# Patient Record
Sex: FEMALE | Race: BLACK OR AFRICAN AMERICAN | NOT HISPANIC OR LATINO | URBAN - METROPOLITAN AREA
[De-identification: names, ages, dates, MRNs, and addresses within clinical notes are randomized per-mention and may not be internally consistent; named-entity substitution may affect disease eponyms.]

---

## 2017-06-12 ENCOUNTER — OUTPATIENT (OUTPATIENT)
Dept: OUTPATIENT SERVICES | Facility: HOSPITAL | Age: 67
LOS: 1 days | Discharge: HOME | End: 2017-06-12

## 2017-06-12 DIAGNOSIS — J40 BRONCHITIS, NOT SPECIFIED AS ACUTE OR CHRONIC: ICD-10-CM

## 2017-06-12 DIAGNOSIS — R07.9 CHEST PAIN, UNSPECIFIED: ICD-10-CM

## 2017-06-12 DIAGNOSIS — E78.5 HYPERLIPIDEMIA, UNSPECIFIED: ICD-10-CM

## 2017-06-28 DIAGNOSIS — E78.5 HYPERLIPIDEMIA, UNSPECIFIED: ICD-10-CM

## 2017-06-28 DIAGNOSIS — E11.9 TYPE 2 DIABETES MELLITUS WITHOUT COMPLICATIONS: ICD-10-CM

## 2017-11-09 ENCOUNTER — OUTPATIENT (OUTPATIENT)
Dept: OUTPATIENT SERVICES | Facility: HOSPITAL | Age: 67
LOS: 1 days | Discharge: HOME | End: 2017-11-09

## 2017-11-09 DIAGNOSIS — E11.9 TYPE 2 DIABETES MELLITUS WITHOUT COMPLICATIONS: ICD-10-CM

## 2017-11-09 DIAGNOSIS — R07.9 CHEST PAIN, UNSPECIFIED: ICD-10-CM

## 2017-11-09 DIAGNOSIS — E78.00 PURE HYPERCHOLESTEROLEMIA, UNSPECIFIED: ICD-10-CM

## 2017-11-09 DIAGNOSIS — J40 BRONCHITIS, NOT SPECIFIED AS ACUTE OR CHRONIC: ICD-10-CM

## 2017-11-09 DIAGNOSIS — E78.5 HYPERLIPIDEMIA, UNSPECIFIED: ICD-10-CM

## 2018-01-23 ENCOUNTER — EMERGENCY (EMERGENCY)
Facility: HOSPITAL | Age: 68
LOS: 0 days | Discharge: HOME | End: 2018-01-24
Admitting: INTERNAL MEDICINE

## 2018-01-23 DIAGNOSIS — E78.5 HYPERLIPIDEMIA, UNSPECIFIED: ICD-10-CM

## 2018-01-23 DIAGNOSIS — R50.9 FEVER, UNSPECIFIED: ICD-10-CM

## 2018-01-23 DIAGNOSIS — R00.0 TACHYCARDIA, UNSPECIFIED: ICD-10-CM

## 2018-01-23 DIAGNOSIS — R07.9 CHEST PAIN, UNSPECIFIED: ICD-10-CM

## 2018-01-23 DIAGNOSIS — R06.02 SHORTNESS OF BREATH: ICD-10-CM

## 2018-01-23 DIAGNOSIS — J40 BRONCHITIS, NOT SPECIFIED AS ACUTE OR CHRONIC: ICD-10-CM

## 2018-03-13 ENCOUNTER — OUTPATIENT (OUTPATIENT)
Dept: OUTPATIENT SERVICES | Facility: HOSPITAL | Age: 68
LOS: 1 days | Discharge: HOME | End: 2018-03-13

## 2018-03-13 DIAGNOSIS — N39.0 URINARY TRACT INFECTION, SITE NOT SPECIFIED: ICD-10-CM

## 2018-03-13 DIAGNOSIS — E11.9 TYPE 2 DIABETES MELLITUS WITHOUT COMPLICATIONS: ICD-10-CM

## 2018-03-13 DIAGNOSIS — E78.00 PURE HYPERCHOLESTEROLEMIA, UNSPECIFIED: ICD-10-CM

## 2018-09-26 ENCOUNTER — OUTPATIENT (OUTPATIENT)
Dept: OUTPATIENT SERVICES | Facility: HOSPITAL | Age: 68
LOS: 1 days | Discharge: HOME | End: 2018-09-26

## 2018-09-26 DIAGNOSIS — E78.00 PURE HYPERCHOLESTEROLEMIA, UNSPECIFIED: ICD-10-CM

## 2018-09-26 DIAGNOSIS — E11.9 TYPE 2 DIABETES MELLITUS WITHOUT COMPLICATIONS: ICD-10-CM

## 2019-11-15 ENCOUNTER — OUTPATIENT (OUTPATIENT)
Dept: OUTPATIENT SERVICES | Facility: HOSPITAL | Age: 69
LOS: 1 days | Discharge: HOME | End: 2019-11-15

## 2019-11-15 DIAGNOSIS — E66.8 OTHER OBESITY: ICD-10-CM

## 2019-11-15 DIAGNOSIS — E03.9 HYPOTHYROIDISM, UNSPECIFIED: ICD-10-CM

## 2019-11-15 DIAGNOSIS — I10 ESSENTIAL (PRIMARY) HYPERTENSION: ICD-10-CM

## 2019-11-15 DIAGNOSIS — E11.65 TYPE 2 DIABETES MELLITUS WITH HYPERGLYCEMIA: ICD-10-CM

## 2019-11-15 DIAGNOSIS — E55.9 VITAMIN D DEFICIENCY, UNSPECIFIED: ICD-10-CM

## 2019-11-15 DIAGNOSIS — E78.00 PURE HYPERCHOLESTEROLEMIA, UNSPECIFIED: ICD-10-CM

## 2019-11-15 DIAGNOSIS — Z00.00 ENCOUNTER FOR GENERAL ADULT MEDICAL EXAMINATION WITHOUT ABNORMAL FINDINGS: ICD-10-CM

## 2020-01-03 ENCOUNTER — APPOINTMENT (OUTPATIENT)
Dept: OBGYN | Facility: CLINIC | Age: 70
End: 2020-01-03

## 2020-08-10 ENCOUNTER — EMERGENCY (EMERGENCY)
Facility: HOSPITAL | Age: 70
LOS: 0 days | Discharge: HOME | End: 2020-08-10
Attending: EMERGENCY MEDICINE | Admitting: EMERGENCY MEDICINE
Payer: MEDICARE

## 2020-08-10 VITALS
DIASTOLIC BLOOD PRESSURE: 74 MMHG | HEIGHT: 63 IN | SYSTOLIC BLOOD PRESSURE: 146 MMHG | HEART RATE: 84 BPM | TEMPERATURE: 101 F | RESPIRATION RATE: 19 BRPM | WEIGHT: 210.1 LBS | OXYGEN SATURATION: 99 %

## 2020-08-10 VITALS — OXYGEN SATURATION: 95 %

## 2020-08-10 DIAGNOSIS — R50.9 FEVER, UNSPECIFIED: ICD-10-CM

## 2020-08-10 DIAGNOSIS — I10 ESSENTIAL (PRIMARY) HYPERTENSION: ICD-10-CM

## 2020-08-10 DIAGNOSIS — U07.1 COVID-19: ICD-10-CM

## 2020-08-10 DIAGNOSIS — J12.89 OTHER VIRAL PNEUMONIA: ICD-10-CM

## 2020-08-10 LAB
ALBUMIN SERPL ELPH-MCNC: 3.7 G/DL — SIGNIFICANT CHANGE UP (ref 3.5–5.2)
ALP SERPL-CCNC: 52 U/L — SIGNIFICANT CHANGE UP (ref 30–115)
ALT FLD-CCNC: 27 U/L — SIGNIFICANT CHANGE UP (ref 0–41)
ANION GAP SERPL CALC-SCNC: 13 MMOL/L — SIGNIFICANT CHANGE UP (ref 7–14)
APPEARANCE UR: CLEAR — SIGNIFICANT CHANGE UP
AST SERPL-CCNC: 49 U/L — HIGH (ref 0–41)
BACTERIA # UR AUTO: ABNORMAL
BASE EXCESS BLDV CALC-SCNC: 13.2 MMOL/L — HIGH (ref -2–2)
BASOPHILS # BLD AUTO: 0.02 K/UL — SIGNIFICANT CHANGE UP (ref 0–0.2)
BASOPHILS NFR BLD AUTO: 0.4 % — SIGNIFICANT CHANGE UP (ref 0–1)
BILIRUB SERPL-MCNC: 0.4 MG/DL — SIGNIFICANT CHANGE UP (ref 0.2–1.2)
BILIRUB UR-MCNC: NEGATIVE — SIGNIFICANT CHANGE UP
BUN SERPL-MCNC: 11 MG/DL — SIGNIFICANT CHANGE UP (ref 10–20)
CA-I SERPL-SCNC: 1.07 MMOL/L — LOW (ref 1.12–1.3)
CALCIUM SERPL-MCNC: 8.9 MG/DL — SIGNIFICANT CHANGE UP (ref 8.5–10.1)
CHLORIDE SERPL-SCNC: 88 MMOL/L — LOW (ref 98–110)
CO2 SERPL-SCNC: 32 MMOL/L — SIGNIFICANT CHANGE UP (ref 17–32)
COLOR SPEC: YELLOW — SIGNIFICANT CHANGE UP
CREAT SERPL-MCNC: 0.9 MG/DL — SIGNIFICANT CHANGE UP (ref 0.7–1.5)
DIFF PNL FLD: NEGATIVE — SIGNIFICANT CHANGE UP
EOSINOPHIL # BLD AUTO: 0 K/UL — SIGNIFICANT CHANGE UP (ref 0–0.7)
EOSINOPHIL NFR BLD AUTO: 0 % — SIGNIFICANT CHANGE UP (ref 0–8)
EPI CELLS # UR: 9 /HPF — HIGH (ref 0–5)
GAS PNL BLDV: 134 MMOL/L — LOW (ref 136–145)
GAS PNL BLDV: SIGNIFICANT CHANGE UP
GLUCOSE SERPL-MCNC: 105 MG/DL — HIGH (ref 70–99)
GLUCOSE UR QL: NEGATIVE — SIGNIFICANT CHANGE UP
HCO3 BLDV-SCNC: 38 MMOL/L — HIGH (ref 22–29)
HCT VFR BLD CALC: 38.4 % — SIGNIFICANT CHANGE UP (ref 37–47)
HCT VFR BLDA CALC: 39.8 % — SIGNIFICANT CHANGE UP (ref 34–44)
HGB BLD CALC-MCNC: 13 G/DL — LOW (ref 14–18)
HGB BLD-MCNC: 12.4 G/DL — SIGNIFICANT CHANGE UP (ref 12–16)
HYALINE CASTS # UR AUTO: 1 /LPF — SIGNIFICANT CHANGE UP (ref 0–7)
IMM GRANULOCYTES NFR BLD AUTO: 0.2 % — SIGNIFICANT CHANGE UP (ref 0.1–0.3)
KETONES UR-MCNC: NEGATIVE — SIGNIFICANT CHANGE UP
LACTATE BLDV-MCNC: 1.3 MMOL/L — SIGNIFICANT CHANGE UP (ref 0.5–1.6)
LEUKOCYTE ESTERASE UR-ACNC: NEGATIVE — SIGNIFICANT CHANGE UP
LYMPHOCYTES # BLD AUTO: 1.23 K/UL — SIGNIFICANT CHANGE UP (ref 1.2–3.4)
LYMPHOCYTES # BLD AUTO: 22.6 % — SIGNIFICANT CHANGE UP (ref 20.5–51.1)
MCHC RBC-ENTMCNC: 27.6 PG — SIGNIFICANT CHANGE UP (ref 27–31)
MCHC RBC-ENTMCNC: 32.3 G/DL — SIGNIFICANT CHANGE UP (ref 32–37)
MCV RBC AUTO: 85.5 FL — SIGNIFICANT CHANGE UP (ref 81–99)
MONOCYTES # BLD AUTO: 0.39 K/UL — SIGNIFICANT CHANGE UP (ref 0.1–0.6)
MONOCYTES NFR BLD AUTO: 7.2 % — SIGNIFICANT CHANGE UP (ref 1.7–9.3)
NEUTROPHILS # BLD AUTO: 3.8 K/UL — SIGNIFICANT CHANGE UP (ref 1.4–6.5)
NEUTROPHILS NFR BLD AUTO: 69.6 % — SIGNIFICANT CHANGE UP (ref 42.2–75.2)
NITRITE UR-MCNC: NEGATIVE — SIGNIFICANT CHANGE UP
NRBC # BLD: 0 /100 WBCS — SIGNIFICANT CHANGE UP (ref 0–0)
PCO2 BLDV: 45 MMHG — SIGNIFICANT CHANGE UP (ref 41–51)
PH BLDV: 7.53 — HIGH (ref 7.26–7.43)
PH UR: 7 — SIGNIFICANT CHANGE UP (ref 5–8)
PLATELET # BLD AUTO: 145 K/UL — SIGNIFICANT CHANGE UP (ref 130–400)
PO2 BLDV: 34 MMHG — SIGNIFICANT CHANGE UP (ref 20–40)
POTASSIUM BLDV-SCNC: 2.7 MMOL/L — LOW (ref 3.3–5.6)
POTASSIUM SERPL-MCNC: 3.4 MMOL/L — LOW (ref 3.5–5)
POTASSIUM SERPL-SCNC: 3.4 MMOL/L — LOW (ref 3.5–5)
PROT SERPL-MCNC: 7.4 G/DL — SIGNIFICANT CHANGE UP (ref 6–8)
PROT UR-MCNC: ABNORMAL
RBC # BLD: 4.49 M/UL — SIGNIFICANT CHANGE UP (ref 4.2–5.4)
RBC # FLD: 14.1 % — SIGNIFICANT CHANGE UP (ref 11.5–14.5)
RBC CASTS # UR COMP ASSIST: 6 /HPF — HIGH (ref 0–4)
SAO2 % BLDV: 67 % — SIGNIFICANT CHANGE UP
SODIUM SERPL-SCNC: 133 MMOL/L — LOW (ref 135–146)
SP GR SPEC: 1.02 — SIGNIFICANT CHANGE UP (ref 1.01–1.02)
UROBILINOGEN FLD QL: SIGNIFICANT CHANGE UP
WBC # BLD: 5.45 K/UL — SIGNIFICANT CHANGE UP (ref 4.8–10.8)
WBC # FLD AUTO: 5.45 K/UL — SIGNIFICANT CHANGE UP (ref 4.8–10.8)
WBC UR QL: 1 /HPF — SIGNIFICANT CHANGE UP (ref 0–5)

## 2020-08-10 PROCEDURE — 93010 ELECTROCARDIOGRAM REPORT: CPT

## 2020-08-10 PROCEDURE — 99284 EMERGENCY DEPT VISIT MOD MDM: CPT | Mod: CS,GC

## 2020-08-10 PROCEDURE — 71045 X-RAY EXAM CHEST 1 VIEW: CPT | Mod: 26

## 2020-08-10 RX ORDER — ACETAMINOPHEN 500 MG
650 TABLET ORAL ONCE
Refills: 0 | Status: COMPLETED | OUTPATIENT
Start: 2020-08-10 | End: 2020-08-10

## 2020-08-10 RX ORDER — SODIUM CHLORIDE 9 MG/ML
1000 INJECTION INTRAMUSCULAR; INTRAVENOUS; SUBCUTANEOUS ONCE
Refills: 0 | Status: COMPLETED | OUTPATIENT
Start: 2020-08-10 | End: 2020-08-10

## 2020-08-10 RX ORDER — IBUPROFEN 200 MG
600 TABLET ORAL ONCE
Refills: 0 | Status: COMPLETED | OUTPATIENT
Start: 2020-08-10 | End: 2020-08-10

## 2020-08-10 RX ORDER — AZITHROMYCIN 500 MG/1
1 TABLET, FILM COATED ORAL
Qty: 4 | Refills: 0
Start: 2020-08-10 | End: 2020-08-13

## 2020-08-10 RX ORDER — ONDANSETRON 8 MG/1
4 TABLET, FILM COATED ORAL ONCE
Refills: 0 | Status: COMPLETED | OUTPATIENT
Start: 2020-08-10 | End: 2020-08-10

## 2020-08-10 RX ORDER — POTASSIUM CHLORIDE 20 MEQ
40 PACKET (EA) ORAL ONCE
Refills: 0 | Status: COMPLETED | OUTPATIENT
Start: 2020-08-10 | End: 2020-08-10

## 2020-08-10 RX ORDER — AZITHROMYCIN 500 MG/1
500 TABLET, FILM COATED ORAL ONCE
Refills: 0 | Status: COMPLETED | OUTPATIENT
Start: 2020-08-10 | End: 2020-08-10

## 2020-08-10 RX ADMIN — Medication 600 MILLIGRAM(S): at 18:24

## 2020-08-10 RX ADMIN — SODIUM CHLORIDE 1000 MILLILITER(S): 9 INJECTION INTRAMUSCULAR; INTRAVENOUS; SUBCUTANEOUS at 13:30

## 2020-08-10 RX ADMIN — ONDANSETRON 4 MILLIGRAM(S): 8 TABLET, FILM COATED ORAL at 15:32

## 2020-08-10 RX ADMIN — Medication 40 MILLIEQUIVALENT(S): at 17:23

## 2020-08-10 RX ADMIN — Medication 650 MILLIGRAM(S): at 13:27

## 2020-08-10 RX ADMIN — AZITHROMYCIN 255 MILLIGRAM(S): 500 TABLET, FILM COATED ORAL at 20:47

## 2020-08-10 RX ADMIN — Medication 650 MILLIGRAM(S): at 18:23

## 2020-08-10 NOTE — ED PROVIDER NOTE - OBJECTIVE STATEMENT
The pt is a 69 year old female with a history of HTN presenting for fever x ~1 week. Associated with cough, body aches, weakness. States she has been taking tylenol (last took ~8am) with mild improvement of symptoms. no nausea, vomiting, chest pain, shortness of breath, abdominal pain.

## 2020-08-10 NOTE — ED PROVIDER NOTE - CARE PROVIDER_API CALL
Max Swanson  INTERNAL MEDICINE  [address] [place] [address]  Phone: [phone]  Fax: [phone]  Established Patient  Follow Up Time: 1-3 Days

## 2020-08-10 NOTE — ED ADULT TRIAGE NOTE - CHIEF COMPLAINT QUOTE
C.o cough and fever for the past few days. Pending covid results from 8/1/20. Last reported fever 101.5 @ 8a took 650mg tylenol po @ home.

## 2020-08-10 NOTE — ED PROVIDER NOTE - CARE PROVIDERS DIRECT ADDRESSES
[email or web address]

## 2020-08-10 NOTE — ED PROVIDER NOTE - NSFOLLOWUPINSTRUCTIONS_ED_ALL_ED_FT
Community-Acquired Pneumonia, Adult  Pneumonia is an infection of the lungs. There are different types of pneumonia. One type can develop while a person is in a hospital. A different type, called community-acquired pneumonia, develops in people who are not, or have not recently been, in the hospital or other health care facility.    What are the causes?  ImagePneumonia may be caused by bacteria, viruses, or funguses. Community-acquired pneumonia is often caused by Streptococcus pneumonia bacteria. These bacteria are often passed from one person to another by breathing in droplets from the cough or sneeze of an infected person.    What increases the risk?  The condition is more likely to develop in:    People who have chronic diseases, such as chronic obstructive pulmonary disease (COPD), asthma, congestive heart failure, cystic fibrosis, diabetes, or kidney disease.  People who have early-stage or late-stage HIV.  People who have sickle cell disease.  People who have had their spleen removed (splenectomy).  People who have poor dental hygiene.  People who have medical conditions that increase the risk of breathing in (aspirating) secretions their own mouth and nose.  People who have a weakened immune system (immunocompromised).  People who smoke.  People who travel to areas where pneumonia-causing germs commonly exist.  People who are around animal habitats or animals that have pneumonia-causing germs, including birds, bats, rabbits, cats, and farm animals.    What are the signs or symptoms?  Symptoms of this condition include:    A dry cough.  A wet (productive) cough.  Fever.  Sweating.  Chest pain, especially when breathing deeply or coughing.  Rapid breathing or difficulty breathing.  Shortness of breath.  Shaking chills.  Fatigue.  Muscle aches.    How is this diagnosed?  Your health care provider will take a medical history and perform a physical exam. You may also have other tests, including:    Imaging studies of your chest, including X-rays.  Tests to check your blood oxygen level and other blood gases.  Other tests on blood, mucus (sputum), fluid around your lungs (pleural fluid), and urine.    If your pneumonia is severe, other tests may be done to identify the specific cause of your illness.    How is this treated?  The type of treatment that you receive depends on many factors, such as the cause of your pneumonia, the medicines you take, and other medical conditions that you have. For most adults, treatment and recovery from pneumonia may occur at home. In some cases, treatment must happen in a hospital. Treatment may include:    Antibiotic medicines, if the pneumonia was caused by bacteria.  Antiviral medicines, if the pneumonia was caused by a virus.  Medicines that are given by mouth or through an IV tube.  Oxygen.  Respiratory therapy.    Although rare, treating severe pneumonia may include:    Mechanical ventilation. This is done if you are not breathing well on your own and you cannot maintain a safe blood oxygen level.  Thoracentesis. This procedure removes fluid around one lung or both lungs to help you breathe better.    Follow these instructions at home:  Take over-the-counter and prescription medicines only as told by your health care provider.    Only take cough medicine if you are losing sleep. Understand that cough medicine can prevent your body’s natural ability to remove mucus from your lungs.  If you were prescribed an antibiotic medicine, take it as told by your health care provider. Do not stop taking the antibiotic even if you start to feel better.    Sleep in a semi-upright position at night. Try sleeping in a reclining chair, or place a few pillows under your head.  Do not use tobacco products, including cigarettes, chewing tobacco, and e-cigarettes. If you need help quitting, ask your health care provider.  Drink enough water to keep your urine clear or pale yellow. This will help to thin out mucus secretions in your lungs.  How is this prevented?  There are ways that you can decrease your risk of developing community-acquired pneumonia. Consider getting a pneumococcal vaccine if:    You are older than 65 years of age.  You are older than 19 years of age and are undergoing cancer treatment, have chronic lung disease, or have other medical conditions that affect your immune system. Ask your health care provider if this applies to you.    There are different types and schedules of pneumococcal vaccines. Ask your health care provider which vaccination option is best for you.    You may also prevent community-acquired pneumonia if you take these actions:    Get an influenza vaccine every year. Ask your health care provider which type of influenza vaccine is best for you.  Go to the dentist on a regular basis.  Wash your hands often. Use hand  if soap and water are not available.    Contact a health care provider if:  You have a fever.  You are losing sleep because you cannot control your cough with cough medicine.  Get help right away if:  You have worsening shortness of breath.  You have increased chest pain.  Your sickness becomes worse, especially if you are an older adult or have a weakened immune system.  You cough up blood.  This information is not intended to replace advice given to you by your health care provider. Make sure you discuss any questions you have with your health care provider.    You are being discharged with viral illness diagnosis and do not require hospitalization.  At this time, only patients who are being hospitalized are tested for COVID-19.    If you are well enough to be discharged home and are not in a high risk group to be admitted, you should care for yourself at home exactly like you would if you have Influenza “flu”. Follow all the standard guidelines about washing your hands, covering your cough, etc. If you feel unwell, stay home, rest and drink plenty of clear fluids. Keep track of your symptoms.   You do need to remain home for at least 7 days from the onset of symptoms or 3 days after your fever is completely gone and your respiratory symptoms are better, whichever is longer. You should continue to isolate yourself.    If symptoms worsen or continue and you need to seek medical care, call your healthcare provider in advance, or 9-1-1 in an emergency, and let them know you are a close contact to a person with confirmed COVID-19  You should return to the Emergency Department if you develop worse symptoms, trouble breathing, chest pain, and/or a fever that doesn’t improve with over the counter medications.    Please consider going through the drive-through testing unless you are severely ill and need to go to the ED.  -through testing is available at various location, including [place].  Call [facility] at  [phone] to make an appointment.    How to Set Up Your Home for Self-Quarantine or Self-Isolation    Please refer to this helpful video.   [email or web address]

## 2020-08-10 NOTE — ED PROVIDER NOTE - CLINICAL SUMMARY MEDICAL DECISION MAKING FREE TEXT BOX
70 yo F presented to ED for fever and body aches. Pt cxr concerning for COVID. Covid sent. COVID test pending. Will tx for pneumonia until results. Discussed with pt importance of quarantining. In addition discussed strict return precautions if her SOB worsens.

## 2020-08-10 NOTE — ED PROVIDER NOTE - NS ED ROS FT
Eyes:  No visual changes, eye pain or discharge.  ENMT:  No hearing changes, pain, discharge or infections. No neck pain or stiffness.  Cardiac:  No chest pain, SOB or edema. No chest pain with exertion.  Respiratory:  + cough or respiratory distress. No hemoptysis. No history of asthma or RAD.  GI:  No nausea, vomiting, diarrhea or abdominal pain.  :  No dysuria, frequency or burning.  MS:  +body aches, no back pain.  Neuro:  No headache.  No LOC.  Skin:  No skin rash.   Endocrine: No history of thyroid disease or diabetes.

## 2020-08-10 NOTE — ED PROVIDER NOTE - ATTENDING CONTRIBUTION TO CARE
70 yo F with PMH of HTN presents to ED for 1 week of fever. Associated with weakness, body aches and mild cough. She has been taking tylenol for it and last took it at 8am this morning. No nausea, vomiting, SOB, palpitations.     Const: Well nourished, well developed, appears stated age  Eyes: PERRL, no conjunctival injection  HENT:  Neck supple without meningismus   CV: RRR, Warm, well-perfused extremities  RESP: CTA B/L, no tachypnea   GI: soft, non-tender, non-distended  MSK: No gross deformities appreciated  Skin: Warm, dry. No rashes  Neuro: Alert, CNs II-XII grossly intact. Sensation and motor function of extremities grossly intact.  Psych: Appropriate mood and affect.    Will do labs, CXR, UA

## 2020-08-11 LAB — SARS-COV-2 RNA SPEC QL NAA+PROBE: DETECTED

## 2020-08-12 NOTE — ED POST DISCHARGE NOTE - RESULT SUMMARY
Spoke with patient regarding (+) COVID-19 result. She stated that she was already aware via e-mail communication. Quarantine precautions advised and strict return precautions discussed.

## 2020-08-15 ENCOUNTER — INPATIENT (INPATIENT)
Facility: HOSPITAL | Age: 70
LOS: 3 days | Discharge: HOME | End: 2020-08-19
Attending: INTERNAL MEDICINE | Admitting: INTERNAL MEDICINE
Payer: MEDICARE

## 2020-08-15 VITALS
HEART RATE: 83 BPM | TEMPERATURE: 100 F | HEIGHT: 63 IN | RESPIRATION RATE: 24 BRPM | OXYGEN SATURATION: 92 % | WEIGHT: 210.1 LBS | SYSTOLIC BLOOD PRESSURE: 144 MMHG | DIASTOLIC BLOOD PRESSURE: 65 MMHG

## 2020-08-15 DIAGNOSIS — R09.89 OTHER SPECIFIED SYMPTOMS AND SIGNS INVOLVING THE CIRCULATORY AND RESPIRATORY SYSTEMS: ICD-10-CM

## 2020-08-15 DIAGNOSIS — Z96.652 PRESENCE OF LEFT ARTIFICIAL KNEE JOINT: Chronic | ICD-10-CM

## 2020-08-15 DIAGNOSIS — Z90.89 ACQUIRED ABSENCE OF OTHER ORGANS: Chronic | ICD-10-CM

## 2020-08-15 LAB
ALBUMIN SERPL ELPH-MCNC: 3.5 G/DL — SIGNIFICANT CHANGE UP (ref 3.5–5.2)
ALP SERPL-CCNC: 47 U/L — SIGNIFICANT CHANGE UP (ref 30–115)
ALT FLD-CCNC: 23 U/L — SIGNIFICANT CHANGE UP (ref 0–41)
ANION GAP SERPL CALC-SCNC: 12 MMOL/L — SIGNIFICANT CHANGE UP (ref 7–14)
APTT BLD: 26.7 SEC — LOW (ref 27–39.2)
AST SERPL-CCNC: 38 U/L — SIGNIFICANT CHANGE UP (ref 0–41)
BASOPHILS # BLD AUTO: 0.03 K/UL — SIGNIFICANT CHANGE UP (ref 0–0.2)
BASOPHILS NFR BLD AUTO: 0.4 % — SIGNIFICANT CHANGE UP (ref 0–1)
BILIRUB SERPL-MCNC: 0.5 MG/DL — SIGNIFICANT CHANGE UP (ref 0.2–1.2)
BUN SERPL-MCNC: 8 MG/DL — LOW (ref 10–20)
CALCIUM SERPL-MCNC: 9.4 MG/DL — SIGNIFICANT CHANGE UP (ref 8.5–10.1)
CHLORIDE SERPL-SCNC: 95 MMOL/L — LOW (ref 98–110)
CO2 SERPL-SCNC: 31 MMOL/L — SIGNIFICANT CHANGE UP (ref 17–32)
CREAT SERPL-MCNC: 0.7 MG/DL — SIGNIFICANT CHANGE UP (ref 0.7–1.5)
CRP SERPL-MCNC: 8.78 MG/DL — HIGH (ref 0–0.4)
D DIMER BLD IA.RAPID-MCNC: 3033 NG/ML DDU — HIGH (ref 0–230)
EOSINOPHIL # BLD AUTO: 0.11 K/UL — SIGNIFICANT CHANGE UP (ref 0–0.7)
EOSINOPHIL NFR BLD AUTO: 1.5 % — SIGNIFICANT CHANGE UP (ref 0–8)
FERRITIN SERPL-MCNC: 289 NG/ML — HIGH (ref 15–150)
GLUCOSE SERPL-MCNC: 123 MG/DL — HIGH (ref 70–99)
HCT VFR BLD CALC: 36.4 % — LOW (ref 37–47)
HGB BLD-MCNC: 11.7 G/DL — LOW (ref 12–16)
IMM GRANULOCYTES NFR BLD AUTO: 0.7 % — HIGH (ref 0.1–0.3)
INR BLD: 1.01 RATIO — SIGNIFICANT CHANGE UP (ref 0.65–1.3)
LDH SERPL L TO P-CCNC: 375 — HIGH (ref 50–242)
LYMPHOCYTES # BLD AUTO: 1.58 K/UL — SIGNIFICANT CHANGE UP (ref 1.2–3.4)
LYMPHOCYTES # BLD AUTO: 21.4 % — SIGNIFICANT CHANGE UP (ref 20.5–51.1)
MCHC RBC-ENTMCNC: 27.7 PG — SIGNIFICANT CHANGE UP (ref 27–31)
MCHC RBC-ENTMCNC: 32.1 G/DL — SIGNIFICANT CHANGE UP (ref 32–37)
MCV RBC AUTO: 86.1 FL — SIGNIFICANT CHANGE UP (ref 81–99)
MONOCYTES # BLD AUTO: 0.66 K/UL — HIGH (ref 0.1–0.6)
MONOCYTES NFR BLD AUTO: 8.9 % — SIGNIFICANT CHANGE UP (ref 1.7–9.3)
NEUTROPHILS # BLD AUTO: 4.95 K/UL — SIGNIFICANT CHANGE UP (ref 1.4–6.5)
NEUTROPHILS NFR BLD AUTO: 67.1 % — SIGNIFICANT CHANGE UP (ref 42.2–75.2)
NRBC # BLD: 0 /100 WBCS — SIGNIFICANT CHANGE UP (ref 0–0)
PLATELET # BLD AUTO: 319 K/UL — SIGNIFICANT CHANGE UP (ref 130–400)
POTASSIUM SERPL-MCNC: 3.6 MMOL/L — SIGNIFICANT CHANGE UP (ref 3.5–5)
POTASSIUM SERPL-SCNC: 3.6 MMOL/L — SIGNIFICANT CHANGE UP (ref 3.5–5)
PROCALCITONIN SERPL-MCNC: 0.05 NG/ML — SIGNIFICANT CHANGE UP (ref 0.02–0.1)
PROCALCITONIN SERPL-MCNC: 0.06 NG/ML — SIGNIFICANT CHANGE UP (ref 0.02–0.1)
PROT SERPL-MCNC: 7.3 G/DL — SIGNIFICANT CHANGE UP (ref 6–8)
PROTHROM AB SERPL-ACNC: 11.6 SEC — SIGNIFICANT CHANGE UP (ref 9.95–12.87)
RBC # BLD: 4.23 M/UL — SIGNIFICANT CHANGE UP (ref 4.2–5.4)
RBC # FLD: 14.3 % — SIGNIFICANT CHANGE UP (ref 11.5–14.5)
SODIUM SERPL-SCNC: 138 MMOL/L — SIGNIFICANT CHANGE UP (ref 135–146)
TRIGL SERPL-MCNC: 128 MG/DL — SIGNIFICANT CHANGE UP (ref 10–149)
TROPONIN T SERPL-MCNC: <0.01 NG/ML — SIGNIFICANT CHANGE UP
WBC # BLD: 7.38 K/UL — SIGNIFICANT CHANGE UP (ref 4.8–10.8)
WBC # FLD AUTO: 7.38 K/UL — SIGNIFICANT CHANGE UP (ref 4.8–10.8)

## 2020-08-15 PROCEDURE — 71045 X-RAY EXAM CHEST 1 VIEW: CPT | Mod: 26

## 2020-08-15 PROCEDURE — 99285 EMERGENCY DEPT VISIT HI MDM: CPT | Mod: CS,GC

## 2020-08-15 PROCEDURE — 71275 CT ANGIOGRAPHY CHEST: CPT | Mod: 26,CS

## 2020-08-15 PROCEDURE — 93970 EXTREMITY STUDY: CPT | Mod: 26,CS

## 2020-08-15 PROCEDURE — 93010 ELECTROCARDIOGRAM REPORT: CPT

## 2020-08-15 RX ORDER — REMDESIVIR 5 MG/ML
100 INJECTION INTRAVENOUS EVERY 24 HOURS
Refills: 0 | Status: DISCONTINUED | OUTPATIENT
Start: 2020-08-16 | End: 2020-08-19

## 2020-08-15 RX ORDER — REMDESIVIR 5 MG/ML
INJECTION INTRAVENOUS
Refills: 0 | Status: DISCONTINUED | OUTPATIENT
Start: 2020-08-15 | End: 2020-08-19

## 2020-08-15 RX ORDER — ASPIRIN/CALCIUM CARB/MAGNESIUM 324 MG
81 TABLET ORAL DAILY
Refills: 0 | Status: DISCONTINUED | OUTPATIENT
Start: 2020-08-15 | End: 2020-08-19

## 2020-08-15 RX ORDER — HYDROCHLOROTHIAZIDE 25 MG
25 TABLET ORAL DAILY
Refills: 0 | Status: DISCONTINUED | OUTPATIENT
Start: 2020-08-15 | End: 2020-08-19

## 2020-08-15 RX ORDER — AMLODIPINE BESYLATE 2.5 MG/1
5 TABLET ORAL DAILY
Refills: 0 | Status: DISCONTINUED | OUTPATIENT
Start: 2020-08-15 | End: 2020-08-19

## 2020-08-15 RX ORDER — ATORVASTATIN CALCIUM 80 MG/1
80 TABLET, FILM COATED ORAL AT BEDTIME
Refills: 0 | Status: DISCONTINUED | OUTPATIENT
Start: 2020-08-15 | End: 2020-08-19

## 2020-08-15 RX ORDER — OMEGA-3 ACID ETHYL ESTERS 1 G
2 CAPSULE ORAL DAILY
Refills: 0 | Status: DISCONTINUED | OUTPATIENT
Start: 2020-08-15 | End: 2020-08-19

## 2020-08-15 RX ORDER — REMDESIVIR 5 MG/ML
200 INJECTION INTRAVENOUS EVERY 24 HOURS
Refills: 0 | Status: COMPLETED | OUTPATIENT
Start: 2020-08-15 | End: 2020-08-15

## 2020-08-15 RX ORDER — PANTOPRAZOLE SODIUM 20 MG/1
40 TABLET, DELAYED RELEASE ORAL
Refills: 0 | Status: DISCONTINUED | OUTPATIENT
Start: 2020-08-15 | End: 2020-08-19

## 2020-08-15 RX ORDER — ENOXAPARIN SODIUM 100 MG/ML
90 INJECTION SUBCUTANEOUS EVERY 12 HOURS
Refills: 0 | Status: DISCONTINUED | OUTPATIENT
Start: 2020-08-15 | End: 2020-08-17

## 2020-08-15 RX ORDER — REMDESIVIR 5 MG/ML
INJECTION INTRAVENOUS
Refills: 0 | Status: DISCONTINUED | OUTPATIENT
Start: 2020-08-15 | End: 2020-08-15

## 2020-08-15 RX ORDER — CHLORHEXIDINE GLUCONATE 213 G/1000ML
1 SOLUTION TOPICAL
Refills: 0 | Status: DISCONTINUED | OUTPATIENT
Start: 2020-08-15 | End: 2020-08-19

## 2020-08-15 RX ORDER — DEXAMETHASONE 0.5 MG/5ML
10 ELIXIR ORAL ONCE
Refills: 0 | Status: COMPLETED | OUTPATIENT
Start: 2020-08-15 | End: 2020-08-15

## 2020-08-15 RX ADMIN — Medication 102 MILLIGRAM(S): at 04:00

## 2020-08-15 RX ADMIN — ATORVASTATIN CALCIUM 80 MILLIGRAM(S): 80 TABLET, FILM COATED ORAL at 21:38

## 2020-08-15 RX ADMIN — ENOXAPARIN SODIUM 90 MILLIGRAM(S): 100 INJECTION SUBCUTANEOUS at 17:17

## 2020-08-15 RX ADMIN — Medication 2 GRAM(S): at 11:36

## 2020-08-15 RX ADMIN — REMDESIVIR 500 MILLIGRAM(S): 5 INJECTION INTRAVENOUS at 18:20

## 2020-08-15 RX ADMIN — Medication 60 MILLIGRAM(S): at 18:12

## 2020-08-15 RX ADMIN — Medication 81 MILLIGRAM(S): at 11:36

## 2020-08-15 NOTE — CONSULT NOTE ADULT - SUBJECTIVE AND OBJECTIVE BOX
GRAHAM STUBBS  69y, Female  Allergy: No Known Allergies      CHIEF COMPLAINT: COVID-19 pneumonia (15 Aug 2020 10:11)      LOS      HPI:  [69y woman]    CC: worsening shortness of breath    PMH: hypertension, dyslipidemia, osteoarthritis s/p L knee replacement, and recent COVID-19 infection    PSH: L total knee replacement 2020, tonsillectomy    History of present illness goes back to five days ago when the patient presented to the [facility] for fever of 1 weeks duration associated with body aches, cough, and weakness. Patient was diagnosed with CAP and suspicion for COVID-19, and was discharged on azithromycin. COVID-19 test came back positive after the patient was discharged, but she felt fine at the time. At approximately 8pm on 2020, the patient developed a cough that progressively worsened over the course of a couple hours. During that time, her breathing became shallow and more labored. At approximately 1am, the patient had her son drive her to the ED. Patient also claims that she gets pneumonia or bronchitis every year.    In the ED, vital signs were Tmax 99.5F, HR 83, /65, RR 24, SpO2 95% on 3L O2 via nasal cannula. Patient received dexamethasone 10mg IV. Chest X-ray showed worsening bilateral opacities. D-dimer was 3033 on admission. CT angio chest was negative for pulmonary embolism, but showed significant bilateral patchy ground-glass opacities. (15 Aug 2020 10:11)      INFECTIOUS DISEASE HISTORY:  She is currently on 2L NC.     PAST MEDICAL & SURGICAL HISTORY:  Osteoarthritis  Dyslipidemia  Hypertension  History of tonsillectomy  History of left knee replacement: 2020      FAMILY HISTORY      SOCIAL HISTORY  Social History:  Marital Status: single  Living Situation: lives at home with daughter  Occupation: retired, former work for mental health services (38yrs)  Tobacco Use: former smoker, quit ~20yrs ago, smoked 1ppd for ~30yrs  Alcohol Use: socially  Drug Use: denies  Sexual History: declined to answer  Functional Status: ambulates at home with cane (15 Aug 2020 10:11)        ROS  General: Denies rigors, nightsweats  HEENT: Denies headache, rhinorrhea, sore throat, eye pain  CV: Denies CP, palpitations  PULM: Denies wheezing, hemoptysis  GI: Denies hematemesis, hematochezia, melena  : Denies discharge, hematuria  MSK: Denies arthralgias, myalgias  SKIN: Denies rash, lesions  NEURO: Denies paresthesias, weakness  PSYCH: Denies depression, anxiety    VITALS:  T(F): 98.5, Max: 99.5 (08-15-20 @ 02:15)  HR: 71  BP: 126/75  RR: 18Vital Signs Last 24 Hrs  T(C): 36.9 (15 Aug 2020 11:15), Max: 37.5 (15 Aug 2020 02:15)  T(F): 98.5 (15 Aug 2020 11:15), Max: 99.5 (15 Aug 2020 02:15)  HR: 71 (15 Aug 2020 11:15) (71 - 83)  BP: 126/75 (15 Aug 2020 11:15) (126/75 - 144/65)  BP(mean): --  RR: 18 (15 Aug 2020 11:15) (18 - 24)  SpO2: 98% (15 Aug 2020 11:15) (92% - 98%)    PHYSICAL EXAM:  Gen: NAD, resting in bed  HEENT: Normocephalic, atraumatic  Neck: supple, no lymphadenopathy  CV: Regular rate & regular rhythm  Lungs: decreased BS at bases, no fremitus  Abdomen: Soft, BS present  Ext: Warm, well perfused  Neuro: non focal, awake  Skin: no rash, no erythema  Lines: no phlebitis    TESTS & MEASUREMENTS:                        11.7   7.38  )-----------( 319      ( 15 Aug 2020 03:36 )             36.4     08-15    138  |  95<L>  |  8<L>  ----------------------------<  123<H>  3.6   |  31  |  0.7    Ca    9.4      15 Aug 2020 03:36    TPro  7.3  /  Alb  3.5  /  TBili  0.5  /  DBili  x   /  AST  38  /  ALT  23  /  AlkPhos  47  15    eGFR if Non African American: 89 mL/min/1.73M2 (08-15-20 @ 03:36)  eGFR if : 103 mL/min/1.73M2 (08-15-20 @ 03:36)    LIVER FUNCTIONS - ( 15 Aug 2020 03:36 )  Alb: 3.5 g/dL / Pro: 7.3 g/dL / ALK PHOS: 47 U/L / ALT: 23 U/L / AST: 38 U/L / GGT: x                 Blood Gas Venous - Lactate: 1.3 mmoL/L (08-10-20 @ 15:56)      INFECTIOUS DISEASES TESTING  Procalcitonin, Serum: 0.06 ng/mL (08-15-20 @ 03:37)  Procalcitonin, Serum: 0.05 ng/mL (08-15-20 @ 03:36)  COVID-19 PCR: Detected (08-10-20 @ 19:58)      RADIOLOGY & ADDITIONAL TESTS:  I have personally reviewed the last Chest xray  CXR  Xray Chest 1 View- PORTABLE-Urgent:   EXAM:  XR CHEST PORTABLE URGENT 1V            PROCEDURE DATE:  08/15/2020            INTERPRETATION:  Clinical History / Reason for exam: Shortness of breath    Comparison : Chest radiograph August 10, 2020.    Technique/Positionin frontal views.    Findings:    Support devices: None.    Cardiac/mediastinum/hilum: Heart size within normal limits, thoracic aortic calcification.    Lung parenchyma/Pleura: Bilateral opacities.    Skeleton/soft tissues: Stable.    Impression:    Bilateral opacities, worsened.                ERLINDA FUENTES M.D., ATTENDING RADIOLOGIST  This document has been electronically signed. Aug 15 2020  7:12AM             (08-15-20 @ 03:08)      CT  CT Angio Chest w/ IV Cont:   EXAM:  CT ANGIO CHEST (W)AW IC            PROCEDURE DATE:  08/15/2020            INTERPRETATION:  CLINICAL HISTORY/REASON FOR EXAM: Chest pain. Recent diagnosis of COVID-19.    TECHNIQUE: Multislice helical sections were obtained from the thoracic inlet to the lung bases during rapid administration of intravenous contrast. Thin sections were reconstructed through the pulmonary vasculature. 3D (MIP) reformats obtained.    COMPARISON: None.    FINDINGS:    PULMONARY EMBOLUS: No evidence of acute pulmonary embolism.    LUNGS, PLEURA, AIRWAYS: Multifocal bilateral patchy groundglass and consolidative airspace opacities. No lobar mass, effusion, or pneumothorax. No evidence of central endobronchial obstruction. No bronchiectasis or honeycombing.    THORACIC NODES: Nonspecific prominent 1.6 cm short axis subcarinal lymph node, likely reactive.    MEDIASTINUM/GREAT VESSELS: No pericardial effusion. Heart size is within normal limits. The aorta and main pulmonary artery are of normal caliber. Small hiatal hernia.    BONES/SOFT TISSUES: Degenerative change, thoracic spine.    VISUALIZED UPPER ABDOMEN: Right renal cyst. Pancreatic body 0.9 cm lipoma.      IMPRESSION:    1. Multifocal bilateral patchy groundglass and consolidative airspace opacities, consistent with viral pneumonia in the appropriate clinical setting.    2. No evidence of acute pulmonary embolism.                    BRISEYDA ALEXIS M.D., ATTENDING RADIOLOGIST  This document has been electronically signed. Aug 15 2020  7:38AM            (08-15-20 @ 07:37)      CARDIOLOGY TESTING  12 Lead ECG:   Ventricular Rate 81 BPM    Atrial Rate 81 BPM    P-R Interval 172 ms    QRS Duration 80 ms    Q-T Interval 394 ms    QTC Calculation(Bezet) 457 ms    P Axis 27 degrees    R Axis -12 degrees    T Axis 3 degrees    Diagnosis Line Normal sinus rhythm with sinus arrhythmia  Low voltage QRS  Cannot rule out Anterior infarct , age undetermined  Abnormal ECG    Confirmed by MANUEL BLAS MD ([id number]) on 8/15/2020 9:01:27 AM (08-15-20 @ 02:53)  12 Lead ECG:   Ventricular Rate 82 BPM    Atrial Rate 82 BPM    P-R Interval 176 ms    QRS Duration 82 ms    Q-T Interval 438 ms    QTC Calculation(Bezet) 511 ms    P Axis 29 degrees    R Axis -2 degrees    T Axis 77 degrees    Diagnosis Line Sinus rhythm with Fusion complexes  Low voltage QRS  Nonspecific T wave abnormality  Abnormal ECG    Confirmed by ARIA DAMICO MD [id number]) on 8/10/2020 3:18:12 PM (08-10-20 @ 13:48)      MEDICATIONS  amLODIPine   Tablet 5 Oral daily  aspirin enteric coated 81 Oral daily  atorvastatin 80 Oral at bedtime  chlorhexidine 4% Liquid 1 Topical <User Schedule>  enoxaparin Injectable 90 SubCutaneous every 12 hours  hydrochlorothiazide 25 Oral daily  methylPREDNISolone sodium succinate Injectable 60 IV Push every 12 hours  omega-3-Acid Ethyl Esters 2 Oral daily  pantoprazole    Tablet 40 Oral before breakfast      Weight  Weight (kg): 95.3 (08-15-20 @ 02:15)    ANTIBIOTICS:      ALLERGIES:  No Known Allergies GRAHAM STUBBS  69y, Female  Allergy: No Known Allergies      CHIEF COMPLAINT: COVID-19 pneumonia (15 Aug 2020 10:11)      LOS      HPI:  [69y woman]    CC: worsening shortness of breath    PMH: hypertension, dyslipidemia, osteoarthritis s/p L knee replacement, and recent COVID-19 infection    PSH: L total knee replacement 2020, tonsillectomy    History of present illness goes back to five days ago when the patient presented to the [facility] for fever of 1 weeks duration associated with body aches, cough, and weakness. Patient was diagnosed with CAP and suspicion for COVID-19, and was discharged on azithromycin. COVID-19 test came back positive after the patient was discharged, but she felt fine at the time. At approximately 8pm on 2020, the patient developed a cough that progressively worsened over the course of a couple hours. During that time, her breathing became shallow and more labored. At approximately 1am, the patient had her son drive her to the ED. Patient also claims that she gets pneumonia or bronchitis every year.    In the ED, vital signs were Tmax 99.5F, HR 83, /65, RR 24, SpO2 95% on 3L O2 via nasal cannula. Patient received dexamethasone 10mg IV. Chest X-ray showed worsening bilateral opacities. D-dimer was 3033 on admission. CT angio chest was negative for pulmonary embolism, but showed significant bilateral patchy ground-glass opacities. (15 Aug 2020 10:11)      INFECTIOUS DISEASE HISTORY:  She is currently on 2L NC. She reports some improvement in her breathing after it worsened last night. Still with dry cough. Denies any abdominal pain, nausea, vomiting, or diarrhea. Denies any chest pain. No headaches. Denies dysuria. No recent travel. She has been started on solumdrol. Lives at home with daughter. No sick contacts.     PAST MEDICAL & SURGICAL HISTORY:  Osteoarthritis  Dyslipidemia  Hypertension  History of tonsillectomy  History of left knee replacement: 2020      FAMILY HISTORY      SOCIAL HISTORY  Social History:  Marital Status: single  Living Situation: lives at home with daughter  Occupation: retired, former work for mental health services (38yrs)  Tobacco Use: former smoker, quit ~20yrs ago, smoked 1ppd for ~30yrs  Alcohol Use: socially  Drug Use: denies  Sexual History: declined to answer  Functional Status: ambulates at home with cane (15 Aug 2020 10:11)        ROS  General: Denies rigors, nightsweats  HEENT: Denies headache, rhinorrhea, sore throat, eye pain  CV: Denies CP, palpitations  PULM: Denies wheezing, hemoptysis  GI: Denies hematemesis, hematochezia, melena  : Denies discharge, hematuria  MSK: Denies arthralgias, myalgias  SKIN: Denies rash, lesions  NEURO: Denies paresthesias, weakness  PSYCH: Denies depression, anxiety    VITALS:  T(F): 98.5, Max: 99.5 (08-15-20 @ 02:15)  HR: 71  BP: 126/75  RR: 18Vital Signs Last 24 Hrs  T(C): 36.9 (15 Aug 2020 11:15), Max: 37.5 (15 Aug 2020 02:15)  T(F): 98.5 (15 Aug 2020 11:15), Max: 99.5 (15 Aug 2020 02:15)  HR: 71 (15 Aug 2020 11:15) (71 - 83)  BP: 126/75 (15 Aug 2020 11:15) (126/75 - 144/65)  BP(mean): --  RR: 18 (15 Aug 2020 11:15) (18 - 24)  SpO2: 98% (15 Aug 2020 11:15) (92% - 98%)    PHYSICAL EXAM:  Gen: NAD, resting in bed  HEENT: Normocephalic, atraumatic  Neck: supple, no lymphadenopathy  CV: Regular rate & regular rhythm  Lungs: decreased BS at bases, no fremitus  Abdomen: Soft, BS present  Ext: Warm, well perfused  Neuro: non focal, awake  Skin: no rash, no erythema  Lines: no phlebitis    TESTS & MEASUREMENTS:                        11.7   7.38  )-----------( 319      ( 15 Aug 2020 03:36 )             36.4     08-15    138  |  95<L>  |  8<L>  ----------------------------<  123<H>  3.6   |  31  |  0.7    Ca    9.4      15 Aug 2020 03:36    TPro  7.3  /  Alb  3.5  /  TBili  0.5  /  DBili  x   /  AST  38  /  ALT  23  /  AlkPhos  47  08-15    eGFR if Non African American: 89 mL/min/1.73M2 (08-15-20 @ 03:36)  eGFR if : 103 mL/min/1.73M2 (08-15-20 @ 03:36)    LIVER FUNCTIONS - ( 15 Aug 2020 03:36 )  Alb: 3.5 g/dL / Pro: 7.3 g/dL / ALK PHOS: 47 U/L / ALT: 23 U/L / AST: 38 U/L / GGT: x                 Blood Gas Venous - Lactate: 1.3 mmoL/L (08-10-20 @ 15:56)      INFECTIOUS DISEASES TESTING  Procalcitonin, Serum: 0.06 ng/mL (08-15-20 @ 03:37)  Procalcitonin, Serum: 0.05 ng/mL (08-15-20 @ 03:36)  COVID-19 PCR: Detected (08-10-20 @ 19:58)      RADIOLOGY & ADDITIONAL TESTS:  I have personally reviewed the last Chest xray  CXR  Xray Chest 1 View- PORTABLE-Urgent:   EXAM:  XR CHEST PORTABLE URGENT 1V            PROCEDURE DATE:  08/15/2020            INTERPRETATION:  Clinical History / Reason for exam: Shortness of breath    Comparison : Chest radiograph August 10, 2020.    Technique/Positionin frontal views.    Findings:    Support devices: None.    Cardiac/mediastinum/hilum: Heart size within normal limits, thoracic aortic calcification.    Lung parenchyma/Pleura: Bilateral opacities.    Skeleton/soft tissues: Stable.    Impression:    Bilateral opacities, worsened.                ERLINDA FUENTES M.D., ATTENDING RADIOLOGIST  This document has been electronically signed. Aug 15 2020  7:12AM             (08-15-20 @ 03:08)      CT  CT Angio Chest w/ IV Cont:   EXAM:  CT ANGIO CHEST (W)AW IC            PROCEDURE DATE:  08/15/2020            INTERPRETATION:  CLINICAL HISTORY/REASON FOR EXAM: Chest pain. Recent diagnosis of COVID-19.    TECHNIQUE: Multislice helical sections were obtained from the thoracic inlet to the lung bases during rapid administration of intravenous contrast. Thin sections were reconstructed through the pulmonary vasculature. 3D (MIP) reformats obtained.    COMPARISON: None.    FINDINGS:    PULMONARY EMBOLUS: No evidence of acute pulmonary embolism.    LUNGS, PLEURA, AIRWAYS: Multifocal bilateral patchy groundglass and consolidative airspace opacities. No lobar mass, effusion, or pneumothorax. No evidence of central endobronchial obstruction. No bronchiectasis or honeycombing.    THORACIC NODES: Nonspecific prominent 1.6 cm short axis subcarinal lymph node, likely reactive.    MEDIASTINUM/GREAT VESSELS: No pericardial effusion. Heart size is within normal limits. The aorta and main pulmonary artery are of normal caliber. Small hiatal hernia.    BONES/SOFT TISSUES: Degenerative change, thoracic spine.    VISUALIZED UPPER ABDOMEN: Right renal cyst. Pancreatic body 0.9 cm lipoma.      IMPRESSION:    1. Multifocal bilateral patchy groundglass and consolidative airspace opacities, consistent with viral pneumonia in the appropriate clinical setting.    2. No evidence of acute pulmonary embolism.                    BRISEYDA ALEXIS M.D., ATTENDING RADIOLOGIST  This document has been electronically signed. Aug 15 2020  7:38AM            (08-15-20 @ 07:37)      CARDIOLOGY TESTING  12 Lead ECG:   Ventricular Rate 81 BPM    Atrial Rate 81 BPM    P-R Interval 172 ms    QRS Duration 80 ms    Q-T Interval 394 ms    QTC Calculation(Bezet) 457 ms    P Axis 27 degrees    R Axis -12 degrees    T Axis 3 degrees    Diagnosis Line Normal sinus rhythm with sinus arrhythmia  Low voltage QRS  Cannot rule out Anterior infarct , age undetermined  Abnormal ECG    Confirmed by MANUEL BLAS MD ([id number]) on 8/15/2020 9:01:27 AM (08-15-20 @ 02:53)  12 Lead ECG:   Ventricular Rate 82 BPM    Atrial Rate 82 BPM    P-R Interval 176 ms    QRS Duration 82 ms    Q-T Interval 438 ms    QTC Calculation(Bezet) 511 ms    P Axis 29 degrees    R Axis -2 degrees    T Axis 77 degrees    Diagnosis Line Sinus rhythm with Fusion complexes  Low voltage QRS  Nonspecific T wave abnormality  Abnormal ECG    Confirmed by ARIA DAMICO MD [id number]) on 8/10/2020 3:18:12 PM (08-10-20 @ 13:48)      MEDICATIONS  amLODIPine   Tablet 5 Oral daily  aspirin enteric coated 81 Oral daily  atorvastatin 80 Oral at bedtime  chlorhexidine 4% Liquid 1 Topical <User Schedule>  enoxaparin Injectable 90 SubCutaneous every 12 hours  hydrochlorothiazide 25 Oral daily  methylPREDNISolone sodium succinate Injectable 60 IV Push every 12 hours  omega-3-Acid Ethyl Esters 2 Oral daily  pantoprazole    Tablet 40 Oral before breakfast      Weight  Weight (kg): 95.3 (08-15-20 @ 02:15)    ANTIBIOTICS:      ALLERGIES:  No Known Allergies

## 2020-08-15 NOTE — ED PROVIDER NOTE - OBJECTIVE STATEMENT
69F h/o COVID + earlier this week, HTN, HLD, obesity p/w dyspnea, fever, cough. Dyspnea at rest -- moderate in severity. Pt states that symptoms all worsening since discharge. Admits to chest discomfort and back pain. Denies abd pain, n/v/d.

## 2020-08-15 NOTE — H&P ADULT - ATTENDING COMMENTS
patient seen and examined independently on morning rounds in the ED, chart reviewed and discussed with medicine resident and agree with the above overnight H/P and assessment and plan with the following addendum:     in brief, 68 yo woman with h/o htn, HL and osteoarthritis who p/w acute sob and worsening cough after recently testing postivie for Covid-19 (positive swab from  [facility] ED on 8/10 when patient presented for fever- she was discharged home at that time on oral azithro and was informed of results back however was feeling ok).  on 8/14, she became very sob with associated worsening cough (non-productive) and had her son drive her to ED. She states she has been quarentining for last almost 2 weeks at home- lives with daughter an she has not been around her for almost 3 weeks.  She is not aware of any known sick contacts.      pcp- Dr. Swanson    ROS- as per above otherwise review of systems unremarkable    PE:  GEN-NAD, AAOx3  HEENT- NCAT, PERRLA, EOMI  PULM- decreased air entry with bilateral basilar crackles  CVS- +s1/s2 RRR no murmurs  GI- soft NT ND +bs, no rebound, no guarding  EXT- no edema    labs/radiology reviewed    CTA- bilateral ground glass opacities c/w viral pneumonia (covid-19), negative for PE  covid pcr 8/10 + and 8/15 +    ddimer 3000, procalcitonin 0.06    a/p:  #Acute hypoxic respiratory failure 2/2 viral (Covid-19) pnemonia/ARDS with cytokine storm  -cont contact/airborne isolation precautions  -ddimer 3000--->cta negative for pe--check Duplex bilateral LE to r/o dvt  -monitor o2 sat----currently on 2L with o2 sat 94-96%  -iv solumedrol 60 mg q12 hr  -ID following  -started on remdesevir (currently on day #2 of 4--dose 100 mg IV daily)  -cont lovenox 90 mg sq q12 hr  -check covid Ab---if negative and clinically deteriorate could consider convalescent plasma or Toci  -f/u ddimer, procal q48 hr  -ABG and pulmonary consult if respiratory status clinically worsens over the next 12-24 hrs    #HTN- stable  -cont norvasc and HCTZ  -monitor bp closely    #DVT/GI ppx  guarded prognosis    #Progress Note Handoff  Pending (specify):  improvement in respiratory and clinical symptoms- also remains on remdesivir (day #2) as well as iv steroids  Disposition: dc Home when stable

## 2020-08-15 NOTE — ED PROVIDER NOTE - PHYSICAL EXAMINATION
Constitutional: Well developed, well nourished. NAD. Good general hygiene  Head: Atraumatic.  Eyes: PERRLA. EOMI without discomfort.   ENT: No nasal discharge. Mucous membranes moist.  Neck: Supple. Painless ROM.  Cardiovascular: Regular rhythm. Regular rate. Normal S1 and S2. No murmurs. 2+ pulses in all extremities.   Pulmonary: RR 24. B/l rhonchi.   Abdominal: Soft. Nondistended. Nontender. No rebound or guarding.   Extremities. Pelvis stable. No lower extremity edema. Symmetric calves.  Skin: No rashes.   Neuro: AAOx3. No focal neurological deficits.

## 2020-08-15 NOTE — H&P ADULT - NSHPPHYSICALEXAM_GEN_ALL_CORE
CONSTITUTIONAL: No acute distress, obese, well-groomed, AAOx3  HEAD: Atraumatic, normocephalic  EYES: EOM intact, PERRLA, conjunctiva and sclera clear  ENT: Supple, no masses, no thyromegaly, no bruits, no JVD; moist mucous membranes  PULMONARY: Diffuse wheezing and rhonchi in all lung fields, most prominently at the bases  CARDIOVASCULAR: Regular rate and rhythm; no murmurs, rubs, or gallops  GASTROINTESTINAL: Soft, non-tender, non-distended; bowel sounds present  MUSCULOSKELETAL: 2+ peripheral pulses; no clubbing, no cyanosis, no edema  NEUROLOGY: non-focal  SKIN: No rashes or lesions; warm and dry

## 2020-08-15 NOTE — H&P ADULT - NSHPSOCIALHISTORY_GEN_ALL_CORE
Marital Status: single  Living Situation: lives at home with daughter  Occupation: retired, former work for mental health services (38yrs)  Tobacco Use: former smoker, quit ~20yrs ago, smoked 1ppd for ~30yrs  Alcohol Use: socially  Drug Use: denies  Sexual History: declined to answer  Functional Status: ambulates at home with cane

## 2020-08-15 NOTE — H&P ADULT - NSHPREVIEWOFSYSTEMS_GEN_ALL_CORE
CONSTITUTIONAL: No weakness, (+) fevers and chills; No headaches  EYES: No visual changes, eye pain, or discharge  ENT: No vertigo; No ear pain or change in hearing; No sore throat or difficulty swallowing  NECK: No pain or stiffness  RESPIRATORY: (+) cough, (+) wheezing, no hemoptysis, (+) shortness of breath  CARDIOVASCULAR: (+) pleuritic chest pain, no palpitations  GASTROINTESTINAL: No abdominal or epigastric pain; No nausea, vomiting, or hematemesis; No diarrhea, (+) constipation; No melena or hematochezia  GENITOURINARY: No dysuria, frequency or hematuria  MUSCULOSKELETAL: No joint pain, no muscle pain, no weakness  NEUROLOGICAL: No numbness or weakness  SKIN: No itching or rashes

## 2020-08-15 NOTE — ED PROVIDER NOTE - PROGRESS NOTE DETAILS
AA: 69F h/o HTN, HLD, COVID + earlier this week p/w worsening sxs. SpO2 92% on RA, RR24. D-Dimer 3000, will CT angio chest and admit. Roly: Pt endorsed to Dr. Trejo

## 2020-08-15 NOTE — ED PROVIDER NOTE - CLINICAL SUMMARY MEDICAL DECISION MAKING FREE TEXT BOX
69F p/w sob- worsening from 08/10 visit to the ED. Dx with covid-19 at the time. vs- hypoxic @ 88% on rm at initial eval overnight. currently 94% on 2L nc. cxr- with worsening bilat opacities. dimer elevated- concern for pe. case s/o pending ctpe- which was neg for PE. pt is high risk for decompensation. will admit for hypoxia and supportive care. given dexamethasone by previous team.

## 2020-08-15 NOTE — H&P ADULT - HISTORY OF PRESENT ILLNESS
[69y woman]    CC: worsening shortness of breath    PMH: hypertension, dyslipidemia, osteoarthritis s/p L knee replacement, and recent COVID-19 infection    PSH: L total knee replacement 2/2020, tonsillectomy    History of present illness goes back to five days ago when the patient presented to the [facility] for fever of 1 weeks duration associated with body aches, cough, and weakness. Patient was diagnosed with CAP and suspicion for COVID-19, and was discharged on azithromycin. COVID-19 test came back positive after the patient was discharged, but she felt fine at the time. At approximately 8pm on 8/14/2020, the patient developed a cough that progressively worsened over the course of a couple hours. During that time, her breathing became shallow and more labored. At approximately 1am, the patient had her son drive her to the ED. Patient also claims that she gets pneumonia or bronchitis every year.    In the ED, vital signs were Tmax 99.5F, HR 83, /65, RR 24, SpO2 95% on 3L O2 via nasal cannula. Patient received dexamethasone 10mg IV. Chest X-ray showed worsening bilateral opacities. D-dimer was 3033 on admission. CT angio chest was negative for pulmonary embolism, but showed significant bilateral patchy ground-glass opacities.

## 2020-08-15 NOTE — ED ADULT NURSE NOTE - NSIMPLEMENTINTERV_GEN_ALL_ED
Implemented All Universal Safety Interventions:  Lovely to call system. Call bell, personal items and telephone within reach. Instruct patient to call for assistance. Room bathroom lighting operational. Non-slip footwear when patient is off stretcher. Physically safe environment: no spills, clutter or unnecessary equipment. Stretcher in lowest position, wheels locked, appropriate side rails in place.

## 2020-08-15 NOTE — ED PROVIDER NOTE - ATTENDING CONTRIBUTION TO CARE
I personally evaluated the patient. I reviewed the Resident’s or Physician Assistant’s note (as assigned above), and agree with the findings and plan except as documented in my note.  69 F with hx of HTN, HLD with complaints of about 1 wk of fevers, associated with coughing and SOB. Pt was seen in the ED 5 days ago and d/c after labs and CXR done. Had + Covid result later and pt was informed. Denies travelling but states that family member visited 2 weeks ago from [place]. VS reviewed, pt non-toxic appearing,  but coughing as she is talking, NAD. Head ncat, MMM, neck supple, normal ROM, normal s1s2 without any murmurs, Lungs CTAB with normal work of breathing. abd +BS, s/nd/nt, extremities wnl, neuro exam grossly normal. No acute skin rashes. Plan is ekg, labs, monitor, O2, imaging and likely admission.

## 2020-08-15 NOTE — ED PROVIDER NOTE - NS ED ROS FT
General: Fever, chills.   Eyes:  No visual changes, eye pain or discharge.  ENMT:  No hearing changes, pain, no sore throat or runny nose, no difficulty swallowing  Cardiac: CP. No palpitations.  Respiratory: Cough, dyspnea.   GI:  No nausea, vomiting, diarrhea or abdominal pain.  :  No dysuria, frequency or burning.  MS:  No myalgia, muscle weakness, joint pain or back pain.  Neuro:  No headache.  No LOC. No change in ambulation. No dizziness.  Skin:  No skin rash.

## 2020-08-15 NOTE — CONSULT NOTE ADULT - ASSESSMENT
ASSESSMENT  69y F with PMH of OA, HLD, HTN for COVID-19 pneumonia    Osteoarthritis  Dyslipidemia  Hypertension    IMPRESSION  #COVID-19 pneumonia  - 8/10 COVID-19 PCR positive  - CTA Chest 8/15 with Multifocal bilateral patchy ground-glass and consolidative airspace opacities, consistent with viral pneumonia in the appropriate clinical setting.  - D-Dimer 8/15 3033  - Procalcitonin, Serum 8/15 0.06  - Ferritin, Serum: 289 ng/mL (08.15.20 @ 03:37)  - C-Reactive Protein, Serum: 8.78 mg/dL (08.15.20 @ 03:37)  - No trasmanitits, Cr stable    #Obesity BMI (kg/m2): 37.2, 37.2  #DM   #Abx allergy: NKDA      RECOMMENDATIONS  - remedsevir  - dexamethasome  - please obtain Covid-ab  - trend d-dimer, ferritin, procalcitonin  - if inflammatory markers continue to trend upward, will consider tocilizumab  - follow-up blood cultures    Spectra [phone]    This is a preliminary incomplete pended note, all final recommendations to follow after interview and examination of the patient. ASSESSMENT  69y F with PMH of OA, HLD, HTN for COVID-19 pneumonia    Osteoarthritis  Dyslipidemia  Hypertension    IMPRESSION  #COVID-19 pneumonia, Severe disease requiring supplemental O2  - 8/10 COVID-19 PCR positive  - CTA Chest 8/15 with Multifocal bilateral patchy ground-glass and consolidative airspace opacities, consistent with viral pneumonia in the appropriate clinical setting. Negative for PE  - D-Dimer 8/15 3033  - Procalcitonin, Serum 8/15 0.06  - Ferritin, Serum: 289 ng/mL (08.15.20 @ 03:37)  - C-Reactive Protein, Serum: 8.78 mg/dL (08.15.20 @ 03:37)  - No trasmanitits, Cr stable    #Obesity BMI (kg/m2): 37.2, 37.2  #DM   #Abx allergy: NKDA      RECOMMENDATIONS  - would start remdesivir 200 mg day 1, followed by 100 mg on day 2-4  - trend LFTs and Cr while on remedesevir  - onsolumedrol 600 mg q 12 hours  - please obtain Covid-ab; if negative, can consider convalescent plasma if clinical picture worsening  - trend d-dimer, ferritin, procalcitonin  - if inflammatory markers continue to trend upward, will consider tocilizumab, but can hold off for now  - follow-up blood cultures  - supplemental O2 as needed    Please call if with any questions.  Spectra [phone]

## 2020-08-15 NOTE — ED ADULT NURSE NOTE - OBJECTIVE STATEMENT
patient c/o fever, sob and cough since testing positive for covid on 8/10. wheezing heard upon assessment, admits to muscular chest pain from excessive coughing. denies n/v/d

## 2020-08-15 NOTE — H&P ADULT - NSHPLABSRESULTS_GEN_ALL_CORE
11.7   7.38  )-----------( 319      ( 15 Aug 2020 03:36 )             36.4     08-15    138  |  95<L>  |  8<L>  ----------------------------<  123<H>  3.6   |  31  |  0.7    Ca    9.4      15 Aug 2020 03:36    TPro  7.3  /  Alb  3.5  /  TBili  0.5  /  DBili  x   /  AST  38  /  ALT  23  /  AlkPhos  47  08-15    D-Dimer Assay, Quantitative: 3033: Manufacturers recommended Cut off for VTE is 230 ng/ml D-DU ng/mL DDU (08.15.20 @ 03:36)    Troponin T, Serum: <0.01 ng/mL (08-15-20 @ 03:36)    CARDIAC MARKERS ( 15 Aug 2020 03:36 )  x     / <0.01 ng/mL / x     / x     / x    < from: Xray Chest 1 View- PORTABLE-Urgent (08.15.20 @ 03:08) >  Findings:  Support devices: None.  Cardiac/mediastinum/hilum: Heart size within normal limits, thoracic aortic calcification.  Lung parenchyma/Pleura: Bilateral opacities.  Skeleton/soft tissues: Stable.    Impression:  Bilateral opacities, worsened.  < end of copied text >    < from: CT Angio Chest w/ IV Cont (08.15.20 @ 07:37) >  FINDINGS:  PULMONARY EMBOLUS: No evidence of acute pulmonary embolism.  LUNGS, PLEURA, AIRWAYS: Multifocal bilateral patchy groundglass and consolidative airspace opacities. No lobar mass, effusion, or pneumothorax. No evidence of central endobronchial obstruction. No bronchiectasis or honeycombing.  THORACIC NODES: Nonspecific prominent 1.6 cm short axis subcarinal lymph node, likely reactive.  MEDIASTINUM/GREAT VESSELS: No pericardial effusion. Heart size is within normal limits. The aorta and main pulmonary artery are of normal caliber. Small hiatal hernia.  BONES/SOFT TISSUES: Degenerative change, thoracic spine.  VISUALIZED UPPER ABDOMEN: Right renal cyst. Pancreatic body 0.9 cm lipoma.    IMPRESSION:  1. Multifocal bilateral patchy groundglass and consolidative airspace opacities, consistent with viral pneumonia in the appropriate clinical setting.  2. No evidence of acute pulmonary embolism.  < end of copied text >

## 2020-08-16 LAB
ALBUMIN SERPL ELPH-MCNC: 3.4 G/DL — LOW (ref 3.5–5.2)
ALBUMIN SERPL ELPH-MCNC: 3.4 G/DL — LOW (ref 3.5–5.2)
ALP SERPL-CCNC: 44 U/L — SIGNIFICANT CHANGE UP (ref 30–115)
ALP SERPL-CCNC: 45 U/L — SIGNIFICANT CHANGE UP (ref 30–115)
ALT FLD-CCNC: 21 U/L — SIGNIFICANT CHANGE UP (ref 0–41)
ALT FLD-CCNC: 22 U/L — SIGNIFICANT CHANGE UP (ref 0–41)
ANION GAP SERPL CALC-SCNC: 9 MMOL/L — SIGNIFICANT CHANGE UP (ref 7–14)
AST SERPL-CCNC: 27 U/L — SIGNIFICANT CHANGE UP (ref 0–41)
AST SERPL-CCNC: 29 U/L — SIGNIFICANT CHANGE UP (ref 0–41)
BASOPHILS # BLD AUTO: 0.01 K/UL — SIGNIFICANT CHANGE UP (ref 0–0.2)
BASOPHILS NFR BLD AUTO: 0.1 % — SIGNIFICANT CHANGE UP (ref 0–1)
BILIRUB DIRECT SERPL-MCNC: <0.2 MG/DL — SIGNIFICANT CHANGE UP (ref 0–0.2)
BILIRUB DIRECT SERPL-MCNC: <0.2 MG/DL — SIGNIFICANT CHANGE UP (ref 0–0.2)
BILIRUB INDIRECT FLD-MCNC: >0 MG/DL — LOW (ref 0.2–1.2)
BILIRUB INDIRECT FLD-MCNC: >0.4 MG/DL — SIGNIFICANT CHANGE UP (ref 0.2–1.2)
BILIRUB SERPL-MCNC: 0.2 MG/DL — SIGNIFICANT CHANGE UP (ref 0.2–1.2)
BILIRUB SERPL-MCNC: 0.6 MG/DL — SIGNIFICANT CHANGE UP (ref 0.2–1.2)
BUN SERPL-MCNC: 12 MG/DL — SIGNIFICANT CHANGE UP (ref 10–20)
CALCIUM SERPL-MCNC: 9.5 MG/DL — SIGNIFICANT CHANGE UP (ref 8.5–10.1)
CHLORIDE SERPL-SCNC: 99 MMOL/L — SIGNIFICANT CHANGE UP (ref 98–110)
CHOLEST SERPL-MCNC: 177 MG/DL — SIGNIFICANT CHANGE UP (ref 100–200)
CO2 SERPL-SCNC: 30 MMOL/L — SIGNIFICANT CHANGE UP (ref 17–32)
CREAT SERPL-MCNC: 0.7 MG/DL — SIGNIFICANT CHANGE UP (ref 0.7–1.5)
CREAT SERPL-MCNC: 0.7 MG/DL — SIGNIFICANT CHANGE UP (ref 0.7–1.5)
EOSINOPHIL # BLD AUTO: 0 K/UL — SIGNIFICANT CHANGE UP (ref 0–0.7)
EOSINOPHIL NFR BLD AUTO: 0 % — SIGNIFICANT CHANGE UP (ref 0–8)
GLUCOSE BLDC GLUCOMTR-MCNC: 212 MG/DL — HIGH (ref 70–99)
GLUCOSE BLDC GLUCOMTR-MCNC: 289 MG/DL — HIGH (ref 70–99)
GLUCOSE BLDC GLUCOMTR-MCNC: 291 MG/DL — HIGH (ref 70–99)
GLUCOSE BLDC GLUCOMTR-MCNC: 485 MG/DL — CRITICAL HIGH (ref 70–99)
GLUCOSE SERPL-MCNC: 180 MG/DL — HIGH (ref 70–99)
HCT VFR BLD CALC: 37 % — SIGNIFICANT CHANGE UP (ref 37–47)
HDLC SERPL-MCNC: 46 MG/DL — LOW
HGB BLD-MCNC: 11.8 G/DL — LOW (ref 12–16)
IMM GRANULOCYTES NFR BLD AUTO: 0.7 % — HIGH (ref 0.1–0.3)
LIPID PNL WITH DIRECT LDL SERPL: 99 MG/DL — SIGNIFICANT CHANGE UP (ref 4–129)
LYMPHOCYTES # BLD AUTO: 18.4 % — LOW (ref 20.5–51.1)
LYMPHOCYTES # BLD AUTO: 2.24 K/UL — SIGNIFICANT CHANGE UP (ref 1.2–3.4)
MAGNESIUM SERPL-MCNC: 2.5 MG/DL — HIGH (ref 1.8–2.4)
MCHC RBC-ENTMCNC: 27.6 PG — SIGNIFICANT CHANGE UP (ref 27–31)
MCHC RBC-ENTMCNC: 31.9 G/DL — LOW (ref 32–37)
MCV RBC AUTO: 86.4 FL — SIGNIFICANT CHANGE UP (ref 81–99)
MONOCYTES # BLD AUTO: 0.68 K/UL — HIGH (ref 0.1–0.6)
MONOCYTES NFR BLD AUTO: 5.6 % — SIGNIFICANT CHANGE UP (ref 1.7–9.3)
NEUTROPHILS # BLD AUTO: 9.19 K/UL — HIGH (ref 1.4–6.5)
NEUTROPHILS NFR BLD AUTO: 75.2 % — SIGNIFICANT CHANGE UP (ref 42.2–75.2)
NRBC # BLD: 0 /100 WBCS — SIGNIFICANT CHANGE UP (ref 0–0)
PLATELET # BLD AUTO: 378 K/UL — SIGNIFICANT CHANGE UP (ref 130–400)
POTASSIUM SERPL-MCNC: 3.6 MMOL/L — SIGNIFICANT CHANGE UP (ref 3.5–5)
POTASSIUM SERPL-SCNC: 3.6 MMOL/L — SIGNIFICANT CHANGE UP (ref 3.5–5)
PROT SERPL-MCNC: 7 G/DL — SIGNIFICANT CHANGE UP (ref 6–8)
PROT SERPL-MCNC: 7 G/DL — SIGNIFICANT CHANGE UP (ref 6–8)
RBC # BLD: 4.28 M/UL — SIGNIFICANT CHANGE UP (ref 4.2–5.4)
RBC # FLD: 14.4 % — SIGNIFICANT CHANGE UP (ref 11.5–14.5)
SARS-COV-2 RNA SPEC QL NAA+PROBE: DETECTED
SODIUM SERPL-SCNC: 138 MMOL/L — SIGNIFICANT CHANGE UP (ref 135–146)
TOTAL CHOLESTEROL/HDL RATIO MEASUREMENT: 3.8 RATIO — LOW (ref 4–5.5)
TRIGL SERPL-MCNC: 95 MG/DL — SIGNIFICANT CHANGE UP (ref 10–149)
WBC # BLD: 12.2 K/UL — HIGH (ref 4.8–10.8)
WBC # FLD AUTO: 12.2 K/UL — HIGH (ref 4.8–10.8)

## 2020-08-16 PROCEDURE — 99223 1ST HOSP IP/OBS HIGH 75: CPT | Mod: CS

## 2020-08-16 RX ORDER — DEXTROSE 50 % IN WATER 50 %
25 SYRINGE (ML) INTRAVENOUS ONCE
Refills: 0 | Status: DISCONTINUED | OUTPATIENT
Start: 2020-08-16 | End: 2020-08-19

## 2020-08-16 RX ORDER — INSULIN LISPRO 100/ML
6 VIAL (ML) SUBCUTANEOUS ONCE
Refills: 0 | Status: COMPLETED | OUTPATIENT
Start: 2020-08-16 | End: 2020-08-16

## 2020-08-16 RX ORDER — DEXTROSE 50 % IN WATER 50 %
15 SYRINGE (ML) INTRAVENOUS ONCE
Refills: 0 | Status: DISCONTINUED | OUTPATIENT
Start: 2020-08-16 | End: 2020-08-19

## 2020-08-16 RX ORDER — INSULIN LISPRO 100/ML
VIAL (ML) SUBCUTANEOUS
Refills: 0 | Status: DISCONTINUED | OUTPATIENT
Start: 2020-08-16 | End: 2020-08-19

## 2020-08-16 RX ORDER — SODIUM CHLORIDE 9 MG/ML
1000 INJECTION, SOLUTION INTRAVENOUS
Refills: 0 | Status: DISCONTINUED | OUTPATIENT
Start: 2020-08-16 | End: 2020-08-19

## 2020-08-16 RX ORDER — SENNA PLUS 8.6 MG/1
2 TABLET ORAL AT BEDTIME
Refills: 0 | Status: DISCONTINUED | OUTPATIENT
Start: 2020-08-16 | End: 2020-08-19

## 2020-08-16 RX ORDER — DEXTROSE 50 % IN WATER 50 %
12.5 SYRINGE (ML) INTRAVENOUS ONCE
Refills: 0 | Status: DISCONTINUED | OUTPATIENT
Start: 2020-08-16 | End: 2020-08-19

## 2020-08-16 RX ORDER — GLUCAGON INJECTION, SOLUTION 0.5 MG/.1ML
1 INJECTION, SOLUTION SUBCUTANEOUS ONCE
Refills: 0 | Status: DISCONTINUED | OUTPATIENT
Start: 2020-08-16 | End: 2020-08-19

## 2020-08-16 RX ADMIN — Medication 60 MILLIGRAM(S): at 05:15

## 2020-08-16 RX ADMIN — ENOXAPARIN SODIUM 90 MILLIGRAM(S): 100 INJECTION SUBCUTANEOUS at 17:00

## 2020-08-16 RX ADMIN — Medication 2: at 17:00

## 2020-08-16 RX ADMIN — ENOXAPARIN SODIUM 90 MILLIGRAM(S): 100 INJECTION SUBCUTANEOUS at 05:15

## 2020-08-16 RX ADMIN — ATORVASTATIN CALCIUM 80 MILLIGRAM(S): 80 TABLET, FILM COATED ORAL at 21:46

## 2020-08-16 RX ADMIN — Medication 5 MILLIGRAM(S): at 21:46

## 2020-08-16 RX ADMIN — Medication 60 MILLIGRAM(S): at 17:00

## 2020-08-16 RX ADMIN — Medication 81 MILLIGRAM(S): at 11:17

## 2020-08-16 RX ADMIN — REMDESIVIR 500 MILLIGRAM(S): 5 INJECTION INTRAVENOUS at 17:02

## 2020-08-16 RX ADMIN — SENNA PLUS 2 TABLET(S): 8.6 TABLET ORAL at 21:46

## 2020-08-16 RX ADMIN — Medication 2 GRAM(S): at 11:18

## 2020-08-16 RX ADMIN — Medication 25 MILLIGRAM(S): at 05:16

## 2020-08-16 RX ADMIN — Medication 6 UNIT(S): at 12:07

## 2020-08-16 RX ADMIN — AMLODIPINE BESYLATE 5 MILLIGRAM(S): 2.5 TABLET ORAL at 05:13

## 2020-08-16 RX ADMIN — CHLORHEXIDINE GLUCONATE 1 APPLICATION(S): 213 SOLUTION TOPICAL at 05:15

## 2020-08-16 RX ADMIN — PANTOPRAZOLE SODIUM 40 MILLIGRAM(S): 20 TABLET, DELAYED RELEASE ORAL at 06:05

## 2020-08-16 NOTE — PROGRESS NOTE ADULT - ASSESSMENT
Patient is a 70yo female with PMH of hypertension, dyslipidemia, and osteoarthritis s/p L knee replacement who presented to the ED complaining of worsening shortness of breath. Patient was previously seen in the [facility] ED on 8/10/2020 and tested positive for COVID-19.    #Acute respiratory distress secondary to COVID-19 pneumonia  - Sepsis not present on admission (HR 83, afebrile, no leukocytosis, tachypneic)  - COVID-19 PCR 8/10/2020: positive, 8/15: Positive  - Chest X-ray 8/15/2020: worsening bilateral infiltrates  - CT angio chest 8/15/2020: Multifocal bilateral patchy ground-glass and consolidative airspace opacities, consistent with viral pneumonia in the appropriate clinical setting. No evidence of acute pulmonary embolism.  - D-dimer: 3033 (elevated)  - LDH: 375 (elevated)  - Procalcitonin: 0.06, CRP: 8.78, Ferritin: 289, no transaminitis, or DAJUAN  - Triglycerides: 128  - Repeat procalcitonin and CRP Q48H  - No lymphopenia on admission or previous ED visit  - Patient currently saturating 95% on 2L O2 via nasal cannula   - Titrate supplemental O2 to maintain SpO2 92-96%  - ID following: currently on Remdesavir, trend LFTs  - Covid Antibody pending, for possible plasma  - Pulmonary embolism ruled out on admission via CT angio  - c/w Lovenox 90mg SQ Q12H  - c/w  methylprednisolone 60mg IV Q12H, monitor FS  - c/w pantoprazole 40mg PO QD for GI prophylaxis while on steroids    #Hyperglycemia likely due to Steroids vs DM II  - f/u [facility]  - Patient was on metformin 500mg PO BID earlier this year, but she claims it was for weight loss prior to her knee replacement  - FS AC/HS + sliding scale    #Chest pain, likely pleuritic vs costochondritis, resolved  - Troponin negative x1, trend  - No acute ST changes on ECG  - No intervention at this time    #Hypertension  - Continue with amlodipine 5mg PO QD  - Convert home medication chlorthalidone to hydrochlorothiazide 25mg PO QD  - Monitor blood pressure    #Dyslipidemia  - Continue with atorvastatin 80mg PO QHS and omega-3 2mg PO QD    #Morbid obesity  - Counseled patient on weight loss and lifestyle modifications    #Misc  - DVT Prophylaxis: Lovenox 90mg SQ Q12H   - GI Prophylaxis: pantoprazole 40mg PO QD   - Diet: DASH/TLC  - Activity: ambulate with walker  - IV Fluids: not indicated  - Code Status: Full Code

## 2020-08-16 NOTE — PROGRESS NOTE ADULT - SUBJECTIVE AND OBJECTIVE BOX
GRAHAM STUBBS 69y Female  MRN#: [id number]   CODE STATUS:________      SUBJECTIVE  Patient is a 69y old Female who presents with a chief complaint of COVID-19 pneumonia (15 Aug 2020 14:44)  Currently admitted to medicine with the primary diagnosis of COVID-19    Today is hospital day 1d, and this morning she is resting in bed, no acute events overnight. Patient is currently on 2L NC SPO2 96%. She denies any acute chest pain, sob, dyspnea, headaches or abdominal discomfort. She is otherwise hemodynamically stable           OBJECTIVE  PAST MEDICAL & SURGICAL HISTORY  Osteoarthritis  Dyslipidemia  Hypertension  History of tonsillectomy  History of left knee replacement: 2/2020    ALLERGIES:  No Known Allergies    MEDICATIONS:  STANDING MEDICATIONS  amLODIPine   Tablet 5 milliGRAM(s) Oral daily  aspirin enteric coated 81 milliGRAM(s) Oral daily  atorvastatin 80 milliGRAM(s) Oral at bedtime  chlorhexidine 4% Liquid 1 Application(s) Topical <User Schedule>  dextrose 5%. 1000 milliLiter(s) IV Continuous <Continuous>  dextrose 50% Injectable 12.5 Gram(s) IV Push once  dextrose 50% Injectable 25 Gram(s) IV Push once  dextrose 50% Injectable 25 Gram(s) IV Push once  enoxaparin Injectable 90 milliGRAM(s) SubCutaneous every 12 hours  hydrochlorothiazide 25 milliGRAM(s) Oral daily  insulin lispro (HumaLOG) corrective regimen sliding scale   SubCutaneous three times a day before meals  methylPREDNISolone sodium succinate Injectable 60 milliGRAM(s) IV Push every 12 hours  omega-3-Acid Ethyl Esters 2 Gram(s) Oral daily  pantoprazole    Tablet 40 milliGRAM(s) Oral before breakfast  remdesivir  IVPB   IV Intermittent   remdesivir  IVPB 100 milliGRAM(s) IV Intermittent every 24 hours    PRN MEDICATIONS  dextrose 40% Gel 15 Gram(s) Oral once PRN  glucagon  Injectable 1 milliGRAM(s) IntraMuscular once PRN      VITAL SIGNS: Last 24 Hours  T(C): 36.4 (16 Aug 2020 04:40), Max: 36.9 (15 Aug 2020 21:19)  T(F): 97.5 (16 Aug 2020 04:40), Max: 98.5 (15 Aug 2020 21:19)  HR: 65 (16 Aug 2020 04:40) (65 - 84)  BP: 134/60 (16 Aug 2020 04:40) (134/60 - 149/73)  BP(mean): --  RR: 18 (15 Aug 2020 21:19) (18 - 18)  SpO2: 96% (16 Aug 2020 08:20) (96% - 99%)    LABS:                        11.8   12.20 )-----------( 378      ( 16 Aug 2020 06:46 )             37.0     08-16    138  |  99  |  12  ----------------------------<  180<H>  3.6   |  30  |  0.7    Ca    9.5      16 Aug 2020 06:46  Mg     2.5     08-16    TPro  7.0  /  Alb  3.4<L>  /  TBili  0.6  /  DBili  <0.2  /  AST  27  /  ALT  21  /  AlkPhos  45  08-16    PT/INR - ( 15 Aug 2020 11:44 )   PT: 11.60 sec;   INR: 1.01 ratio         PTT - ( 15 Aug 2020 11:44 )  PTT:26.7 sec          CARDIAC MARKERS ( 15 Aug 2020 03:36 )  x     / <0.01 ng/mL / x     / x     / x          PHYSICAL EXAM:    GENERAL: NAD, obese  HEENT:  Atraumatic, Normocephalic.   PULMONARY: bibasilar crackles, no wheezing  CARDIOVASCULAR: Regular rate and rhythm; No murmurs, rubs, or gallops  GASTROINTESTINAL: Soft, Nontender, Nondistended; Bowel sounds present  MUSCULOSKELETAL:  2+ Peripheral Pulses, no LE edema   NEUROLOGY: non-focal

## 2020-08-17 LAB
A1C WITH ESTIMATED AVERAGE GLUCOSE RESULT: 6.7 % — HIGH (ref 4–5.6)
A1C WITH ESTIMATED AVERAGE GLUCOSE RESULT: 6.8 % — HIGH (ref 4–5.6)
ALBUMIN SERPL ELPH-MCNC: 3.1 G/DL — LOW (ref 3.5–5.2)
ALBUMIN SERPL ELPH-MCNC: 3.3 G/DL — LOW (ref 3.5–5.2)
ALP SERPL-CCNC: 43 U/L — SIGNIFICANT CHANGE UP (ref 30–115)
ALP SERPL-CCNC: 43 U/L — SIGNIFICANT CHANGE UP (ref 30–115)
ALT FLD-CCNC: 20 U/L — SIGNIFICANT CHANGE UP (ref 0–41)
ALT FLD-CCNC: 20 U/L — SIGNIFICANT CHANGE UP (ref 0–41)
ANION GAP SERPL CALC-SCNC: 9 MMOL/L — SIGNIFICANT CHANGE UP (ref 7–14)
AST SERPL-CCNC: 21 U/L — SIGNIFICANT CHANGE UP (ref 0–41)
AST SERPL-CCNC: 23 U/L — SIGNIFICANT CHANGE UP (ref 0–41)
BASOPHILS # BLD AUTO: 0.02 K/UL — SIGNIFICANT CHANGE UP (ref 0–0.2)
BASOPHILS NFR BLD AUTO: 0.1 % — SIGNIFICANT CHANGE UP (ref 0–1)
BILIRUB DIRECT SERPL-MCNC: <0.2 MG/DL — SIGNIFICANT CHANGE UP (ref 0–0.2)
BILIRUB INDIRECT FLD-MCNC: SIGNIFICANT CHANGE UP MG/DL (ref 0.2–1.2)
BILIRUB SERPL-MCNC: 0.3 MG/DL — SIGNIFICANT CHANGE UP (ref 0.2–1.2)
BILIRUB SERPL-MCNC: <0.2 MG/DL — SIGNIFICANT CHANGE UP (ref 0.2–1.2)
BUN SERPL-MCNC: 19 MG/DL — SIGNIFICANT CHANGE UP (ref 10–20)
CALCIUM SERPL-MCNC: 9.6 MG/DL — SIGNIFICANT CHANGE UP (ref 8.5–10.1)
CHLORIDE SERPL-SCNC: 101 MMOL/L — SIGNIFICANT CHANGE UP (ref 98–110)
CO2 SERPL-SCNC: 29 MMOL/L — SIGNIFICANT CHANGE UP (ref 17–32)
CREAT SERPL-MCNC: 0.7 MG/DL — SIGNIFICANT CHANGE UP (ref 0.7–1.5)
CREAT SERPL-MCNC: 0.7 MG/DL — SIGNIFICANT CHANGE UP (ref 0.7–1.5)
CRP SERPL-MCNC: 3.31 MG/DL — HIGH (ref 0–0.4)
EOSINOPHIL # BLD AUTO: 0 K/UL — SIGNIFICANT CHANGE UP (ref 0–0.7)
EOSINOPHIL NFR BLD AUTO: 0 % — SIGNIFICANT CHANGE UP (ref 0–8)
ESTIMATED AVERAGE GLUCOSE: 146 MG/DL — HIGH (ref 68–114)
ESTIMATED AVERAGE GLUCOSE: 148 MG/DL — HIGH (ref 68–114)
FIBRINOGEN PPP-MCNC: 526 MG/DL — SIGNIFICANT CHANGE UP (ref 204.4–570.6)
GLUCOSE BLDC GLUCOMTR-MCNC: 169 MG/DL — HIGH (ref 70–99)
GLUCOSE BLDC GLUCOMTR-MCNC: 184 MG/DL — HIGH (ref 70–99)
GLUCOSE BLDC GLUCOMTR-MCNC: 215 MG/DL — HIGH (ref 70–99)
GLUCOSE BLDC GLUCOMTR-MCNC: 261 MG/DL — HIGH (ref 70–99)
GLUCOSE SERPL-MCNC: 171 MG/DL — HIGH (ref 70–99)
HCT VFR BLD CALC: 35.9 % — LOW (ref 37–47)
HCV AB S/CO SERPL IA: 152.3 COI — HIGH
HCV AB SERPL-IMP: REACTIVE
HGB BLD-MCNC: 11.1 G/DL — LOW (ref 12–16)
IMM GRANULOCYTES NFR BLD AUTO: 0.7 % — HIGH (ref 0.1–0.3)
INR BLD: 1.04 RATIO — SIGNIFICANT CHANGE UP (ref 0.65–1.3)
LYMPHOCYTES # BLD AUTO: 16.3 % — LOW (ref 20.5–51.1)
LYMPHOCYTES # BLD AUTO: 2.23 K/UL — SIGNIFICANT CHANGE UP (ref 1.2–3.4)
MAGNESIUM SERPL-MCNC: 2.4 MG/DL — SIGNIFICANT CHANGE UP (ref 1.8–2.4)
MCHC RBC-ENTMCNC: 27.3 PG — SIGNIFICANT CHANGE UP (ref 27–31)
MCHC RBC-ENTMCNC: 30.9 G/DL — LOW (ref 32–37)
MCV RBC AUTO: 88.2 FL — SIGNIFICANT CHANGE UP (ref 81–99)
MONOCYTES # BLD AUTO: 1.22 K/UL — HIGH (ref 0.1–0.6)
MONOCYTES NFR BLD AUTO: 8.9 % — SIGNIFICANT CHANGE UP (ref 1.7–9.3)
NEUTROPHILS # BLD AUTO: 10.08 K/UL — HIGH (ref 1.4–6.5)
NEUTROPHILS NFR BLD AUTO: 74 % — SIGNIFICANT CHANGE UP (ref 42.2–75.2)
NRBC # BLD: 0 /100 WBCS — SIGNIFICANT CHANGE UP (ref 0–0)
PLATELET # BLD AUTO: 421 K/UL — HIGH (ref 130–400)
POTASSIUM SERPL-MCNC: 4 MMOL/L — SIGNIFICANT CHANGE UP (ref 3.5–5)
POTASSIUM SERPL-SCNC: 4 MMOL/L — SIGNIFICANT CHANGE UP (ref 3.5–5)
PROCALCITONIN SERPL-MCNC: 0.04 NG/ML — SIGNIFICANT CHANGE UP (ref 0.02–0.1)
PROT SERPL-MCNC: 6.5 G/DL — SIGNIFICANT CHANGE UP (ref 6–8)
PROT SERPL-MCNC: 6.7 G/DL — SIGNIFICANT CHANGE UP (ref 6–8)
PROTHROM AB SERPL-ACNC: 12 SEC — SIGNIFICANT CHANGE UP (ref 9.95–12.87)
RBC # BLD: 4.07 M/UL — LOW (ref 4.2–5.4)
RBC # FLD: 14.6 % — HIGH (ref 11.5–14.5)
SODIUM SERPL-SCNC: 139 MMOL/L — SIGNIFICANT CHANGE UP (ref 135–146)
WBC # BLD: 13.64 K/UL — HIGH (ref 4.8–10.8)
WBC # FLD AUTO: 13.64 K/UL — HIGH (ref 4.8–10.8)

## 2020-08-17 PROCEDURE — 99233 SBSQ HOSP IP/OBS HIGH 50: CPT | Mod: CS

## 2020-08-17 RX ORDER — DEXAMETHASONE 0.5 MG/5ML
6 ELIXIR ORAL DAILY
Refills: 0 | Status: COMPLETED | OUTPATIENT
Start: 2020-08-18 | End: 2020-08-19

## 2020-08-17 RX ORDER — ENOXAPARIN SODIUM 100 MG/ML
40 INJECTION SUBCUTANEOUS
Refills: 0 | Status: DISCONTINUED | OUTPATIENT
Start: 2020-08-17 | End: 2020-08-17

## 2020-08-17 RX ADMIN — REMDESIVIR 500 MILLIGRAM(S): 5 INJECTION INTRAVENOUS at 16:08

## 2020-08-17 RX ADMIN — ATORVASTATIN CALCIUM 80 MILLIGRAM(S): 80 TABLET, FILM COATED ORAL at 21:22

## 2020-08-17 RX ADMIN — AMLODIPINE BESYLATE 5 MILLIGRAM(S): 2.5 TABLET ORAL at 05:30

## 2020-08-17 RX ADMIN — Medication 25 MILLIGRAM(S): at 05:30

## 2020-08-17 RX ADMIN — Medication 3: at 11:46

## 2020-08-17 RX ADMIN — ENOXAPARIN SODIUM 90 MILLIGRAM(S): 100 INJECTION SUBCUTANEOUS at 05:30

## 2020-08-17 RX ADMIN — Medication 2: at 16:58

## 2020-08-17 RX ADMIN — Medication 1: at 07:57

## 2020-08-17 RX ADMIN — Medication 2 GRAM(S): at 11:37

## 2020-08-17 RX ADMIN — CHLORHEXIDINE GLUCONATE 1 APPLICATION(S): 213 SOLUTION TOPICAL at 05:30

## 2020-08-17 RX ADMIN — PANTOPRAZOLE SODIUM 40 MILLIGRAM(S): 20 TABLET, DELAYED RELEASE ORAL at 06:29

## 2020-08-17 RX ADMIN — Medication 60 MILLIGRAM(S): at 05:30

## 2020-08-17 RX ADMIN — Medication 81 MILLIGRAM(S): at 11:37

## 2020-08-17 RX ADMIN — Medication 5 MILLIGRAM(S): at 21:22

## 2020-08-17 RX ADMIN — SENNA PLUS 2 TABLET(S): 8.6 TABLET ORAL at 21:22

## 2020-08-17 NOTE — CHART NOTE - NSCHARTNOTEFT_GEN_A_CORE
Patient meets eligibility for HEP COVID Trial. An informed consent was obtained and will be kept in the research chart for the patient. Patient's anticoagulant dose will be blinded. If you have any Qs you can call Dr Whitfield ([phone]) or Research contacts (Dior or Yue): [phone] and [phone].

## 2020-08-17 NOTE — PROGRESS NOTE ADULT - SUBJECTIVE AND OBJECTIVE BOX
GRAHAM STUBBS 69y Female  MRN#: [id number]   CODE STATUS: FULL      SUBJECTIVE  Patient is a 69y old Female who presents with a chief complaint of COVID-19 pneumonia (17 Aug 2020 08:03)    Currently admitted to medicine with the primary diagnosis of     Today is hospital day 2d,   INTERVAL HPI/OVERNIGHT EVENTS:    This morning she is laying in bed comfortably .   Denies chest pain, shortness of breath, abdominal pain, nausea, vomiting or changes in bowel habits.   has no complaints today.     Urinating and stooling appropriately.    Present Today:           Orr Catheter (x)No/ ()Yes?   Indication:             Central Line (x)No/ ()Yes?   Indication:          IV Fluids (x)No/ ()Yes? Type:  Rate:  Indication:    OBJECTIVE  PAST MEDICAL & SURGICAL HISTORY  Osteoarthritis  Dyslipidemia  Hypertension  History of tonsillectomy  History of left knee replacement: 2/2020    ALLERGIES:  No Known Allergies    HOME MEDICATIONS:  Home Medications:  amLODIPine 5 mg oral tablet: 1 tab(s) orally once a day (15 Aug 2020 10:09)  aspirin 81 mg oral tablet: 1 tab(s) orally once a day (15 Aug 2020 10:09)  atorvastatin 80 mg oral tablet: 1 tab(s) orally once a day (15 Aug 2020 10:09)  chlorthalidone 25 mg oral tablet: 1 tab(s) orally once a day (15 Aug 2020 10:09)  Omega-3 oral capsule: 1 milligram(s) orally once a day (15 Aug 2020 10:09)    MEDICATIONS:  STANDING MEDICATIONS  amLODIPine   Tablet 5 milliGRAM(s) Oral daily  aspirin enteric coated 81 milliGRAM(s) Oral daily  atorvastatin 80 milliGRAM(s) Oral at bedtime  bisacodyl 5 milliGRAM(s) Oral at bedtime  chlorhexidine 4% Liquid 1 Application(s) Topical <User Schedule>  dextrose 5%. 1000 milliLiter(s) (50 mL/Hr) IV Continuous <Continuous>  dextrose 50% Injectable 12.5 Gram(s) IV Push once  dextrose 50% Injectable 25 Gram(s) IV Push once  dextrose 50% Injectable 25 Gram(s) IV Push once  enoxaparin Injectable 90 milliGRAM(s) SubCutaneous every 12 hours  hydrochlorothiazide 25 milliGRAM(s) Oral daily  insulin lispro (HumaLOG) corrective regimen sliding scale   SubCutaneous three times a day before meals  methylPREDNISolone sodium succinate Injectable 60 milliGRAM(s) IV Push every 12 hours  omega-3-Acid Ethyl Esters 2 Gram(s) Oral daily  pantoprazole    Tablet 40 milliGRAM(s) Oral before breakfast  remdesivir  IVPB   IV Intermittent   remdesivir  IVPB 100 milliGRAM(s) IV Intermittent every 24 hours  senna 2 Tablet(s) Oral at bedtime    PRN MEDICATIONS  dextrose 40% Gel 15 Gram(s) Oral once PRN  glucagon  Injectable 1 milliGRAM(s) IntraMuscular once PRN      VITAL SIGNS: Last 24 Hours  T(C): 36.4 (17 Aug 2020 04:20), Max: 36.6 (16 Aug 2020 13:20)  T(F): 97.6 (17 Aug 2020 04:20), Max: 97.9 (16 Aug 2020 13:20)  HR: 76 (16 Aug 2020 13:20) (76 - 76)  BP: 126/59 (17 Aug 2020 04:20) (126/59 - 137/65)  BP(mean): --  RR: 18 (17 Aug 2020 04:20) (18 - 18)  SpO2: 95% (17 Aug 2020 05:00) (95% - 100%)    LABS:                        11.1   13.64 )-----------( 421      ( 17 Aug 2020 05:27 )             35.9     08-17    139  |  101  |  19  ----------------------------<  171<H>  4.0   |  29  |  0.7    Ca    9.6      17 Aug 2020 05:27  Mg     2.4     08-17    TPro  6.7  /  Alb  3.3<L>  /  TBili  0.3  /  DBili  x   /  AST  21  /  ALT  20  /  AlkPhos  43  08-17    PT/INR - ( 15 Aug 2020 11:44 )   PT: 11.60 sec;   INR: 1.01 ratio         PTT - ( 15 Aug 2020 11:44 )  PTT:26.7 sec    Trend Procalcitonin  08-15-20 @ 03:37   -  0.06  08-15-20 @ 03:36   -  0.05    Ferritin, Serum: 289 ng/mL (08.15.20 @ 03:37)    Lactate Dehydrogenase, Serum: 375 (08.15.20 @ 03:36)    C-Reactive Protein, Serum: 8.78 mg/dL (08.15.20 @ 03:37)      RADIOLOGY:   VA Duplex Lower Ext Vein Scan, Bilat (08.15.20 @ 11:05) >  No evidence of deep venous thrombosis or superficial thrombophlebitis in the bilateral lower extremities.    CT Angio Chest w/ IV Cont (08.15.20 @ 07:37) >    1. Multifocal bilateral patchy groundglass and consolidative airspace opacities, consistent with viral pneumonia in the appropriate clinical setting.    2. No evidence of acute pulmonary embolism.        ECHO:      PHYSICAL EXAM:  GENERAL: NAD, obese  HEENT:  Atraumatic, Normocephalic.   PULMONARY: bibasilar crackles, no wheezing  CARDIOVASCULAR: Regular rate and rhythm; No murmurs, rubs, or gallops  GASTROINTESTINAL: Soft, Nontender, Nondistended; Bowel sounds present  MUSCULOSKELETAL:  2+ Peripheral Pulses, no LE edema   NEUROLOGY: non-focal  SKIN: No rashes or lesions GRAHAM STUBBS 69y Female  MRN#: [id number]   CODE STATUS: FULL      SUBJECTIVE  Patient is a 69y old Female who presents with a chief complaint of COVID-19 pneumonia (17 Aug 2020 08:03)    Currently admitted to medicine with the primary diagnosis of Acute hypoxic respiratory failure sec to COVID 19 pneumonia - improving    Today is hospital day 2d,   INTERVAL HPI/OVERNIGHT EVENTS:    This morning she is laying in bed comfortably .   Denies chest pain, shortness of breath, abdominal pain, nausea, vomiting or changes in bowel habits.   has no complaints today.     Urinating and stooling appropriately.    Present Today:           Orr Catheter (x)No/ ()Yes?   Indication:             Central Line (x)No/ ()Yes?   Indication:          IV Fluids (x)No/ ()Yes? Type:  Rate:  Indication:    OBJECTIVE  PAST MEDICAL & SURGICAL HISTORY  Osteoarthritis  Dyslipidemia  Hypertension  History of tonsillectomy  History of left knee replacement: 2/2020    ALLERGIES:  No Known Allergies    HOME MEDICATIONS:  Home Medications:  amLODIPine 5 mg oral tablet: 1 tab(s) orally once a day (15 Aug 2020 10:09)  aspirin 81 mg oral tablet: 1 tab(s) orally once a day (15 Aug 2020 10:09)  atorvastatin 80 mg oral tablet: 1 tab(s) orally once a day (15 Aug 2020 10:09)  chlorthalidone 25 mg oral tablet: 1 tab(s) orally once a day (15 Aug 2020 10:09)  Omega-3 oral capsule: 1 milligram(s) orally once a day (15 Aug 2020 10:09)    MEDICATIONS:  STANDING MEDICATIONS  amLODIPine   Tablet 5 milliGRAM(s) Oral daily  aspirin enteric coated 81 milliGRAM(s) Oral daily  atorvastatin 80 milliGRAM(s) Oral at bedtime  bisacodyl 5 milliGRAM(s) Oral at bedtime  chlorhexidine 4% Liquid 1 Application(s) Topical <User Schedule>  dextrose 5%. 1000 milliLiter(s) (50 mL/Hr) IV Continuous <Continuous>  dextrose 50% Injectable 12.5 Gram(s) IV Push once  dextrose 50% Injectable 25 Gram(s) IV Push once  dextrose 50% Injectable 25 Gram(s) IV Push once  enoxaparin Injectable 90 milliGRAM(s) SubCutaneous every 12 hours  hydrochlorothiazide 25 milliGRAM(s) Oral daily  insulin lispro (HumaLOG) corrective regimen sliding scale   SubCutaneous three times a day before meals  methylPREDNISolone sodium succinate Injectable 60 milliGRAM(s) IV Push every 12 hours  omega-3-Acid Ethyl Esters 2 Gram(s) Oral daily  pantoprazole    Tablet 40 milliGRAM(s) Oral before breakfast  remdesivir  IVPB   IV Intermittent   remdesivir  IVPB 100 milliGRAM(s) IV Intermittent every 24 hours  senna 2 Tablet(s) Oral at bedtime    PRN MEDICATIONS  dextrose 40% Gel 15 Gram(s) Oral once PRN  glucagon  Injectable 1 milliGRAM(s) IntraMuscular once PRN      VITAL SIGNS: Last 24 Hours  T(C): 36.4 (17 Aug 2020 04:20), Max: 36.6 (16 Aug 2020 13:20)  T(F): 97.6 (17 Aug 2020 04:20), Max: 97.9 (16 Aug 2020 13:20)  HR: 76 (16 Aug 2020 13:20) (76 - 76)  BP: 126/59 (17 Aug 2020 04:20) (126/59 - 137/65)  BP(mean): --  RR: 18 (17 Aug 2020 04:20) (18 - 18)  SpO2: 95% (17 Aug 2020 05:00) (95% - 100%)    LABS:                        11.1   13.64 )-----------( 421      ( 17 Aug 2020 05:27 )             35.9     08-17    139  |  101  |  19  ----------------------------<  171<H>  4.0   |  29  |  0.7    Ca    9.6      17 Aug 2020 05:27  Mg     2.4     08-17    TPro  6.7  /  Alb  3.3<L>  /  TBili  0.3  /  DBili  x   /  AST  21  /  ALT  20  /  AlkPhos  43  08-17    PT/INR - ( 15 Aug 2020 11:44 )   PT: 11.60 sec;   INR: 1.01 ratio         PTT - ( 15 Aug 2020 11:44 )  PTT:26.7 sec    Trend Procalcitonin  08-15-20 @ 03:37   -  0.06  08-15-20 @ 03:36   -  0.05    Ferritin, Serum: 289 ng/mL (08.15.20 @ 03:37)    Lactate Dehydrogenase, Serum: 375 (08.15.20 @ 03:36)    C-Reactive Protein, Serum: 8.78 mg/dL (08.15.20 @ 03:37)      RADIOLOGY:   VA Duplex Lower Ext Vein Scan, Bilat (08.15.20 @ 11:05) >  No evidence of deep venous thrombosis or superficial thrombophlebitis in the bilateral lower extremities.    CT Angio Chest w/ IV Cont (08.15.20 @ 07:37) >    1. Multifocal bilateral patchy groundglass and consolidative airspace opacities, consistent with viral pneumonia in the appropriate clinical setting.    2. No evidence of acute pulmonary embolism.        ECHO:      PHYSICAL EXAM:  GENERAL: NAD, obese  HEENT:  Atraumatic, Normocephalic.   PULMONARY: bibasilar crackles, no wheezing  CARDIOVASCULAR: Regular rate and rhythm; No murmurs, rubs, or gallops  GASTROINTESTINAL: Soft, Nontender, Nondistended; Bowel sounds present  MUSCULOSKELETAL:  2+ Peripheral Pulses, no LE edema   NEUROLOGY: non-focal  SKIN: No rashes or lesions

## 2020-08-17 NOTE — PROGRESS NOTE ADULT - ASSESSMENT
ASSESSMENT  69y F with PMH of OA, HLD, HTN for COVID-19 pneumonia    Osteoarthritis  Dyslipidemia  Hypertension    IMPRESSION  #COVID-19 pneumonia, Severe disease requiring supplemental O2  - 8/10 COVID-19 PCR positive  - CTA Chest 8/15 with Multifocal bilateral patchy ground-glass and consolidative airspace opacities, consistent with viral pneumonia in the appropriate clinical setting. Negative for PE  - D-Dimer 8/15 3033  - Procalcitonin, Serum 8/15 0.06  - Ferritin, Serum: 289 ng/mL (08.15.20 @ 03:37)  - C-Reactive Protein, Serum: 8.78 mg/dL (08.15.20 @ 03:37)  - No trasmanitits, Cr stable    #Obesity BMI (kg/m2): 37.2, 37.2  #DM   #Abx allergy: NKDA        RECOMMENDATIONS  - continue remdesivir 100 mg daily (day 3/5 today)  - trend LFTs and Cr while on remedesevir  - on solumedrol 600 mg q 12 hours  - follow-up Covid-ab; if negative, can consider convalescent plasma if clinical picture worsening  - trend d-dimer, ferritin, procalcitonin  - follow-up blood cultures  - supplemental O2 as needed    Please call if with any questions.  Spectra [phone]    This is a preliminary incomplete pended note, all final recommendations to follow after interview and examination of the patient. ASSESSMENT  69y F with PMH of OA, HLD, HTN for COVID-19 pneumonia    Osteoarthritis  Dyslipidemia  Hypertension    IMPRESSION  #COVID-19 pneumonia, Severe disease requiring supplemental O2  - 8/10 COVID-19 PCR positive  - CTA Chest 8/15 with Multifocal bilateral patchy ground-glass and consolidative airspace opacities, consistent with viral pneumonia in the appropriate clinical setting. Negative for PE  - D-Dimer 8/15 3033  - Procalcitonin, Serum 8/15 0.06  - Ferritin, Serum: 289 ng/mL (08.15.20 @ 03:37)  - C-Reactive Protein, Serum: 8.78 mg/dL (08.15.20 @ 03:37)  - No trasmanitits, Cr stable    #Obesity BMI (kg/m2): 37.2, 37.2  #DM   #Abx allergy: NKDA        RECOMMENDATIONS  - continue remdesivir 100 mg daily (day 3/5 today)  - trend LFTs and Cr while on remedesevir  - can transition to dexamethasone 6 mg daily until discharge  - trend d-dimer, ferritin, procalcitonin  - follow-up blood cultures  - supplemental O2 as needed    Please call if with any questions.  Spectra [phone]

## 2020-08-17 NOTE — PROGRESS NOTE ADULT - ASSESSMENT
Patient is a 70yo female with PMH of hypertension, dyslipidemia, and osteoarthritis s/p L knee replacement who presented to the ED complaining of worsening shortness of breath. Patient was previously seen in the [facility] ED on 8/10/2020 and tested positive for COVID-19.    #Acute respiratory distress secondary to COVID-19 pneumonia with evolving cytokine release syndrome- improved  - Patient currently saturating 95% on room air, comfortable no respi distress  - Sepsis not present on admission (HR 83, afebrile, no leukocytosis, tachypneic)  - COVID-19 PCR 8/10/2020: positive, 8/15: Positive  - Chest X-ray 8/15/2020: worsening bilateral infiltrates  - CT angio chest 8/15/2020: Multifocal bilateral patchy ground-glass and consolidative airspace opacities, consistent with viral pneumonia in the appropriate clinical setting. No evidence of acute pulmonary embolism.  - D-dimer: 3033 (8/15)  - Procalcitonin, Serum 8/15 0.06   - LDH: 375 (elevated)  - Procalcitonin: 0.06, CRP: 8.78, Ferritin: 289, no transaminitis, or DAJUAN  - Triglycerides: 128  - No lymphopenia on admission or previous ED visit  ·	 Repeat procalcitonin and CRP Q48H  ·	 supplemental O2 prn to maintain SpO2 92-96%  ·	 ID following: currently on Remdesavir, 200 mg day 1, followed by 100 mg on day 2-4  ·	Covid Antibody pending, for possible plasma  ·	 Pulmonary embolism ruled out on admission via CT angio  ·	 c/w Lovenox 90mg SQ Q12H  ·	 c/w  methylprednisolone 60mg IV Q12H, monitor FS  ·	 c/w pantoprazole 40mg PO QD for GI prophylaxis while on steroids  ·	 Bcx: pending  ·	 trend d-dimer, ferritin, procalcitonin,  LFTs  ·	 if inflammatory markers continue to trend upward, will consider tocilizumab, but can hold off for now  ·	 will Covid-ab; if negative, can consider convalescent plasma if clinical picture worsening  ·	airborne, contact , droplet isolation precautions    #Hyperglycemia likely due to Steroids vs DM II  - f/u AIC  - Patient was on metformin 500mg PO BID earlier this year, but she claims it was for weight loss prior to her knee replacement- FS AC/HS + sliding scale    #Chest pain, likely pleuritic vs costochondritis, resolved  - Troponin negative x1, trend  - No acute ST changes on ECG  - No intervention at this time    #Hypertension  - Continue with amlodipine 5mg PO QD  - Convert home medication chlorthalidone to hydrochlorothiazide 25mg PO QD  - Monitor blood pressure    #Dyslipidemia  - Continue with atorvastatin 80mg PO QHS and omega-3 2mg PO QD    #Morbid obesity  - Counseled patient on weight loss and lifestyle modifications    #Misc  - DVT Prophylaxis: Lovenox 90mg SQ Q12H   - GI Prophylaxis: pantoprazole 40mg PO QD   - Diet: DASH/TLC  - Activity: ambulate with walker (within room)  - IV Fluids: not indicated  - Code Status: Full Code Patient is a 68yo female with PMH of hypertension, dyslipidemia, and osteoarthritis s/p L knee replacement who presented to the ED complaining of worsening shortness of breath. Patient was previously seen in the [facility] ED on 8/10/2020 and tested positive for COVID-19.    #Acute respiratory distress secondary to COVID-19 pneumonia with evolving cytokine release syndrome- improved  - Patient currently saturating 95% on room air, comfortable no respi distress  - Sepsis not present on admission (HR 83, afebrile, no leukocytosis, tachypneic)  - COVID-19 PCR 8/10/2020: positive, 8/15: Positive  - Chest X-ray 8/15/2020: worsening bilateral infiltrates  - CT angio chest 8/15/2020: Multifocal bilateral patchy ground-glass and consolidative airspace opacities, consistent with viral pneumonia in the appropriate clinical setting. No evidence of acute pulmonary embolism.  - D-dimer: 3033 (8/15)  - Procalcitonin, Serum 8/15 0.06   - LDH: 375 (elevated)  - Procalcitonin: 0.06, CRP: 8.78, Ferritin: 289, no transaminitis, or DAJUAN  - Triglycerides: 128  - No lymphopenia on admission or previous ED visit  ·	 Repeat procalcitonin and CRP Q48H  ·	 supplemental O2 prn to maintain SpO2 92-96%  ·	 ID following: currently on Remdesavir, 200 mg day 1, followed by 100 mg on day 2-4  ·	Covid Antibody pending, for possible plasma  ·	 Pulmonary embolism ruled out on admission via CT angio  ·	 decreased Lovenox 40mg SQ Q12H  ·	 changed solumedrol to decadron 6mg PO for two more days (total 5 days of steroids)  ·	 Bcx: pending  ·	 trend d-dimer, ferritin, procalcitonin,  LFTs  ·	 if inflammatory markers continue to trend upward, will consider tocilizumab, but can hold off for now  ·	 will Covid-ab; if negative, can consider convalescent plasma if clinical picture worsening  ·	airborne, contact , droplet isolation precautions    #Hyperglycemia likely due to Steroids vs DM II  - f/u AIC  - Patient was on metformin 500mg PO BID earlier this year, but she claims it was for weight loss prior to her knee replacement- FS AC/HS + sliding scale    #Chest pain, likely pleuritic vs costochondritis, resolved  - Troponin negative x1, trend  - No acute ST changes on ECG  - No intervention at this time    #Hypertension  - Continue with amlodipine 5mg PO QD  - Convert home medication chlorthalidone to hydrochlorothiazide 25mg PO QD  - Monitor blood pressure    #Dyslipidemia  - Continue with atorvastatin 80mg PO QHS and omega-3 2mg PO QD    #Morbid obesity  - Counseled patient on weight loss and lifestyle modifications    #Misc  - DVT Prophylaxis: Lovenox 90mg SQ Q12H   - GI Prophylaxis: pantoprazole 40mg PO QD   - Diet: DASH/TLC  - Activity: ambulate with walker (within room)  - IV Fluids: not indicated  - Code Status: Full Code Patient is a 68yo female with PMH of hypertension, dyslipidemia, and osteoarthritis s/p L knee replacement who presented to the ED complaining of worsening shortness of breath. Patient was previously seen in the [facility] ED on 8/10/2020 and tested positive for COVID-19.    #Acute respiratory distress secondary to COVID-19 pneumonia with evolving cytokine release syndrome- improved  - Patient currently saturating 95% on room air, comfortable no respi distress  - Sepsis not present on admission (HR 83, afebrile, no leukocytosis, tachypneic)  - COVID-19 PCR 8/10/2020: positive, 8/15: Positive  - Chest X-ray 8/15/2020: worsening bilateral infiltrates  - CT angio chest 8/15/2020: Multifocal bilateral patchy ground-glass and consolidative airspace opacities, consistent with viral pneumonia in the appropriate clinical setting. No evidence of acute pulmonary embolism.  - D-dimer: 3033 (8/15)  - Procalcitonin, Serum 8/15 0.06   - LDH: 375 (elevated)  - Procalcitonin: 0.06, CRP: 8.78, Ferritin: 289, no transaminitis, or DAJUAN  - Triglycerides: 128  - No lymphopenia on admission or previous ED visit  ·	 Repeat procalcitonin and CRP Q48H  ·	 supplemental O2 prn to maintain SpO2 92-96%  ·	 ID following: currently on Remdesavir, 200 mg day 1, followed by 100 mg on day 2-4  ·	Covid Antibody pending, for possible plasma  ·	 Pulmonary embolism ruled out on admission via CT angio  ·	 decreased Lovenox 40mg SQ Q12H  ·	 changed solumedrol to decadron 6mg PO for two more days (total 5 days of steroids)  ·	 Bcx: pending  ·	 trend d-dimer, ferritin, procalcitonin,  LFTs  ·	 if inflammatory markers continue to trend upward, will consider tocilizumab, but can hold off for now  ·	 will Covid-ab; if negative, can consider convalescent plasma if clinical picture worsening  ·	airborne, contact , droplet isolation precautions    #Hyperglycemia likely due to Steroids vs DM II  - f/u AIC  - Patient was on metformin 500mg PO BID earlier this year, but she claims it was for weight loss prior to her knee replacement- FS AC/HS + sliding scale    #Chest pain, likely pleuritic vs costochondritis, resolved  - Troponin negative x1, trend  - No acute ST changes on ECG  - No intervention at this time    #Hypertension  - Continue with amlodipine 5mg PO QD  - Convert home medication chlorthalidone to hydrochlorothiazide 25mg PO QD  - Monitor blood pressure    #Dyslipidemia  - Continue with atorvastatin 80mg PO QHS and omega-3 2mg PO QD    #Morbid obesity  - BMI 41   - Counseled patient on weight loss and lifestyle modifications    #Misc  - DVT Prophylaxis: Lovenox 90mg SQ Q12H   - GI Prophylaxis: pantoprazole 40mg PO QD   - Diet: DASH/TLC  - Activity: ambulate with walker (within room)  - IV Fluids: not indicated  - Code Status: Full Code

## 2020-08-17 NOTE — PROGRESS NOTE ADULT - SUBJECTIVE AND OBJECTIVE BOX
GRAHAM STUBBS  69y, Female  Allergy: No Known Allergies      LOS  2d    CHIEF COMPLAINT: COVID-19 pneumonia (16 Aug 2020 11:49)      INTERVAL EVENTS/HPI  - No acute events overnight  - T(F): , Max: 97.9 (20 @ 13:20)  - Denies any worsening symptoms  - Tolerating medication  - WBC Count: 13.64 (20 @ 05:27)  WBC Count: 12.20 (20 @ 06:46)     - Creatinine, Serum: 0.7 (20 @ 05:27)  Creatinine, Serum: 0.7 (20 @ 06:46)       ROS  General: Denies rigors, nightsweats  HEENT: Denies headache, rhinorrhea, sore throat, eye pain  CV: Denies CP, palpitations  PULM: Denies wheezing, hemoptysis  GI: Denies hematemesis, hematochezia, melena  : Denies discharge, hematuria  MSK: Denies arthralgias, myalgias  SKIN: Denies rash, lesions  NEURO: Denies paresthesias, weakness  PSYCH: Denies depression, anxiety    VITALS:  T(F): 97.6, Max: 97.9 (20 @ 13:20)  HR: 76  BP: 126/59  RR: 18Vital Signs Last 24 Hrs  T(C): 36.4 (17 Aug 2020 04:20), Max: 36.6 (16 Aug 2020 13:20)  T(F): 97.6 (17 Aug 2020 04:20), Max: 97.9 (16 Aug 2020 13:20)  HR: 76 (16 Aug 2020 13:20) (76 - 76)  BP: 126/59 (17 Aug 2020 04:20) (126/59 - 137/65)  BP(mean): --  RR: 18 (17 Aug 2020 04:20) (18 - 18)  SpO2: 95% (17 Aug 2020 05:00) (95% - 100%)    PHYSICAL EXAM:  Gen: NAD, resting in bed  HEENT: Normocephalic, atraumatic  Neck: supple, no lymphadenopathy  CV: Regular rate & regular rhythm  Lungs: decreased BS at bases, no fremitus  Abdomen: Soft, BS present  Ext: Warm, well perfused  Neuro: non focal, awake  Skin: no rash, no erythema  Lines: no phlebitis    FH: Non-contributory  Social Hx: Non-contributory    TESTS & MEASUREMENTS:                        11.1   13.64 )-----------( 421      ( 17 Aug 2020 05:27 )             35.9         139  |  101  |  19  ----------------------------<  171<H>  4.0   |  29  |  0.7    Ca    9.6      17 Aug 2020 05:27  Mg     2.4         TPro  6.7  /  Alb  3.3<L>  /  TBili  0.3  /  DBili  x   /  AST  21  /  ALT  20  /  AlkPhos  43      eGFR if Non African American: 89 mL/min/1.73M2 (20 @ 05:27)  eGFR if : 103 mL/min/1.73M2 (20 @ 05:27)    LIVER FUNCTIONS - ( 17 Aug 2020 05:27 )  Alb: 3.3 g/dL / Pro: 6.7 g/dL / ALK PHOS: 43 U/L / ALT: 20 U/L / AST: 21 U/L / GGT: x           D-Dimer Assay, Quantitative: 3033: Manufacturers recommended Cut off for VTE is 230 ng/ml D-DU ng/mL DDU 8/15                INFECTIOUS DISEASES TESTING  COVID-19 PCR: Detected (08-15-20 @ 07:09)  Procalcitonin, Serum: 0.06 (08-15-20 @ 03:37)  Procalcitonin, Serum: 0.05 (08-15-20 @ 03:36)  COVID-19 PCR: Detected (08-10-20 @ 19:58)      INFLAMMATORY MARKERS  C-Reactive Protein, Serum: 8.78 mg/dL (08-15-20 @ 03:37)      RADIOLOGY & ADDITIONAL TESTS:  I have personally reviewed the last available Chest xray  CXR  Xray Chest 1 View- PORTABLE-Urgent:   EXAM:  XR CHEST PORTABLE URGENT 1V            PROCEDURE DATE:  08/15/2020            INTERPRETATION:  Clinical History / Reason for exam: Shortness of breath    Comparison : Chest radiograph August 10, 2020.    Technique/Positionin frontal views.    Findings:    Support devices: None.    Cardiac/mediastinum/hilum: Heart size within normal limits, thoracic aortic calcification.    Lung parenchyma/Pleura: Bilateral opacities.    Skeleton/soft tissues: Stable.    Impression:    Bilateral opacities, worsened.                ERLINDA FUENTES M.D., ATTENDING RADIOLOGIST  This document has been electronically signed. Aug 15 2020  7:12AM             (08-15-20 @ 03:08)      CT  CT Angio Chest w/ IV Cont:   EXAM:  CT ANGIO CHEST (W)AW IC            PROCEDURE DATE:  08/15/2020            INTERPRETATION:  CLINICAL HISTORY/REASON FOR EXAM: Chest pain. Recent diagnosis of COVID-19.    TECHNIQUE: Multislice helical sections were obtained from the thoracic inlet to the lung bases during rapid administration of intravenous contrast. Thin sections were reconstructed through the pulmonary vasculature. 3D (MIP) reformats obtained.    COMPARISON: None.    FINDINGS:    PULMONARY EMBOLUS: No evidence of acute pulmonary embolism.    LUNGS, PLEURA, AIRWAYS: Multifocal bilateral patchy groundglass and consolidative airspace opacities. No lobar mass, effusion, or pneumothorax. No evidence of central endobronchial obstruction. No bronchiectasis or honeycombing.    THORACIC NODES: Nonspecific prominent 1.6 cm short axis subcarinal lymph node, likely reactive.    MEDIASTINUM/GREAT VESSELS: No pericardial effusion. Heart size is within normal limits. The aorta and main pulmonary artery are of normal caliber. Small hiatal hernia.    BONES/SOFT TISSUES: Degenerative change, thoracic spine.    VISUALIZED UPPER ABDOMEN: Right renal cyst. Pancreatic body 0.9 cm lipoma.      IMPRESSION:    1. Multifocal bilateral patchy groundglass and consolidative airspace opacities, consistent with viral pneumonia in the appropriate clinical setting.    2. No evidence of acute pulmonary embolism.                    BRISEYDA ALEXIS M.D., ATTENDING RADIOLOGIST  This document has been electronically signed. Aug 15 2020  7:38AM            (08-15-20 @ 07:37)      CARDIOLOGY TESTING  12 Lead ECG:   Ventricular Rate 81 BPM    Atrial Rate 81 BPM    P-R Interval 172 ms    QRS Duration 80 ms    Q-T Interval 394 ms    QTC Calculation(Bezet) 457 ms    P Axis 27 degrees    R Axis -12 degrees    T Axis 3 degrees    Diagnosis Line Normal sinus rhythm with sinus arrhythmia  Low voltage QRS  Cannot rule out Anterior infarct , age undetermined  Abnormal ECG    Confirmed by MANUEL BLAS MD ([id number]) on 8/15/2020 9:01:27 AM (08-15-20 @ 02:53)  12 Lead ECG:   Ventricular Rate 82 BPM    Atrial Rate 82 BPM    P-R Interval 176 ms    QRS Duration 82 ms    Q-T Interval 438 ms    QTC Calculation(Bezet) 511 ms    P Axis 29 degrees    R Axis -2 degrees    T Axis 77 degrees    Diagnosis Line Sinus rhythm with Fusion complexes  Low voltage QRS  Nonspecific T wave abnormality  Abnormal ECG    Confirmed by ARIA DAMICO MD ([id number]) on 8/10/2020 3:18:12 PM (08-10-20 @ 13:48)      MEDICATIONS  amLODIPine   Tablet 5 Oral daily  aspirin enteric coated 81 Oral daily  atorvastatin 80 Oral at bedtime  bisacodyl 5 Oral at bedtime  chlorhexidine 4% Liquid 1 Topical <User Schedule>  dextrose 5%. 1000 IV Continuous <Continuous>  dextrose 50% Injectable 12.5 IV Push once  dextrose 50% Injectable 25 IV Push once  dextrose 50% Injectable 25 IV Push once  enoxaparin Injectable 90 SubCutaneous every 12 hours  hydrochlorothiazide 25 Oral daily  insulin lispro (HumaLOG) corrective regimen sliding scale  SubCutaneous three times a day before meals  methylPREDNISolone sodium succinate Injectable 60 IV Push every 12 hours  omega-3-Acid Ethyl Esters 2 Oral daily  pantoprazole    Tablet 40 Oral before breakfast  remdesivir  IVPB  IV Intermittent   remdesivir  IVPB 100 IV Intermittent every 24 hours  senna 2 Oral at bedtime      WEIGHT  Weight (kg): 96 (08-15-20 @ 19:00)  Creatinine, Serum: 0.7 mg/dL (20 @ 05:27)      ANTIBIOTICS:  remdesivir  IVPB   IV Intermittent   remdesivir  IVPB 100 milliGRAM(s) IV Intermittent every 24 hours      All available historical records have been reviewed

## 2020-08-18 LAB
ALBUMIN SERPL ELPH-MCNC: 3.1 G/DL — LOW (ref 3.5–5.2)
ALBUMIN SERPL ELPH-MCNC: 3.1 G/DL — LOW (ref 3.5–5.2)
ALP SERPL-CCNC: 41 U/L — SIGNIFICANT CHANGE UP (ref 30–115)
ALP SERPL-CCNC: 41 U/L — SIGNIFICANT CHANGE UP (ref 30–115)
ALT FLD-CCNC: 28 U/L — SIGNIFICANT CHANGE UP (ref 0–41)
ALT FLD-CCNC: 29 U/L — SIGNIFICANT CHANGE UP (ref 0–41)
ANION GAP SERPL CALC-SCNC: 9 MMOL/L — SIGNIFICANT CHANGE UP (ref 7–14)
AST SERPL-CCNC: 36 U/L — SIGNIFICANT CHANGE UP (ref 0–41)
AST SERPL-CCNC: 36 U/L — SIGNIFICANT CHANGE UP (ref 0–41)
AT III ACT/NOR PPP CHRO: 100 % — SIGNIFICANT CHANGE UP (ref 85–135)
BASOPHILS # BLD AUTO: 0.03 K/UL — SIGNIFICANT CHANGE UP (ref 0–0.2)
BASOPHILS NFR BLD AUTO: 0.3 % — SIGNIFICANT CHANGE UP (ref 0–1)
BILIRUB DIRECT SERPL-MCNC: <0.2 MG/DL — SIGNIFICANT CHANGE UP (ref 0–0.2)
BILIRUB INDIRECT FLD-MCNC: SIGNIFICANT CHANGE UP MG/DL (ref 0.2–1.2)
BILIRUB SERPL-MCNC: <0.2 MG/DL — SIGNIFICANT CHANGE UP (ref 0.2–1.2)
BILIRUB SERPL-MCNC: <0.2 MG/DL — SIGNIFICANT CHANGE UP (ref 0.2–1.2)
BUN SERPL-MCNC: 19 MG/DL — SIGNIFICANT CHANGE UP (ref 10–20)
CALCIUM SERPL-MCNC: 9.3 MG/DL — SIGNIFICANT CHANGE UP (ref 8.5–10.1)
CHLORIDE SERPL-SCNC: 102 MMOL/L — SIGNIFICANT CHANGE UP (ref 98–110)
CO2 SERPL-SCNC: 30 MMOL/L — SIGNIFICANT CHANGE UP (ref 17–32)
CREAT SERPL-MCNC: 0.7 MG/DL — SIGNIFICANT CHANGE UP (ref 0.7–1.5)
CREAT SERPL-MCNC: 0.8 MG/DL — SIGNIFICANT CHANGE UP (ref 0.7–1.5)
CRP SERPL-MCNC: 2.83 MG/DL — HIGH (ref 0–0.4)
D DIMER BLD IA.RAPID-MCNC: 647 NG/ML DDU — HIGH (ref 0–230)
EOSINOPHIL # BLD AUTO: 0.03 K/UL — SIGNIFICANT CHANGE UP (ref 0–0.7)
EOSINOPHIL NFR BLD AUTO: 0.3 % — SIGNIFICANT CHANGE UP (ref 0–8)
FERRITIN SERPL-MCNC: 171 NG/ML — HIGH (ref 15–150)
GLUCOSE BLDC GLUCOMTR-MCNC: 135 MG/DL — HIGH (ref 70–99)
GLUCOSE BLDC GLUCOMTR-MCNC: 149 MG/DL — HIGH (ref 70–99)
GLUCOSE BLDC GLUCOMTR-MCNC: 158 MG/DL — HIGH (ref 70–99)
GLUCOSE BLDC GLUCOMTR-MCNC: 161 MG/DL — HIGH (ref 70–99)
GLUCOSE BLDC GLUCOMTR-MCNC: 194 MG/DL — HIGH (ref 70–99)
GLUCOSE SERPL-MCNC: 135 MG/DL — HIGH (ref 70–99)
HCT VFR BLD CALC: 36.8 % — LOW (ref 37–47)
HGB BLD-MCNC: 11.5 G/DL — LOW (ref 12–16)
IMM GRANULOCYTES NFR BLD AUTO: 1.5 % — HIGH (ref 0.1–0.3)
LYMPHOCYTES # BLD AUTO: 3.49 K/UL — HIGH (ref 1.2–3.4)
LYMPHOCYTES # BLD AUTO: 31.5 % — SIGNIFICANT CHANGE UP (ref 20.5–51.1)
MAGNESIUM SERPL-MCNC: 2 MG/DL — SIGNIFICANT CHANGE UP (ref 1.8–2.4)
MCHC RBC-ENTMCNC: 27.6 PG — SIGNIFICANT CHANGE UP (ref 27–31)
MCHC RBC-ENTMCNC: 31.3 G/DL — LOW (ref 32–37)
MCV RBC AUTO: 88.2 FL — SIGNIFICANT CHANGE UP (ref 81–99)
MONOCYTES # BLD AUTO: 1.04 K/UL — HIGH (ref 0.1–0.6)
MONOCYTES NFR BLD AUTO: 9.4 % — HIGH (ref 1.7–9.3)
MRSA PCR RESULT.: NEGATIVE — SIGNIFICANT CHANGE UP
NEUTROPHILS # BLD AUTO: 6.32 K/UL — SIGNIFICANT CHANGE UP (ref 1.4–6.5)
NEUTROPHILS NFR BLD AUTO: 57 % — SIGNIFICANT CHANGE UP (ref 42.2–75.2)
NRBC # BLD: 0 /100 WBCS — SIGNIFICANT CHANGE UP (ref 0–0)
PLATELET # BLD AUTO: 429 K/UL — HIGH (ref 130–400)
POTASSIUM SERPL-MCNC: 3.7 MMOL/L — SIGNIFICANT CHANGE UP (ref 3.5–5)
POTASSIUM SERPL-SCNC: 3.7 MMOL/L — SIGNIFICANT CHANGE UP (ref 3.5–5)
PROCALCITONIN SERPL-MCNC: 0.03 NG/ML — SIGNIFICANT CHANGE UP (ref 0.02–0.1)
PROT S FREE AG PPP IA-ACNC: 34 % — LOW (ref 61–131)
PROT SERPL-MCNC: 6.3 G/DL — SIGNIFICANT CHANGE UP (ref 6–8)
PROT SERPL-MCNC: 6.3 G/DL — SIGNIFICANT CHANGE UP (ref 6–8)
RBC # BLD: 4.17 M/UL — LOW (ref 4.2–5.4)
RBC # FLD: 14.6 % — HIGH (ref 11.5–14.5)
SARS-COV-2 IGG SERPL IA-ACNC: 8.63 RATIO — HIGH
SARS-COV-2 IGG SERPL QL IA: POSITIVE
SARS-COV-2 IGM SERPL IA-ACNC: 6.12 INDEX — HIGH
SARS-COV-2 IGM SERPL IA-ACNC: 8.01 RATIO — HIGH
SODIUM SERPL-SCNC: 141 MMOL/L — SIGNIFICANT CHANGE UP (ref 135–146)
WBC # BLD: 11.08 K/UL — HIGH (ref 4.8–10.8)
WBC # FLD AUTO: 11.08 K/UL — HIGH (ref 4.8–10.8)

## 2020-08-18 PROCEDURE — 99232 SBSQ HOSP IP/OBS MODERATE 35: CPT | Mod: CS

## 2020-08-18 RX ADMIN — AMLODIPINE BESYLATE 5 MILLIGRAM(S): 2.5 TABLET ORAL at 05:48

## 2020-08-18 RX ADMIN — REMDESIVIR 500 MILLIGRAM(S): 5 INJECTION INTRAVENOUS at 15:50

## 2020-08-18 RX ADMIN — PANTOPRAZOLE SODIUM 40 MILLIGRAM(S): 20 TABLET, DELAYED RELEASE ORAL at 05:48

## 2020-08-18 RX ADMIN — Medication 25 MILLIGRAM(S): at 05:48

## 2020-08-18 RX ADMIN — Medication 6 MILLIGRAM(S): at 05:48

## 2020-08-18 RX ADMIN — Medication 81 MILLIGRAM(S): at 11:55

## 2020-08-18 RX ADMIN — ATORVASTATIN CALCIUM 80 MILLIGRAM(S): 80 TABLET, FILM COATED ORAL at 21:26

## 2020-08-18 RX ADMIN — Medication 1: at 16:51

## 2020-08-18 RX ADMIN — Medication 1: at 11:55

## 2020-08-18 RX ADMIN — Medication 2 GRAM(S): at 11:55

## 2020-08-18 NOTE — PROGRESS NOTE ADULT - ATTENDING COMMENTS
Patient seen and examined. Feels well. Cough is mild, SOB has resolved. She is ambulating without distress. On day 4 of Remdesivir, final dose tomorrow. C/w PO decadron. No need for tocilizumab currently. F/u Hep C viral load. Likely dc in AM. Patient in agreement with plan of care.
Patient seen and examined. Patient feels better. SOB is improving. On ambient air.   Trend inflammatory markers: CRP, ferritin. Patient on full dose lovenox for elevated d dimer, obesity. Patient enrolled in investigational study.   Switch to PO decadron, dc solumedrol. C/w Remdesivir. ID following.   Agree with resident's note above.     #Progress Note Handoff  Pending (specify): Remdesivir, monitor for cytokine release syndrome   Family discussion: steven pt plan as above   Disposition: Home

## 2020-08-18 NOTE — PROGRESS NOTE ADULT - ASSESSMENT
Patient is a 68yo female with PMH of hypertension, dyslipidemia, and osteoarthritis s/p L knee replacement who presented to the ED complaining of worsening shortness of breath. Patient was previously seen in the [facility] ED on 8/10/2020 and tested positive for COVID-19.    #Acute respiratory distress secondary to COVID-19 pneumonia with evolving cytokine release syndrome- improved  - Patient currently saturating 95% on room air, comfortable no respi distress  - Sepsis ruled out on admission  - COVID-19 PCR 8/10/2020: positive, 8/15: Positive  - Chest X-ray 8/15/2020: worsening bilateral infiltrates  - CT angio chest 8/15/2020: Multifocal bilateral patchy ground-glass and consolidative airspace opacities, consistent with viral pneumonia in the appropriate clinical setting. No evidence of acute pulmonary embolism.  - D-dimer: 3033 (8/15)>> 647 (8/17)  - Procalcitonin, Serum 8/15 0.06 >>0.05> 8/17: 0.04   - LDH: 375 (elevated)  -  CRP: 8.78>>2.83 (8/17) , Ferritin: 289, no transaminitis, or DAJUAN  - Triglycerides: 128  - No lymphopenia on admission or previous ED visit  ·	 Repeat procalcitonin and CRP Q48H  ·	 supplemental O2 prn to maintain SpO2 92-96%  ·	 ID following: currently on Remdesavir, 200 mg day 1, followed by 100 mg on day 2-4  ·	Covid Antibody pending, for possible plasma  ·	Pulmonary embolism ruled out on admission via CT angio  ·	decreased Lovenox 40mg SQ Q12H  ·	 changed solumedrol to decadron 6mg PO for two more days (total 5 days of steroids)  ·	 Bcx: pending  ·	 trend d-dimer, ferritin, procalcitonin,  LFTs  ·	 if inflammatory markers continue to trend upward, will consider tocilizumab, but can hold off for now  ·	will Covid-ab; if negative, can consider convalescent plasma if clinical picture worsening  ·	airborne, contact , droplet isolation precautions      #Hepatitis C  - detected, viral load pending  - never treated before  - needs outpt hepatology  follow up and appropriate vaccination     # DM II with hyperglycemia - on steroids   - AIC- 6.8  - Patient was on metformin 500mg PO BID earlier this year, but she claims it was for weight loss prior to her knee replacement- FS AC/HS + sliding scale    #Chest pain, likely pleuritic vs costochondritis, resolved  - Troponin negative x1, trend  - No acute ST changes on ECG  - No intervention at this time    #Constipation  - on bowel regimen; had two loose BM today    #Hypertension  - Continue with amlodipine 5mg PO QD  - Convert home medication chlorthalidone to hydrochlorothiazide 25mg PO QD  - Monitor blood pressure    #Dyslipidemia  - Continue with atorvastatin 80mg PO QHS and omega-3 2mg PO QD    #Morbid obesity  - BMI 41   - Counseled patient on weight loss and lifestyle modifications    #Misc  - DVT Prophylaxis: Lovenox 40mg SQ Q12H   - GI Prophylaxis: pantoprazole 40mg PO QD   - Diet: DASH/TLC  - Activity: ambulate with walker (within room)  - IV Fluids: not indicated Code Status: Full Code

## 2020-08-18 NOTE — PROGRESS NOTE ADULT - SUBJECTIVE AND OBJECTIVE BOX
GRAHAM STUBBS  69y, Female    All available historical data reviewed    OVERNIGHT EVENTS:  no fevers  feels well and has no complaints   97% RA    ROS:  General: Denies rigors, nightsweats  HEENT: Denies headache, rhinorrhea, sore throat, eye pain  CV: Denies CP, palpitations  PULM: Denies wheezing, hemoptysis  GI: Denies hematemesis, hematochezia, melena  : Denies discharge, hematuria  MSK: Denies arthralgias, myalgias  SKIN: Denies rash, lesions  NEURO: Denies paresthesias, weakness  PSYCH: Denies depression, anxiety    VITALS:  T(F): 96.9, Max: 97.2 (08-17-20 @ 16:31)  HR: 65  BP: 119/56  RR: 17Vital Signs Last 24 Hrs  T(C): 36.1 (18 Aug 2020 05:59), Max: 36.2 (17 Aug 2020 16:31)  T(F): 96.9 (18 Aug 2020 05:59), Max: 97.2 (17 Aug 2020 16:31)  HR: 65 (18 Aug 2020 05:59) (65 - 68)  BP: 119/56 (18 Aug 2020 05:59) (119/56 - 135/66)  BP(mean): --  RR: 17 (18 Aug 2020 05:59) (16 - 19)  SpO2: 98% (18 Aug 2020 05:59) (96% - 98%)    TESTS & MEASUREMENTS:                        11.5   11.08 )-----------( 429      ( 18 Aug 2020 07:04 )             36.8     08-18    141  |  102  |  19  ----------------------------<  135<H>  3.7   |  30  |  0.7    Ca    9.3      18 Aug 2020 07:04  Mg     2.0     08-18    TPro  6.3  /  Alb  3.1<L>  /  TBili  <0.2  /  DBili  <0.2  /  AST  36  /  ALT  28  /  AlkPhos  41  08-18    LIVER FUNCTIONS - ( 18 Aug 2020 07:04 )  Alb: 3.1 g/dL / Pro: 6.3 g/dL / ALK PHOS: 41 U/L / ALT: 28 U/L / AST: 36 U/L / GGT: x                   RADIOLOGY & ADDITIONAL TESTS:  Personal review of radiological diagnostics performed  Echo and EKG results noted when applicable.     MEDICATIONS:  amLODIPine   Tablet 5 milliGRAM(s) Oral daily  aspirin enteric coated 81 milliGRAM(s) Oral daily  atorvastatin 80 milliGRAM(s) Oral at bedtime  bisacodyl 5 milliGRAM(s) Oral at bedtime  chlorhexidine 4% Liquid 1 Application(s) Topical <User Schedule>  dexAMETHasone     Tablet 6 milliGRAM(s) Oral daily  dextrose 40% Gel 15 Gram(s) Oral once PRN  dextrose 5%. 1000 milliLiter(s) IV Continuous <Continuous>  dextrose 50% Injectable 12.5 Gram(s) IV Push once  dextrose 50% Injectable 25 Gram(s) IV Push once  dextrose 50% Injectable 25 Gram(s) IV Push once  enoxaparin Study Injectable () 1 Dose(s) SubCutaneous two times a day  glucagon  Injectable 1 milliGRAM(s) IntraMuscular once PRN  hydrochlorothiazide 25 milliGRAM(s) Oral daily  insulin lispro (HumaLOG) corrective regimen sliding scale   SubCutaneous three times a day before meals  omega-3-Acid Ethyl Esters 2 Gram(s) Oral daily  pantoprazole    Tablet 40 milliGRAM(s) Oral before breakfast  remdesivir  IVPB   IV Intermittent   remdesivir  IVPB 100 milliGRAM(s) IV Intermittent every 24 hours  senna 2 Tablet(s) Oral at bedtime      ANTIBIOTICS:  remdesivir  IVPB   IV Intermittent   remdesivir  IVPB 100 milliGRAM(s) IV Intermittent every 24 hours

## 2020-08-18 NOTE — RESEARCH COMMUNICATION NOTE - NS AS RESEARCH STUDY NAME FT
Systemic Anticoagulation with Full Dose Low Molecular Weight Heparin (LMWH) Vs. Prophylactic or Intermediate Dose LMWH in High Risk COVID-19 Patients (HEP-COVID Trial)  IND Exempt: PIND #[id number]  : Yasmeen Whitfield MD

## 2020-08-18 NOTE — PROGRESS NOTE ADULT - SUBJECTIVE AND OBJECTIVE BOX
GRAHAM STUBBS 69y Female  MRN#: [id number]   CODE STATUS: FULL      SUBJECTIVE  Patient is a 69y old Female who presents with a chief complaint of COVID-19 pneumonia (18 Aug 2020 08:17)    Currently admitted to medicine with the primary diagnosis of  Acute hypoxic respiratory failure sec to COVID 19 pneumonia - improving    Today is hospital day 3d,   INTERVAL HPI/ OVERNIGHT EVENTS: none. Patient sitting in chair, comfortable , had loose stools (2) as she is on laxatives.     Present Today:           Orr Catheter (x)No/ ()Yes?   Indication:             Central Line (x)No/ ()Yes?   Indication:          IV Fluids (x)No/ ()Yes? Type:  Rate:  Indication:    OBJECTIVE  PAST MEDICAL & SURGICAL HISTORY  Osteoarthritis  Dyslipidemia  Hypertension  History of tonsillectomy  History of left knee replacement: 2/2020    ALLERGIES:  No Known Allergies    HOME MEDICATIONS:  Home Medications:  amLODIPine 5 mg oral tablet: 1 tab(s) orally once a day (15 Aug 2020 10:09)  aspirin 81 mg oral tablet: 1 tab(s) orally once a day (15 Aug 2020 10:09)  atorvastatin 80 mg oral tablet: 1 tab(s) orally once a day (15 Aug 2020 10:09)  chlorthalidone 25 mg oral tablet: 1 tab(s) orally once a day (15 Aug 2020 10:09)  Omega-3 oral capsule: 1 milligram(s) orally once a day (15 Aug 2020 10:09)    MEDICATIONS:  STANDING MEDICATIONS  amLODIPine   Tablet 5 milliGRAM(s) Oral daily  aspirin enteric coated 81 milliGRAM(s) Oral daily  atorvastatin 80 milliGRAM(s) Oral at bedtime  bisacodyl 5 milliGRAM(s) Oral at bedtime  chlorhexidine 4% Liquid 1 Application(s) Topical <User Schedule>  dexAMETHasone     Tablet 6 milliGRAM(s) Oral daily  dextrose 5%. 1000 milliLiter(s) (50 mL/Hr) IV Continuous <Continuous>  dextrose 50% Injectable 12.5 Gram(s) IV Push once  dextrose 50% Injectable 25 Gram(s) IV Push once  dextrose 50% Injectable 25 Gram(s) IV Push once  enoxaparin Study Injectable () 1 Dose(s) SubCutaneous two times a day  hydrochlorothiazide 25 milliGRAM(s) Oral daily  insulin lispro (HumaLOG) corrective regimen sliding scale   SubCutaneous three times a day before meals  omega-3-Acid Ethyl Esters 2 Gram(s) Oral daily  pantoprazole    Tablet 40 milliGRAM(s) Oral before breakfast  remdesivir  IVPB   IV Intermittent   remdesivir  IVPB 100 milliGRAM(s) IV Intermittent every 24 hours  senna 2 Tablet(s) Oral at bedtime    PRN MEDICATIONS  dextrose 40% Gel 15 Gram(s) Oral once PRN  glucagon  Injectable 1 milliGRAM(s) IntraMuscular once PRN      VITAL SIGNS: Last 24 Hours  T(C): 36.1 (18 Aug 2020 05:59), Max: 36.2 (17 Aug 2020 16:31)  T(F): 96.9 (18 Aug 2020 05:59), Max: 97.2 (17 Aug 2020 16:31)  HR: 65 (18 Aug 2020 05:59) (65 - 68)  BP: 119/56 (18 Aug 2020 05:59) (119/56 - 135/66)  RR: 17 (18 Aug 2020 05:59) (16 - 19)  SpO2: 96% (18 Aug 2020 09:16) (96% - 98%)    LABS:                        11.5   11.08 )-----------( 429      ( 18 Aug 2020 07:04 )             36.8     08-18    141  |  102  |  19  ----------------------------<  135<H>  3.7   |  30  |  0.7    Ca    9.3      18 Aug 2020 07:04  Mg     2.0     08-18    TPro  6.3  /  Alb  3.1<L>  /  TBili  <0.2  /  DBili  <0.2  /  AST  36  /  ALT  28  /  AlkPhos  41  08-18    PT/INR - ( 17 Aug 2020 17:28 )   PT: 12.00 sec;   INR: 1.04 ratio      Ferritin, Serum: 289 ng/mL (08.15.20 @ 03:37)    Lactate Dehydrogenase, Serum: 375 (08.15.20 @ 03:36)    C-Reactive Protein, Serum: 8.78 mg/dL (08.15.20 @ 03:37)    Trend Procalcitonin  08-17-20 @ 05:27   -  0.04  08-15-20 @ 03:37   -  0.06  08-15-20 @ 03:36   -  0.05    D-Dimer Assay, Quantitative: 647: (08.18.20 @ 07:04)<< 3033: (08.15.20 @ 03:36)    RADIOLOGY:  VA Duplex Lower Ext Vein Scan, Bilat (08.15.20 @ 11:05) >  No evidence of deep venous thrombosis or superficial thrombophlebitis in the bilateral lower extremities.    ECHO:      PHYSICAL EXAM:    GENERAL: NAD, obese  HEENT:  Atraumatic, Normocephalic.   PULMONARY: bibasilar crackles, no wheezing  CARDIOVASCULAR: Regular rate and rhythm; No murmurs, rubs, or gallops  GASTROINTESTINAL: Soft, Nontender, Nondistended; Bowel sounds present  MUSCULOSKELETAL:  2+ Peripheral Pulses, no LE edema   NEUROLOGY: non-focal  SKIN: No rashes or lesions

## 2020-08-18 NOTE — RESEARCH COMMUNICATION NOTE - NS AS RESEARCH COMMUNICATION NOTE FT
Patient is being followed by Dr. Whitfield and research team as part of HEP-COVID trial. Subject has been assessed for research related adverse events; none noted. If there are any questions please contact research team at [phone].

## 2020-08-18 NOTE — PROGRESS NOTE ADULT - ASSESSMENT
ASSESSMENT  69y F with PMH of OA, HLD, HTN for COVID-19 pneumonia    Osteoarthritis  Dyslipidemia  Hypertension    IMPRESSION  #COVID-19 pneumonia, CT > 50 % of lung involvement  - 8/10 COVID-19 PCR positive  - CTA Chest 8/15 with Multifocal bilateral patchy ground-glass and consolidative airspace opacities, consistent with viral pneumonia in the appropriate clinical setting. Negative for PE  - D-Dimer 8/15 3033  - Procalcitonin, Serum 8/15 0.06  - Ferritin, Serum: 289 ng/mL (08.15.20 @ 03:37)  - C-Reactive Protein, Serum: 8.78 mg/dL (08.15.20 @ 03:37)  - No trasmanitits, Cr stable    #Obesity BMI (kg/m2): 37.2, 37.2  #DM   #Abx allergy: NKDA        RECOMMENDATIONS  - continue remdesivir 100 mg daily (day 3/5 today)  - trend LFTs and Cr while on remedesevir  - can transition to dexamethasone 6 mg daily until discharge  - trend d-dimer, ferritin, procalcitonin  - follow-up blood cultures  - supplemental O2 as needed    Please call if with any questions.  Spectra [phone]

## 2020-08-19 ENCOUNTER — TRANSCRIPTION ENCOUNTER (OUTPATIENT)
Age: 70
End: 2020-08-19

## 2020-08-19 VITALS
SYSTOLIC BLOOD PRESSURE: 138 MMHG | HEART RATE: 78 BPM | TEMPERATURE: 97 F | RESPIRATION RATE: 18 BRPM | DIASTOLIC BLOOD PRESSURE: 61 MMHG | OXYGEN SATURATION: 98 %

## 2020-08-19 LAB
ALBUMIN SERPL ELPH-MCNC: 3.1 G/DL — LOW (ref 3.5–5.2)
ALBUMIN SERPL ELPH-MCNC: 3.6 G/DL — SIGNIFICANT CHANGE UP (ref 3.5–5.2)
ALP SERPL-CCNC: 42 U/L — SIGNIFICANT CHANGE UP (ref 30–115)
ALP SERPL-CCNC: 50 U/L — SIGNIFICANT CHANGE UP (ref 30–115)
ALT FLD-CCNC: 40 U/L — SIGNIFICANT CHANGE UP (ref 0–41)
ALT FLD-CCNC: 43 U/L — HIGH (ref 0–41)
ANION GAP SERPL CALC-SCNC: 11 MMOL/L — SIGNIFICANT CHANGE UP (ref 7–14)
AST SERPL-CCNC: 46 U/L — HIGH (ref 0–41)
AST SERPL-CCNC: 49 U/L — HIGH (ref 0–41)
BASOPHILS # BLD AUTO: 0 K/UL — SIGNIFICANT CHANGE UP (ref 0–0.2)
BASOPHILS NFR BLD AUTO: 0 % — SIGNIFICANT CHANGE UP (ref 0–1)
BILIRUB DIRECT SERPL-MCNC: <0.2 MG/DL — SIGNIFICANT CHANGE UP (ref 0–0.2)
BILIRUB INDIRECT FLD-MCNC: SIGNIFICANT CHANGE UP MG/DL (ref 0.2–1.2)
BILIRUB SERPL-MCNC: 0.2 MG/DL — SIGNIFICANT CHANGE UP (ref 0.2–1.2)
BILIRUB SERPL-MCNC: <0.2 MG/DL — SIGNIFICANT CHANGE UP (ref 0.2–1.2)
BUN SERPL-MCNC: 17 MG/DL — SIGNIFICANT CHANGE UP (ref 10–20)
CALCIUM SERPL-MCNC: 9.4 MG/DL — SIGNIFICANT CHANGE UP (ref 8.5–10.1)
CHLORIDE SERPL-SCNC: 100 MMOL/L — SIGNIFICANT CHANGE UP (ref 98–110)
CO2 SERPL-SCNC: 27 MMOL/L — SIGNIFICANT CHANGE UP (ref 17–32)
CREAT SERPL-MCNC: 0.7 MG/DL — SIGNIFICANT CHANGE UP (ref 0.7–1.5)
CREAT SERPL-MCNC: 0.8 MG/DL — SIGNIFICANT CHANGE UP (ref 0.7–1.5)
D DIMER BLD IA.RAPID-MCNC: 398 NG/ML DDU — HIGH (ref 0–230)
EOSINOPHIL # BLD AUTO: 0.1 K/UL — SIGNIFICANT CHANGE UP (ref 0–0.7)
EOSINOPHIL NFR BLD AUTO: 0.9 % — SIGNIFICANT CHANGE UP (ref 0–8)
FIBRINOGEN PPP-MCNC: 449 MG/DL — SIGNIFICANT CHANGE UP (ref 204.4–570.6)
GIANT PLATELETS BLD QL SMEAR: PRESENT — SIGNIFICANT CHANGE UP
GLUCOSE BLDC GLUCOMTR-MCNC: 137 MG/DL — HIGH (ref 70–99)
GLUCOSE BLDC GLUCOMTR-MCNC: 188 MG/DL — HIGH (ref 70–99)
GLUCOSE SERPL-MCNC: 119 MG/DL — HIGH (ref 70–99)
HCT VFR BLD CALC: 36.7 % — LOW (ref 37–47)
HGB BLD-MCNC: 11.8 G/DL — LOW (ref 12–16)
INR BLD: 1 RATIO — SIGNIFICANT CHANGE UP (ref 0.65–1.3)
LYMPHOCYTES # BLD AUTO: 2.25 K/UL — SIGNIFICANT CHANGE UP (ref 1.2–3.4)
LYMPHOCYTES # BLD AUTO: 20.2 % — LOW (ref 20.5–51.1)
MAGNESIUM SERPL-MCNC: 1.9 MG/DL — SIGNIFICANT CHANGE UP (ref 1.8–2.4)
MANUAL SMEAR VERIFICATION: SIGNIFICANT CHANGE UP
MCHC RBC-ENTMCNC: 27.4 PG — SIGNIFICANT CHANGE UP (ref 27–31)
MCHC RBC-ENTMCNC: 32.2 G/DL — SIGNIFICANT CHANGE UP (ref 32–37)
MCV RBC AUTO: 85.3 FL — SIGNIFICANT CHANGE UP (ref 81–99)
METAMYELOCYTES # FLD: 2.6 % — HIGH (ref 0–0)
MONOCYTES # BLD AUTO: 0.98 K/UL — HIGH (ref 0.1–0.6)
MONOCYTES NFR BLD AUTO: 8.8 % — SIGNIFICANT CHANGE UP (ref 1.7–9.3)
MYELOCYTES NFR BLD: 1.8 % — HIGH (ref 0–0)
NEUTROPHILS # BLD AUTO: 7.14 K/UL — HIGH (ref 1.4–6.5)
NEUTROPHILS NFR BLD AUTO: 64 % — SIGNIFICANT CHANGE UP (ref 42.2–75.2)
PLAT MORPH BLD: NORMAL — SIGNIFICANT CHANGE UP
PLATELET # BLD AUTO: 474 K/UL — HIGH (ref 130–400)
POTASSIUM SERPL-MCNC: 3.9 MMOL/L — SIGNIFICANT CHANGE UP (ref 3.5–5)
POTASSIUM SERPL-SCNC: 3.9 MMOL/L — SIGNIFICANT CHANGE UP (ref 3.5–5)
PROT SERPL-MCNC: 6.3 G/DL — SIGNIFICANT CHANGE UP (ref 6–8)
PROT SERPL-MCNC: 7 G/DL — SIGNIFICANT CHANGE UP (ref 6–8)
PROTHROM AB SERPL-ACNC: 11.5 SEC — SIGNIFICANT CHANGE UP (ref 9.95–12.87)
RBC # BLD: 4.3 M/UL — SIGNIFICANT CHANGE UP (ref 4.2–5.4)
RBC # FLD: 14.5 % — SIGNIFICANT CHANGE UP (ref 11.5–14.5)
RBC BLD AUTO: NORMAL — SIGNIFICANT CHANGE UP
SMUDGE CELLS # BLD: PRESENT — SIGNIFICANT CHANGE UP
SODIUM SERPL-SCNC: 138 MMOL/L — SIGNIFICANT CHANGE UP (ref 135–146)
TROPONIN T SERPL-MCNC: <0.01 NG/ML — SIGNIFICANT CHANGE UP
VARIANT LYMPHS # BLD: 1.7 % — SIGNIFICANT CHANGE UP (ref 0–5)
WBC # BLD: 11.16 K/UL — HIGH (ref 4.8–10.8)
WBC # FLD AUTO: 11.16 K/UL — HIGH (ref 4.8–10.8)

## 2020-08-19 PROCEDURE — 99239 HOSP IP/OBS DSCHRG MGMT >30: CPT | Mod: CS

## 2020-08-19 PROCEDURE — 93970 EXTREMITY STUDY: CPT | Mod: 26,CS

## 2020-08-19 RX ORDER — ATORVASTATIN CALCIUM 80 MG/1
1 TABLET, FILM COATED ORAL
Qty: 0 | Refills: 0 | DISCHARGE

## 2020-08-19 RX ORDER — ASPIRIN/CALCIUM CARB/MAGNESIUM 324 MG
1 TABLET ORAL
Qty: 0 | Refills: 0 | DISCHARGE

## 2020-08-19 RX ORDER — OMEGA-3 ACID ETHYL ESTERS 1 G
2 CAPSULE ORAL
Qty: 0 | Refills: 0 | DISCHARGE
Start: 2020-08-19

## 2020-08-19 RX ORDER — ASPIRIN/CALCIUM CARB/MAGNESIUM 324 MG
1 TABLET ORAL
Qty: 0 | Refills: 0 | DISCHARGE
Start: 2020-08-19

## 2020-08-19 RX ORDER — OMEGA-3 ACID ETHYL ESTERS 1 G
1 CAPSULE ORAL
Qty: 0 | Refills: 0 | DISCHARGE

## 2020-08-19 RX ORDER — AMLODIPINE BESYLATE 2.5 MG/1
1 TABLET ORAL
Qty: 0 | Refills: 0 | DISCHARGE
Start: 2020-08-19

## 2020-08-19 RX ORDER — ATORVASTATIN CALCIUM 80 MG/1
1 TABLET, FILM COATED ORAL
Qty: 0 | Refills: 0 | DISCHARGE
Start: 2020-08-19

## 2020-08-19 RX ORDER — AMLODIPINE BESYLATE 2.5 MG/1
1 TABLET ORAL
Qty: 0 | Refills: 0 | DISCHARGE

## 2020-08-19 RX ORDER — REMDESIVIR 5 MG/ML
100 INJECTION INTRAVENOUS ONCE
Refills: 0 | Status: COMPLETED | OUTPATIENT
Start: 2020-08-19 | End: 2020-08-19

## 2020-08-19 RX ADMIN — Medication 6 MILLIGRAM(S): at 05:43

## 2020-08-19 RX ADMIN — AMLODIPINE BESYLATE 5 MILLIGRAM(S): 2.5 TABLET ORAL at 05:42

## 2020-08-19 RX ADMIN — Medication 1: at 12:06

## 2020-08-19 RX ADMIN — Medication 81 MILLIGRAM(S): at 12:07

## 2020-08-19 RX ADMIN — Medication 2 GRAM(S): at 12:07

## 2020-08-19 RX ADMIN — PANTOPRAZOLE SODIUM 40 MILLIGRAM(S): 20 TABLET, DELAYED RELEASE ORAL at 06:00

## 2020-08-19 RX ADMIN — REMDESIVIR 500 MILLIGRAM(S): 5 INJECTION INTRAVENOUS at 13:00

## 2020-08-19 RX ADMIN — Medication 25 MILLIGRAM(S): at 05:42

## 2020-08-19 NOTE — DISCHARGE NOTE NURSING/CASE MANAGEMENT/SOCIAL WORK - PATIENT PORTAL LINK FT
You can access the FollowMyHealth Patient Portal offered by [facility] by registering at the following website: [email or web address]. By joining [facility]’s FollowMyHealth portal, you will also be able to view your health information using other applications (apps) compatible with our system.

## 2020-08-19 NOTE — DISCHARGE NOTE PROVIDER - CARE PROVIDERS DIRECT ADDRESSES
,DirectAddress_Unknown ,DirectAddress_Unknown,DirectAddress_Unknown ,DirectAddress_Unknown,DirectAddress_Unknown,[email or web address]

## 2020-08-19 NOTE — DISCHARGE NOTE PROVIDER - NSDCMRMEDTOKEN_GEN_ALL_CORE_FT
amLODIPine 5 mg oral tablet: 1 tab(s) orally once a day  aspirin 81 mg oral delayed release tablet: 1 tab(s) orally once a day  atorvastatin 80 mg oral tablet: 1 tab(s) orally once a day (at bedtime)  chlorthalidone 25 mg oral tablet: 1 tab(s) orally once a day  omega-3 polyunsaturated fatty acids ethyl esters 1000 mg oral capsule: 2 cap(s) orally once a day

## 2020-08-19 NOTE — DISCHARGE NOTE PROVIDER - HOSPITAL COURSE
Patient is a 70yo female with PMH of hypertension, dyslipidemia, and osteoarthritis s/p L knee replacement who presented to the ED complaining of worsening shortness of breath. Patient was previously seen in the [facility] ED on 8/10/2020 and tested positive for COVID-19.  Sepsis ruled out on admission. COVID-19 PCR 8/10/2020: positive, 8/15: Positive. Chest X-ray 8/15/2020: worsening bilateral infiltrates. CT angio chest 8/15/2020: Multifocal bilateral patchy ground-glass and consolidative airspace opacities, consistent with viral pneumonia in the appropriate clinical setting. No evidence of acute pulmonary embolism. D-dimer: 3033 (8/15)>> 647 (8/17). Procalcitonin, Serum 8/15 0.06 >>0.05> 8/17: 0.04 . CRP: 8.78>>2.83 (8/17) , Ferritin: 289, no transaminitis, or DAJUAN. No lymphopenia on admission or previous ED visit. ID was recommended. s/p 5 days pf RDV as she was in cytokine release syndrome. Patient improved on supplemental oxygen. Her Hep C was positive with viral load pending. She will follow up with Hepatology clinic and PMD.  and is medically stable for discharge.         Medical Reconciliation has been reviewed with Medical Attending. All consults cleared for discharge. Discharge instructions discussed and patient aware when to seek medical attention. Patient has proper follow up. All resulted including diagnosis and patient aware they require further follow up. Stressed importance of proper follow up. Medications sent to the pharmacy , checked insurance coverage and changes discussed. All questions and concerns from patient and family addressed. Understanding of instructions verbalized. Patient is a 68yo female with PMH of hypertension, dyslipidemia, and osteoarthritis s/p L knee replacement who presented to the ED complaining of worsening shortness of breath. Patient was previously seen in the [facility] ED on 8/10/2020 and tested positive for COVID-19.  Sepsis ruled out on admission. COVID-19 PCR 8/10/2020: positive, 8/15: Positive. Chest X-ray 8/15/2020: worsening bilateral infiltrates. CT angio chest 8/15/2020: Multifocal bilateral patchy ground-glass and consolidative airspace opacities, consistent with viral pneumonia in the appropriate clinical setting. No evidence of acute pulmonary embolism. D-dimer: 3033 (8/15)>> 647 (8/17). Procalcitonin, Serum 8/15 0.06 >>0.05> 8/17: 0.04 . CRP: 8.78>>2.83 (8/17) , Ferritin: 289, no transaminitis, or DAJUAN. No lymphopenia on admission or previous ED visit. ID was recommended. s/p 5 days pf RDV as she was in cytokine release syndrome. Patient improved on supplemental oxygen. Her Hep C was positive with viral load pending. She will follow up with Hepatology clinic and PMD.  and is medically stable for discharge.         Medical Reconciliation has been reviewed with Medical Attending. All consults cleared for discharge. Discharge instructions discussed and patient aware when to seek medical attention. Patient has proper follow up. All resulted including diagnosis and patient aware they require further follow up. Stressed importance of proper follow up. Medications sent to the pharmacy , checked insurance coverage and changes discussed. All questions and concerns from patient and family addressed. Understanding of instructions verbalized.

## 2020-08-19 NOTE — PROGRESS NOTE ADULT - SUBJECTIVE AND OBJECTIVE BOX
GRAHAM STUBBS 69y Female  MRN#: [id number]   CODE STATUS: FULL      SUBJECTIVE  Patient is a 69y old Female who presents with a chief complaint of COVID-19 pneumonia (19 Aug 2020 07:56)    Currently admitted to medicine with the primary diagnosis of COVID 19 pneumonia- improved    Today is hospital day 4d,   INTERVAL HPI/OVERNIGHT EVENTS: none. Last day of RDv and sterods    This morning she is sitting in chair comfortably and having breakfast. Compliants of sore throat .   Denies chest pain, shortness of breath, abdominal pain, nausea, vomiting or changes in bowel habits.   has no complaints today.     Urinating and stooling appropriately.    Present Today:           Orr Catheter (x)No/ ()Yes?   Indication:             Central Line (x)No/ ()Yes?   Indication:          IV Fluids (x)No/ ()Yes? Type:  Rate:  Indication:    OBJECTIVE  PAST MEDICAL & SURGICAL HISTORY  Osteoarthritis  Dyslipidemia  Hypertension  History of tonsillectomy  History of left knee replacement: 2/2020    ALLERGIES:  No Known Allergies    HOME MEDICATIONS:  Home Medications:  amLODIPine 5 mg oral tablet: 1 tab(s) orally once a day (15 Aug 2020 10:09)  aspirin 81 mg oral tablet: 1 tab(s) orally once a day (15 Aug 2020 10:09)  atorvastatin 80 mg oral tablet: 1 tab(s) orally once a day (15 Aug 2020 10:09)  chlorthalidone 25 mg oral tablet: 1 tab(s) orally once a day (15 Aug 2020 10:09)  Omega-3 oral capsule: 1 milligram(s) orally once a day (15 Aug 2020 10:09)    MEDICATIONS:  STANDING MEDICATIONS  amLODIPine   Tablet 5 milliGRAM(s) Oral daily  aspirin enteric coated 81 milliGRAM(s) Oral daily  atorvastatin 80 milliGRAM(s) Oral at bedtime  bisacodyl 5 milliGRAM(s) Oral at bedtime  chlorhexidine 4% Liquid 1 Application(s) Topical <User Schedule>  dextrose 5%. 1000 milliLiter(s) (50 mL/Hr) IV Continuous <Continuous>  dextrose 50% Injectable 12.5 Gram(s) IV Push once  dextrose 50% Injectable 25 Gram(s) IV Push once  dextrose 50% Injectable 25 Gram(s) IV Push once  enoxaparin Study Injectable () 1 Dose(s) SubCutaneous two times a day  hydrochlorothiazide 25 milliGRAM(s) Oral daily  insulin lispro (HumaLOG) corrective regimen sliding scale   SubCutaneous three times a day before meals  omega-3-Acid Ethyl Esters 2 Gram(s) Oral daily  pantoprazole    Tablet 40 milliGRAM(s) Oral before breakfast  remdesivir  IVPB   IV Intermittent   remdesivir  IVPB 100 milliGRAM(s) IV Intermittent every 24 hours  senna 2 Tablet(s) Oral at bedtime    PRN MEDICATIONS  dextrose 40% Gel 15 Gram(s) Oral once PRN  glucagon  Injectable 1 milliGRAM(s) IntraMuscular once PRN      VITAL SIGNS: Last 24 Hours  T(C): 36.6 (19 Aug 2020 05:40), Max: 36.6 (18 Aug 2020 21:15)  T(F): 97.8 (19 Aug 2020 05:40), Max: 97.9 (18 Aug 2020 21:15)  HR: 69 (19 Aug 2020 05:40) (62 - 72)  BP: 125/56 (19 Aug 2020 05:40) (122/57 - 141/72)  RR: 20 (19 Aug 2020 05:40) (16 - 20)  SpO2: 98% (19 Aug 2020 08:23) (96% - 99%)    LABS:                        11.8   11.16 )-----------( 474      ( 19 Aug 2020 06:11 )             36.7     08-19    138  |  100  |  17  ----------------------------<  119<H>  3.9   |  27  |  0.7    Ca    9.4      19 Aug 2020 06:11  Mg     1.9     08-19    TPro  6.3  /  Alb  3.1<L>  /  TBili  0.2  /  DBili  x   /  AST  46<H>  /  ALT  40  /  AlkPhos  42  08-19    PT/INR - ( 17 Aug 2020 17:28 )   PT: 12.00 sec;   INR: 1.04 ratio      RADIOLOGY:   CT Angio Chest w/ IV Cont (08.15.20 @ 07:37) >  1. Multifocal bilateral patchy groundglass and consolidative airspace opacities, consistent with viral pneumonia in the appropriate clinical setting.    2. No evidence of acute pulmonary embolism.      ECHO:      PHYSICAL EXAM:    GENERAL: NAD, well-developed, AAOx3  HEENT:  Atraumatic, Normocephalic. EOMI, PERRLA, conjunctiva and sclera clear, No JVD  PULMONARY: mild crackles No wheeze  CARDIOVASCULAR: Regular rate and rhythm; No murmurs, rubs, or gallops  GASTROINTESTINAL: Soft, Nontender, Nondistended; Bowel sounds present  MUSCULOSKELETAL:  2+ Peripheral Pulses, No clubbing, cyanosis, or edema  NEUROLOGY: non-focal  SKIN: No rashes or lesions

## 2020-08-19 NOTE — PROGRESS NOTE ADULT - SUBJECTIVE AND OBJECTIVE BOX
GRAHAM STUBBS  69y, Female    All available historical data reviewed    OVERNIGHT EVENTS:  no fevers  feels well and has no complaints   97% RA    ROS:  General: Denies rigors, nightsweats  HEENT: Denies headache, rhinorrhea, sore throat, eye pain  CV: Denies CP, palpitations  PULM: Denies wheezing, hemoptysis  GI: Denies hematemesis, hematochezia, melena  : Denies discharge, hematuria  MSK: Denies arthralgias, myalgias  SKIN: Denies rash, lesions  NEURO: Denies paresthesias, weakness  PSYCH: Denies depression, anxiety    VITALS:  T(F): 97.8, Max: 97.9 (08-18-20 @ 21:15)  HR: 69  BP: 125/56  RR: 20Vital Signs Last 24 Hrs  T(C): 36.6 (19 Aug 2020 05:40), Max: 36.6 (18 Aug 2020 21:15)  T(F): 97.8 (19 Aug 2020 05:40), Max: 97.9 (18 Aug 2020 21:15)  HR: 69 (19 Aug 2020 05:40) (62 - 72)  BP: 125/56 (19 Aug 2020 05:40) (122/57 - 141/72)  BP(mean): --  RR: 20 (19 Aug 2020 05:40) (16 - 20)  SpO2: 99% (19 Aug 2020 05:40) (96% - 99%)    TESTS & MEASUREMENTS:                        11.8   11.16 )-----------( 474      ( 19 Aug 2020 06:11 )             36.7     08-19    138  |  100  |  17  ----------------------------<  x   3.9   |  27  |  x     Ca    9.4      19 Aug 2020 06:11  Mg     1.9     08-19    TPro  6.3  /  Alb  x   /  TBili  0.2  /  DBili  x   /  AST  x   /  ALT  x   /  AlkPhos  x   08-19    LIVER FUNCTIONS - ( 19 Aug 2020 06:11 )  Alb: x     / Pro: 6.3 g/dL / ALK PHOS: x     / ALT: x     / AST: x     / GGT: x                   RADIOLOGY & ADDITIONAL TESTS:  Personal review of radiological diagnostics performed  Echo and EKG results noted when applicable.     MEDICATIONS:  amLODIPine   Tablet 5 milliGRAM(s) Oral daily  aspirin enteric coated 81 milliGRAM(s) Oral daily  atorvastatin 80 milliGRAM(s) Oral at bedtime  bisacodyl 5 milliGRAM(s) Oral at bedtime  chlorhexidine 4% Liquid 1 Application(s) Topical <User Schedule>  dextrose 40% Gel 15 Gram(s) Oral once PRN  dextrose 5%. 1000 milliLiter(s) IV Continuous <Continuous>  dextrose 50% Injectable 12.5 Gram(s) IV Push once  dextrose 50% Injectable 25 Gram(s) IV Push once  dextrose 50% Injectable 25 Gram(s) IV Push once  enoxaparin Study Injectable () 1 Dose(s) SubCutaneous two times a day  glucagon  Injectable 1 milliGRAM(s) IntraMuscular once PRN  hydrochlorothiazide 25 milliGRAM(s) Oral daily  insulin lispro (HumaLOG) corrective regimen sliding scale   SubCutaneous three times a day before meals  omega-3-Acid Ethyl Esters 2 Gram(s) Oral daily  pantoprazole    Tablet 40 milliGRAM(s) Oral before breakfast  remdesivir  IVPB   IV Intermittent   remdesivir  IVPB 100 milliGRAM(s) IV Intermittent every 24 hours  senna 2 Tablet(s) Oral at bedtime      ANTIBIOTICS:  remdesivir  IVPB   IV Intermittent   remdesivir  IVPB 100 milliGRAM(s) IV Intermittent every 24 hours

## 2020-08-19 NOTE — PROGRESS NOTE ADULT - REASON FOR ADMISSION
COVID-19 pneumonia

## 2020-08-19 NOTE — DISCHARGE NOTE PROVIDER - NSDCCPCAREPLAN_GEN_ALL_CORE_FT
PRINCIPAL DISCHARGE DIAGNOSIS  Diagnosis: Acute hypoxemic respiratory failure due to COVID-19  Assessment and Plan of Treatment: YOU WERE TESTED POSITIVE WITH COVID 19 . You required supplemental oxygen which improved your respiratory status. CT angio chest was done to rule out clots. You were followed by ID and you received remdesevir as a part of trial as you had cytokine release syndrome. You respiratory status improved. Please follow up with your PMD and Dr. Moore  PLEASE SEEK MEDICAL ATTENTION IMMEDIATELY if you are short of breath/cough/chest pain/ fever. It is IMPORTANT to self quarantine yourself for 14 days. PRINCIPAL DISCHARGE DIAGNOSIS  Diagnosis: Acute hypoxemic respiratory failure due to COVID-19  Assessment and Plan of Treatment: YOU WERE TESTED POSITIVE WITH COVID 19 . You required supplemental oxygen which improved your respiratory status. CT angio chest was done to rule out clots. You were followed by ID and you received remdesevir as a part of trial as you had cytokine release syndrome. You respiratory status improved. Please follow up with your PMD and Dr. Moore  PLEASE SEEK MEDICAL ATTENTION IMMEDIATELY if you are short of breath/cough/chest pain/ fever. It is IMPORTANT to self quarantine yourself for 14 days.         SECONDARY DISCHARGE DIAGNOSES  Diagnosis: Hepatitis C virus  Assessment and Plan of Treatment: You have been tested positive for Hepatitis C with viral load pending. You  need treatment and appropriate vaccination . Please follow up with  in Hepatology clinic in 2 weeks.

## 2020-08-19 NOTE — PROGRESS NOTE ADULT - ASSESSMENT
ASSESSMENT  69y F with PMH of OA, HLD, HTN for COVID-19 pneumonia    Osteoarthritis  Dyslipidemia  Hypertension    IMPRESSION  #COVID-19 pneumonia, CT > 50 % of lung involvement  -no ongoing hypoxemia  - 8/10 COVID-19 PCR positive  - CTA Chest 8/15 with Multifocal bilateral patchy ground-glass and consolidative airspace opacities, consistent with viral pneumonia in the appropriate clinical setting. Negative for PE  - D-Dimer 8/15 3033  - Procalcitonin, Serum 8/15 0.06  - Ferritin, Serum: 289 ng/mL (08.15.20 @ 03:37)  - C-Reactive Protein, Serum: 8.78 mg/dL (08.15.20 @ 03:37)  - No transaminitis, Cr stable    #Obesity BMI (kg/m2): 37.2, 37.2  #DM   #Abx allergy: NKDA        RECOMMENDATIONS  - d/c remdesivir after today dose  - d/c dexamethasone after todays dose  Discharge pt  from ID standpoint as resolution of fevers, clinical improvement in SOB/cough, improvement in O2 requirements. O2 saturation at rest >90%.   recall prn please

## 2020-08-19 NOTE — DISCHARGE NOTE PROVIDER - CARE PROVIDER_API CALL
Max Swanson  Phone: (   )    -  Fax: (   )    -  Established Patient  Follow Up Time: 2 weeks Max Swanson  Phone: (   )    -  Fax: (   )    -  Established Patient  Follow Up Time: 2 weeks    Uriel Vizcaino)  Internal Medicine  [address] [place] [address]  Phone: [phone]  Fax: [phone]  Follow Up Time: 2 weeks Uriel Vizcaino)  Internal Medicine  [address] [place] [address]  Phone: [phone]  Fax: [phone]  Follow Up Time: 2 weeks    Max Swanson  Phone: (   )    -  Fax: (   )    -  Established Patient  Follow Up Time: 2 weeks    Rigoberto Hdaley)  Gastroenterology; Internal Medicine  [address] [place] [address]  Phone: [phone]  Fax: [phone]  Follow Up Time: 2 weeks

## 2020-08-19 NOTE — DISCHARGE NOTE PROVIDER - PROVIDER TOKENS
FREE:[LAST:[Sky],FIRST:[Max],PHONE:[(   )    -],FAX:[(   )    -],FOLLOWUP:[2 weeks],ESTABLISHEDPATIENT:[T]] FREE:[LAST:[Gejonathon],FIRST:[Max],PHONE:[(   )    -],FAX:[(   )    -],FOLLOWUP:[2 weeks],ESTABLISHEDPATIENT:[T]],PROVIDER:[TOKEN:[[id number]],FOLLOWUP:[2 weeks]] PROVIDER:[TOKEN:[[id number]],FOLLOWUP:[2 weeks]],FREE:[LAST:[Gecht],FIRST:[Max],PHONE:[(   )    -],FAX:[(   )    -],FOLLOWUP:[2 weeks],ESTABLISHEDPATIENT:[T]],PROVIDER:[TOKEN:[[id number]],FOLLOWUP:[2 weeks]]

## 2020-08-19 NOTE — RESEARCH COMMUNICATION NOTE - NS AS RESEARCH STUDY NAME FT
Systemic Anticoagulation with Full Dose Low Molecular Weight Heparin (LMWH) Vs. Prophylactic or Intermediate Dose LMWH in High Risk COVID-19 Patients (HEP-COVID Trial)  IND Exempt: IND #[id number]  : Yasmeen Whitfield MD

## 2020-08-19 NOTE — RESEARCH COMMUNICATION NOTE - NS AS RESEARCH COMMUNICATION NOTE FT
Patient is part of the HEP-COVID trial. Patient had no research related adverse events. If there are any questions please contact research team at [phone].

## 2020-08-19 NOTE — PROGRESS NOTE ADULT - ASSESSMENT
Patient is a 70yo female with PMH of hypertension, dyslipidemia, and osteoarthritis s/p L knee replacement who presented to the ED complaining of worsening shortness of breath. Patient was previously seen in the [facility] ED on 8/10/2020 and tested positive for COVID-19.    #Acute respiratory distress secondary to COVID-19 pneumonia with evolving cytokine release syndrome- improved  - Patient currently saturating 95% on room air, comfortable no respi distress  - Sepsis ruled out on admission  - COVID-19 PCR 8/10/2020: positive, 8/15: Positive  - Chest X-ray 8/15/2020: worsening bilateral infiltrates  - CT angio chest 8/15/2020: Multifocal bilateral patchy ground-glass and consolidative airspace opacities, consistent with viral pneumonia in the appropriate clinical setting. No evidence of acute pulmonary embolism.  - D-dimer: 3033 (8/15)>> 647 (8/17)  - Procalcitonin, Serum 8/15 0.06 >>0.05> 8/17: 0.04   - LDH: 375 (elevated)  -  CRP: 8.78>>2.83 (8/17) , Ferritin: 289, no transaminitis, or DAJUAN  - Triglycerides: 128  - No lymphopenia on admission or previous ED visit  ·	 ID following: last day of  Remdesavir,   ·	Covid Antibody Ig G : positive  ·	decreased Lovenox 40mg SQ Q12H  ·	s/p 5 days of steroids  ·	Bcx: pending  ·	trend d-dimer, ferritin, procalcitonin,  LFTs  ·	no need for toci   ·	airborne, contact , droplet isolation precautions      #Hepatitis C  - detected, viral load pending  - never treated before  - needs outpt hepatology  follow up and appropriate vaccination     # DM II with hyperglycemia - on steroids   - AIC- 6.8  - Patient was on metformin 500mg PO BID earlier this year, but she claims it was for weight loss prior to her knee replacement- FS AC/HS + sliding scale    #Chest pain, likely pleuritic vs costochondritis, resolved  - Troponin negative x1, trend  - No acute ST changes on ECG  - No intervention at this time    #Constipation  - on bowel regimen; had two loose BM today    #Hypertension  - Continue with amlodipine 5mg PO QD  - Convert home medication chlorthalidone to hydrochlorothiazide 25mg PO QD  - Monitor blood pressure    #Dyslipidemia  - Continue with atorvastatin 80mg PO QHS and omega-3 2mg PO QD    #Morbid obesity  - BMI 41   - Counseled patient on weight loss and lifestyle modifications    #Misc  - DVT Prophylaxis: Lovenox 40mg SQ Q12H   - ADL score: 24; Mobility: 24  - GI Prophylaxis: pantoprazole 40mg PO QD   - Diet: DASH/TLC  - Activity: ambulate with walker (within room)  - IV Fluids: not indicated Code Status: Full Code

## 2020-08-19 NOTE — DISCHARGE NOTE PROVIDER - INSTRUCTIONS
You should follow DASH diet . DASH stands for Dietary Approaches to Stop Hypertension.  DASH lowers high blood pressure and improves levels of cholesterol. This reduces your risk of getting heart disease.The DASH eating planEmphasizes vegetables, fruits, and whole-grains  Includes fat-free or low-fat dairy products, fish, poultry, beans, nuts, and vegetable oils  Limits foods that are high in saturated fat. These foods include fatty meats, full-fat dairy products, and tropical oils such as coconut, palm kernel, and palm oils.  Limits sugar-sweetened beverages and sweets  Along with DASH, other lifestyle changes can help lower your blood pressure. They include staying at a healthy weight, exercising, and not smoking.

## 2020-08-20 LAB — CRP SERPL-MCNC: 1.32 MG/DL — HIGH (ref 0–0.4)

## 2020-08-21 PROBLEM — I10 ESSENTIAL (PRIMARY) HYPERTENSION: Chronic | Status: ACTIVE | Noted: 2020-08-15

## 2020-08-21 PROBLEM — E78.5 HYPERLIPIDEMIA, UNSPECIFIED: Chronic | Status: ACTIVE | Noted: 2020-08-15

## 2020-08-21 PROBLEM — M19.90 UNSPECIFIED OSTEOARTHRITIS, UNSPECIFIED SITE: Chronic | Status: ACTIVE | Noted: 2020-08-15

## 2020-08-21 LAB
HCV RNA FLD QL NAA+PROBE: SIGNIFICANT CHANGE UP
PROT C ACT/NOR PPP: 147 % — HIGH (ref 65–129)
PROT S FREE PPP-ACNC: 47 % NORMAL — LOW (ref 60–140)

## 2020-08-24 DIAGNOSIS — R07.9 CHEST PAIN, UNSPECIFIED: ICD-10-CM

## 2020-08-24 DIAGNOSIS — Z00.6 ENCOUNTER FOR EXAMINATION FOR NORMAL COMPARISON AND CONTROL IN CLINICAL RESEARCH PROGRAM: ICD-10-CM

## 2020-08-24 DIAGNOSIS — J12.89 OTHER VIRAL PNEUMONIA: ICD-10-CM

## 2020-08-24 DIAGNOSIS — N17.9 ACUTE KIDNEY FAILURE, UNSPECIFIED: ICD-10-CM

## 2020-08-24 DIAGNOSIS — U07.1 COVID-19: ICD-10-CM

## 2020-08-24 DIAGNOSIS — I10 ESSENTIAL (PRIMARY) HYPERTENSION: ICD-10-CM

## 2020-08-24 DIAGNOSIS — B19.20 UNSPECIFIED VIRAL HEPATITIS C WITHOUT HEPATIC COMA: ICD-10-CM

## 2020-08-24 DIAGNOSIS — E66.01 MORBID (SEVERE) OBESITY DUE TO EXCESS CALORIES: ICD-10-CM

## 2020-08-24 DIAGNOSIS — J80 ACUTE RESPIRATORY DISTRESS SYNDROME: ICD-10-CM

## 2020-08-24 DIAGNOSIS — E11.65 TYPE 2 DIABETES MELLITUS WITH HYPERGLYCEMIA: ICD-10-CM

## 2020-08-24 DIAGNOSIS — Z79.82 LONG TERM (CURRENT) USE OF ASPIRIN: ICD-10-CM

## 2020-08-25 LAB — HCV RNA FLD QL NAA+PROBE: SIGNIFICANT CHANGE UP

## 2020-09-04 ENCOUNTER — APPOINTMENT (OUTPATIENT)
Dept: GASTROENTEROLOGY | Facility: CLINIC | Age: 70
End: 2020-09-04
Payer: MEDICARE

## 2020-09-04 DIAGNOSIS — Z80.0 FAMILY HISTORY OF MALIGNANT NEOPLASM OF DIGESTIVE ORGANS: ICD-10-CM

## 2020-09-04 DIAGNOSIS — B19.20 UNSPECIFIED VIRAL HEPATITIS C W/OUT HEPATIC COMA: ICD-10-CM

## 2020-09-04 DIAGNOSIS — R94.5 ABNORMAL RESULTS OF LIVER FUNCTION STUDIES: ICD-10-CM

## 2020-09-04 PROCEDURE — 99448 NTRPROF PH1/NTRNET/EHR 21-30: CPT

## 2020-09-04 NOTE — HISTORY OF PRESENT ILLNESS
[Home] : at home, [unfilled] , at the time of the visit. [Medical Office: ([facility])___] : at the medical office located in  [Verbal consent obtained from patient] : the patient, [unfilled] [de-identified] : 69 yr old female patient with HTN, Dyslipidemia, OA, Covid + referrred to Gi for Hepatitis C positive. However review of tests indicate that she is HCV RNA negative. Has remote history of Hep C about 30-40 yrs ago was treated. Has mild LFT elevation. No abdominal Pian.\par Apetite good, no dysphagia. BM normal no blood > Had colonoscopy about 2 years ago.

## 2020-09-04 NOTE — ASSESSMENT
[FreeTextEntry1] : Previous lab tests reviewed.\par 1) Hepatitis C RNA NEGATIVE.\par 2) LFT elevation: Repeat LFTs, U/S of abdomen after testing negative for Covid.\par Followup Televisit in 3 months.

## 2021-06-01 ENCOUNTER — NON-APPOINTMENT (OUTPATIENT)
Age: 71
End: 2021-06-01

## 2021-06-03 ENCOUNTER — APPOINTMENT (OUTPATIENT)
Dept: BREAST CENTER | Facility: CLINIC | Age: 71
End: 2021-06-03
Payer: MEDICARE

## 2021-06-03 VITALS
TEMPERATURE: 97.5 F | BODY MASS INDEX: 43.19 KG/M2 | HEIGHT: 60 IN | DIASTOLIC BLOOD PRESSURE: 82 MMHG | WEIGHT: 220 LBS | SYSTOLIC BLOOD PRESSURE: 130 MMHG

## 2021-06-03 DIAGNOSIS — R10.33 PERIUMBILICAL PAIN: ICD-10-CM

## 2021-06-03 DIAGNOSIS — Z78.9 OTHER SPECIFIED HEALTH STATUS: ICD-10-CM

## 2021-06-03 DIAGNOSIS — Z80.3 FAMILY HISTORY OF MALIGNANT NEOPLASM OF BREAST: ICD-10-CM

## 2021-06-03 DIAGNOSIS — Z87.891 PERSONAL HISTORY OF NICOTINE DEPENDENCE: ICD-10-CM

## 2021-06-03 DIAGNOSIS — Z86.79 PERSONAL HISTORY OF OTHER DISEASES OF THE CIRCULATORY SYSTEM: ICD-10-CM

## 2021-06-03 DIAGNOSIS — Z86.39 PERSONAL HISTORY OF OTHER ENDOCRINE, NUTRITIONAL AND METABOLIC DISEASE: ICD-10-CM

## 2021-06-03 PROCEDURE — 99204 OFFICE O/P NEW MOD 45 MIN: CPT

## 2021-06-03 NOTE — PAST MEDICAL HISTORY
[Menarche Age ____] : age at menarche was [unfilled] [Menopause Age____] : age at menopause was [unfilled] [Total Preg ___] : G[unfilled] [Live Births ___] : P[unfilled]  [Age At Live Birth ___] : Age at live birth: [unfilled] [History of Hormone Replacement Treatment] : has no history of hormone replacement treatment [FreeTextEntry5] : denies  [FreeTextEntry6] : denies [FreeTextEntry7] : yes in past x 15 years, stopped >30 years prior  [FreeTextEntry8] : denies

## 2021-06-03 NOTE — HISTORY OF PRESENT ILLNESS
[FreeTextEntry1] : Eulalia is a 70 F with a left breast IDC,  ER weakly positive, NE neg, Her 2 neg, fV0sR0T2, stage 1 breast cancer. \par \par Eulalia presented today with her friend, Silvia who was present for the entirety of the consultation. \par \par She has no breast related complaints at this time.  She denies any breast pain, has not palpated any new palpable masses in either breast and denies any nipple discharge or retraction.  \par \par Her work up was as follows: \par 2021 -- b/l screening mammogram \par -R: -no suspicious mass, microcalcifications or architectural distortion\par -L: in lateral breast, asymmetry present \par BIRADS 0 \par \par 2021 -- L dx mammogram and US \par -scattered areas of fibroglandular densities\par -L: @3:00, persistent mass \par L US \par -@3N10, hypoechoic mass, measures 8 x 7 x 13 mm --> BIOPSY \par BIRADS 4c \par \par 2021 -- L US CNBx @3N10 (coil clip) \par -IDC, SBR 3, poorly differentiated with associated lymphocytic reaction \par -ER weakly positive, NE neg (Toni 5/0), Her 2 neg \par \par HISTORICAL RISK FACTORS: \par -no prior breast biopsies, history of L breast lumpectomy for a cyst in the  \par -family history of breast cancer in her mother at age 52\par -, age at first live birth was 22 \par -prior OCP use x 15 years, stopped >30 years prior \par -no gyn surgeries\par \par

## 2021-06-03 NOTE — ASSESSMENT
[FreeTextEntry1] : Eulalia is a 70 F with a screen detected left breast IDC,  ER weakly positive, ND neg, Her 2 neg, [id number], stage 1 breast cancer. \par \par ON exam, I was not able to palpate any suspicious abnormalities within either breast. \par \par Her most recent imaging was a b/l screening mammogram and subsequent L dx mammogram and US On 4/29/2021 and 5/14/2021 which revealed @3N10, in her left breast, her biopsy proven cancer, measuring 8 x 7 x 13 mm. \par \par We have discussed her new diagnosis of breast cancer.  She is currently a stage 1 breast cancer, based off AJCC 8th edition.  We discussed her surgical and other treatment options at this time. \par \par In regards to her left sided breast cancer, she is a great candidate for either breast conservation surgery or a mastectomy.  Her lesion is located @3N10   based off the US so she is a candidate for a nipple sparing mastectomy.  However, there is no difference in survival between a lumpectomy + radiation therapy and a mastectomy.  However the rate of local recurrence is slightly higher with the lumpectomy. The rate of recurrence with a mastectomy is not zero, however, and is likely closer to 4-9%.  \par \par Regardless of if she chooses a mastectomy or a lumpectomy, she would require an evaluation of her axillary lymph nodes via a sentinel lymph node biopsy.  This is done by injecting the perioareolar breast tissue with dye prior to the surgery and removing 1-5 lymph nodes that are the first to drain the breast.  \par \par At this time, she is leaning towards undergoing breast conservation therapy with a LUMPECTOMY.  Her left breast lesion is not palpable on clinical exam, so she will need a radiofrequency guided lumpectomy on the left breast.  In the interim, we will obtain a bilateral breast MRI to further evaluate the extent of her disease. \par \par We did briefly discuss the different radiation options.  If she got a mastectomy, she would likely only need radiation therapy if she had a lot of positive margins. If she undergoes a lumpectomy, she is a candidate for both partial breast irradiation and whole breast irradiation.  We briefly discussed the differences between these two modalities.  \par \par In regards to systemic therapy, we briefly discussed both chemotherapy and endocrine therapy. If the tumor is larger than 1 cm and her 2 negative, we would likely need to send an additional study on her tumor sample, called an oncotype DX to make the determination regarding if chemotherapy would help her.  At the completion of all these treatments, she should get endocrine therapy, at current time she would need an AI, for a total of at least 5 years, although her ER is only weakly positive so she may not derive as much benefit from the endocrine therapy.  \par \par Per ASBrS consensus guidelines, any patient with a diagnosis of breast cancer should be offered panel genetic testing as 10% of these patients were found to have a mutation. In addition, her mother also had breast cancer, diagnosed at age 52. THis was offered to her and she would like to proceed with panel genetic testing.  Her blood was drawn today.\par \par All of her questions were answered.  She knows to call with any further questions or concerns.\par \par PLAN\par -breast MRI \par -SOZO\par -INVITAE panel genetic testing -- blood drawn \par -OR: LEFT BREAST WIDE LOCAL EXCISION WITH RF LOCALIZATION, SENTINEL LYMPH NODE MAPPING AND BIOPSY, POSSIBLE AXILLARY LYMPH NODE DISSECTION \par -DIAGNOSIS: LEFT BREAST CANCER \par -f/up after

## 2021-06-03 NOTE — DATA REVIEWED
[FreeTextEntry1] : Procedure(s) \par Accession Number(s)\par Date of Service\par MAMMO SCREENING BILATERAL\par [id number]\par 4/29/21\par  \par  \par  \par     \par IMPRESSION: \par  \par Left breast asymmetry. Further evaluation with diagnostic left breast mammogram and breast ultrasound is advised.\par  \par An additional imaging exam of the left breast(s) is recommended. The patient will be sent a letter to return for additional imaging.\par  \par BI-RADS 0: INCOMPLETE - NEED ADDITIONAL IMAGING EVALUATION\par  \par  \par FINDINGS:\par  \par MAMMO TOMOSYNTHESIS SCREENING BILATERAL\par  \par Clinical Breast Exam: Patient does report clinical breast exam in the last year.\par  \par Clinical Indication: Routine screening. Family history of mother with breast cancer.\par  \par Compared to: 01/14/2020, 12/07/2018, and 11/29/2017\par  \par Tomosynthesis and 2D imaging of the breast(s) were performed.  Current study was also evaluated with a computer aided detection (CAD) system.\par  \par Breast density: There are scattered areas of fibroglandular density.\par  \par There is an asymmetry in the lateral aspect of the left breast. No significant masses, calcifications, or other findings are seen in the right breast.\par  \par Electronic Signature: I personally reviewed the images and agree with this report. Final Report: Dictated by  and Signed by Attending Mary Palomo MD 5/6/2021 9:03 AM\par \par Procedure(s) \par Accession Number(s)\par Date of Service\par MAMMO CALLBACK FROM SCREENING LEFT\par US BREAST\par [id number]\par [id number]\par 5/14/21\par 5/14/21\par  \par  \par  \par     \par OVERALL IMPRESSION: \par  \par Indeterminant mass in the left breast at 3:00 location 10 cm from the nipple or spine to the mammographic abnormality for which ultrasound core biopsy is recommended.\par  \par Biopsy of the left breast(s) is recommended. A letter will be sent to the patient to return for a biopsy.\par  \par The findings and recommendations were discussed with the patient.\par  \par BI-RADS 4: SUSPICIOUS - CATEGORY 4C: HIGH SUSPICION FOR MALIGNANCY\par  \par  \par FINDINGS:\par  \par MAMMO TOMOSYNTHESIS CALLBACK FROM SCREENING LEFT, US BREAST LIMITED LEFT\par  \par Clinical Breast Exam: Patient does report clinical breast exam in the last year.\par  \par Clinical Indication: Family history of mother with breast cancer. Abnormal Findings on Mammogram.\par  \par Compared to: 04/29/2021, 01/14/2020, and 12/07/2018\par  \par MAMMOGRAM: \par  \par Tomosynthesis and 2D imaging of the breast(s) were performed.  Current study was also evaluated with a computer aided detection (CAD) system.\par  \par Breast Density: There are scattered areas of fibroglandular density.\par  \par In the left breast at 3:00 location there is a mass which persists on additional imaging.\par  \par US BREAST LIMITED LEFT\par  \par Color flow, Gray scale and real-time ultrasound of the left breast was performed and targeted to the area(s) of interest.\par  \par In the left breast at 3:00 location 10 cm from the nipple corresponding to the mammographic abnormality there is a hypoechoic mass measuring 0.8 x 0.7 x 1.3 cm. Ultrasound core biopsy is recommended for further evaluation.\par  \par Electronic Signature: I personally reviewed the images and agree with this report. Final Report: Dictated by  and Signed by Attending Dang Sam MD 5/14/2021 9:45 AM\par \par Procedure(s) \par Accession Number(s)\par Date of Service\par US BREAST CORE BIOPSY\par MAMMO DIGITAL POST PROCEDURE LEFT\par [id number]\par [id number]\par 5/20/21\par 5/20/21\par  \par Pathology Report Received From [facility]:\par  \par The pathology report of the left breast ultrasound-guided core biopsy dated 5/20/2021 indicated that the target at 3:00 axis is MALIGNANT \par  \par  \par Final Diagnosis  \par A. Breast, left, 3 o'clock, 10 cm FN, ultrasound (US) guided core biopsy: \par - Invasive ductal carcinoma, poorly differentiated, associated with lymphocytic reaction. \par  \par  \par  \par  \par The pathology results are concordant with the imaging findings.\par  \par The patient has been notified of these results on 5/26/2021 at 4:00 PM. She has been advised to contact your office and to arrange for SURGICAL AND ONCOLOGIC CONSULTATION AND MANAGEMENT.\par  \par Surgical consultation of the left breast(s) is recommended. _\par  \par Electronic Signature: I personally reviewed the images and agree with this report. Final Report: Dictated by  and Signed by Attending Mayr Palomo MD 5/26/2021 4:03 PM\par  \par Addended by: Mary Fields MD on 5/26/2021  4:03 PM\par  \par  \par     \par IMPRESSION: \par  \par Ultrasound guided needle biopsy of the left breast. Pathology pending.\par  \par A final report will be issued when Pathology results are available. _\par  \par  \par FINDINGS:\par  \par US BREAST CORE BIOPSY LEFT, MAMMO DIGITAL POST PROCEDURE LEFT\par  \par HISTORY: Ultrasound of the left breast dated 5/14/2021 revealed a nodule at 3 o'clock which was recommended for biopsy.\par  \par Benefits, risks and alternatives to the procedure were explained to the patient and informed written consent was obtained. \par  \par The patient denied taking aspirin or anticoagulants and stated no allergies to topical antiseptic solutions or local anesthetic. \par  \par Utilizing universal protocol, site and patient verification was performed prior to starting the procedure.\par  \par PROCEDURE: After sterile skin preparation, local anesthesia was achieved with 1% lidocaine. Under ultrasound guidance, a 12G vacuum assisted needle biopsy device was used to obtain multiple core specimens. \par  \par After the procedure, a coil shaped marking clip was deployed in the area of sampling. \par  \par The patient tolerated the procedure well without immediate complications. \par  \par POST PROCEDURE MAMMOGRAM FOR MARKER PLACEMENT\par  \par A post-procedure mammogram was performed and demonstrates a clip in the appropriate location.\par  \par Electronic Signature: I personally reviewed the images and agree with this report. Final Report: Dictated by  and Signed by Attending Mary Palomo MD 5/20/2021 11:14 AM

## 2021-06-04 ENCOUNTER — NON-APPOINTMENT (OUTPATIENT)
Age: 71
End: 2021-06-04

## 2021-06-11 ENCOUNTER — LABORATORY RESULT (OUTPATIENT)
Age: 71
End: 2021-06-11

## 2021-06-11 ENCOUNTER — OUTPATIENT (OUTPATIENT)
Dept: OUTPATIENT SERVICES | Facility: HOSPITAL | Age: 71
LOS: 1 days | Discharge: HOME | End: 2021-06-11

## 2021-06-11 DIAGNOSIS — Z96.652 PRESENCE OF LEFT ARTIFICIAL KNEE JOINT: Chronic | ICD-10-CM

## 2021-06-11 DIAGNOSIS — Z90.89 ACQUIRED ABSENCE OF OTHER ORGANS: Chronic | ICD-10-CM

## 2021-06-11 DIAGNOSIS — Z02.9 ENCOUNTER FOR ADMINISTRATIVE EXAMINATIONS, UNSPECIFIED: ICD-10-CM

## 2021-06-12 NOTE — ED ADULT TRIAGE NOTE - PAIN: PRESENCE, MLM
Code Status:  FULL CODE  Visit Type: Follow-up (pain, weakness, f2f walker )     Facility:  [facility] [[id number]]        Facility Type: SNF (Skilled Nursing Facility, TCU)    History of Present Illness: Rogelio Marshall is a 74 y.o. male with a past medical history for hypertension and hyperlipidemia.  He does have a history of previous SBO in 2010.  He was recently hospitalized at [facility] due to increased weakness, low back pain and inability to function and ADLs.  He has been recently worked up by his PCP with a CT of thoracic and lumbar spine on 9/18 showing extensive osteoporotic compression fractures and suggestion of possible myeloma.  He was then seen by a spine surgeon who stated he was not a good candidate for surgery due to his poor bone quality.  He continued to do further work-up until he was admitted to the hospital.       During his hospitalization he was consulted by hematology on 10/11 who discussed the new diagnosis of multiple myeloma.  He was started on Os-Baljinder 500 mg 3 times daily and was given pamidronate x1 during his hospital stay.  He then underwent a bone marrow biopsy biopsy and neurosurgery consult.  Neurosurgery did recommend a CT which was stable and recommended a brace for comfort.  However patient declined the brace.  Pain was a limiting factor in his care and he was seen by palliative medicine.  Pain became controlled with scheduled Tylenol 3 times daily, scheduled ibuprofen every 6 hours, lidocaine patches/menthol patches, scheduled gabapentin, scheduled Robaxin and PRN oxycodone.      He did have HALLIE showing his creatinine in July was 0.76 and elevated to 1.72 on 10/5 up to 2.19 on 10/9 and then came down to 1.05 on 10/12.  He has been taking frequent doses of scheduled ibuprofen for the past 3 months.      Today, he reports that pain is well controlled with current regimen.  He has been using oxycodone 1 time in the morning and this seems to carry out along with acetaminophen  throughout the day.  He also continues with scheduled ibuprofen and Robaxin.  He continues to need standby assist with ADLs.    Today he is requesting that I restart his vitamin D which was held in response to minimizing nonessential medications.  He also is requesting hydroxychloroquine and zinc for COVID-19 prophylaxis.  His Covid test this week was negative and he has been asymptomatic.    Review of Systems   Patient denies fever, chills, headache, lightheadedness, dizziness, rhinorrhea, cough, congestion, shortness of breath, chest pain, palpitations, abdominal pain, n/v, diarrhea, constipation, change in appetite, dysuria, frequency, burning or pain with urination.  Other than stated in HPI all other review of systems is negative.         Physical Exam   In order to maintain social distancing during the COVID pandemic, a visual exam was completed.     Vitals:    10/22/20 1034   BP: 139/77   Pulse: 73   Resp: 18   Temp: 99.1  F (37.3  C)   SpO2: 96%        Patient is well nourished and no acute distress.  Head is AT/NC, EOM intact. Respiratory effort normal. No visible LE edema. Alert and oriented, face symmetric. No visible skin issues or rashes. Mood euthymic      Labs:    Recent Results (from the past 240 hour(s))   Basic Metabolic Panel   Result Value Ref Range    Sodium 134 (L) 136 - 145 mmol/L    Potassium 4.0 3.5 - 5.0 mmol/L    Chloride 107 98 - 107 mmol/L    CO2 21 (L) 22 - 31 mmol/L    Anion Gap, Calculation 6 5 - 18 mmol/L    Glucose 83 70 - 125 mg/dL    Calcium 8.5 8.5 - 10.5 mg/dL    BUN 23 8 - 28 mg/dL    Creatinine 1.24 0.70 - 1.30 mg/dL    GFR MDRD Af Amer >60 >60 mL/min/1.73m2    GFR MDRD Non Af Amer 57 (L) >60 mL/min/1.73m2   HM2(CBC w/o Differential)   Result Value Ref Range    WBC 6.2 4.0 - 11.0 thou/uL    RBC 3.18 (L) 4.40 - 6.20 mill/uL    Hemoglobin 10.4 (L) 14.0 - 18.0 g/dL    Hematocrit 32.2 (L) 40.0 - 54.0 %     (H) 80 - 100 fL    MCH 32.7 27.0 - 34.0 pg    MCHC 32.3 32.0 - 36.0  g/dL    RDW 13.6 11.0 - 14.5 %    Platelets 456 (H) 140 - 440 thou/uL    MPV 9.5 8.5 - 12.5 fL         Assessment:  1. Pathologic compression fracture of spine, sequela     2. Multiple myeloma not having achieved remission (H)     3. Weakness     4. Drug-induced constipation         Plan:   Fracture: Rehabbing well is showing improvement however continues to require a walker.  I did do a face-to-face visit with him regarding authorization for a use walker with wheels due to needing a device for safe ambulation that cannot be met by a cane or crutches safely.  He does have a collapsed vertebrae (M4 8.  5 0 XG) and low back pain (M54.5) that does limited his mobility and he requires a walker for all ADLs and community mobility for his lifetime.    Patient did ask if he should return to driving upon discharge.  I did explain to him there is a risk with driving under the influence of his pain medications due to side effects of drowsiness.  Also explained that the risk could ultimately result in losing his license and explained the procedure to obtain his license back.    Pain is controlled at this time with his current regimen.  I did discuss with him the likelihood of weaning some of these meds in the near future due to the significant interactions and side effects.  He is open to weaning as needed however is fearful of his pain.    Multiple myeloma: We will need to follow-up with hepatology for plan of care.    Weakness: Continue with therapies and anticipate discharge to home in the next week.    Drug-induced constipation: Resolved continue with MiraLAX daily.    Vitamin D has been restarted due to compression fracture.      45 total minutes spent with 30 minutes spent face-to-face with patient counseling coordination of the following information below.  I did explain to the patient that at this time it is not recommended to treat people prophylactically with hydroxychloroquine in this regional area.  I explained  that I would not order that at this time and would also not recommend it.  He could consider vitamin C and zinc however he did not answer when I asked him if he wanted this.  He proceeded to tell me that there is good research showing that hydroxychloroquine use prophylactically is successful.  I again reinforced there are no sure clinical trials would not recommend starting a medication like hydroxychloroquine due to side effects.        Electronically signed by: eJssica Evangelista, CNP                denies pain/discomfort

## 2021-06-15 ENCOUNTER — RESULT REVIEW (OUTPATIENT)
Age: 71
End: 2021-06-15

## 2021-06-15 ENCOUNTER — NON-APPOINTMENT (OUTPATIENT)
Age: 71
End: 2021-06-15

## 2021-06-15 ENCOUNTER — OUTPATIENT (OUTPATIENT)
Dept: OUTPATIENT SERVICES | Facility: HOSPITAL | Age: 71
LOS: 1 days | Discharge: HOME | End: 2021-06-15
Payer: MEDICARE

## 2021-06-15 VITALS — HEIGHT: 60 IN | WEIGHT: 225.75 LBS

## 2021-06-15 DIAGNOSIS — C50.512 MALIGNANT NEOPLASM OF LOWER-OUTER QUADRANT OF LEFT FEMALE BREAST: ICD-10-CM

## 2021-06-15 DIAGNOSIS — Z96.652 PRESENCE OF LEFT ARTIFICIAL KNEE JOINT: Chronic | ICD-10-CM

## 2021-06-15 DIAGNOSIS — Z01.818 ENCOUNTER FOR OTHER PREPROCEDURAL EXAMINATION: ICD-10-CM

## 2021-06-15 DIAGNOSIS — Z90.89 ACQUIRED ABSENCE OF OTHER ORGANS: Chronic | ICD-10-CM

## 2021-06-15 LAB
A1C WITH ESTIMATED AVERAGE GLUCOSE RESULT: 7 % — HIGH (ref 4–5.6)
ALBUMIN SERPL ELPH-MCNC: 4.5 G/DL — SIGNIFICANT CHANGE UP (ref 3.5–5.2)
ALP SERPL-CCNC: 79 U/L — SIGNIFICANT CHANGE UP (ref 30–115)
ALT FLD-CCNC: 14 U/L — SIGNIFICANT CHANGE UP (ref 0–41)
ANION GAP SERPL CALC-SCNC: 11 MMOL/L — SIGNIFICANT CHANGE UP (ref 7–14)
APTT BLD: 30.4 SEC — SIGNIFICANT CHANGE UP (ref 27–39.2)
AST SERPL-CCNC: 19 U/L — SIGNIFICANT CHANGE UP (ref 0–41)
BASOPHILS # BLD AUTO: 0.05 K/UL — SIGNIFICANT CHANGE UP (ref 0–0.2)
BASOPHILS NFR BLD AUTO: 0.7 % — SIGNIFICANT CHANGE UP (ref 0–1)
BILIRUB SERPL-MCNC: 0.3 MG/DL — SIGNIFICANT CHANGE UP (ref 0.2–1.2)
BUN SERPL-MCNC: 19 MG/DL — SIGNIFICANT CHANGE UP (ref 10–20)
CALCIUM SERPL-MCNC: 10.7 MG/DL — HIGH (ref 8.5–10.1)
CHLORIDE SERPL-SCNC: 100 MMOL/L — SIGNIFICANT CHANGE UP (ref 98–110)
CO2 SERPL-SCNC: 31 MMOL/L — SIGNIFICANT CHANGE UP (ref 17–32)
CREAT SERPL-MCNC: 0.9 MG/DL — SIGNIFICANT CHANGE UP (ref 0.7–1.5)
EOSINOPHIL # BLD AUTO: 0.13 K/UL — SIGNIFICANT CHANGE UP (ref 0–0.7)
EOSINOPHIL NFR BLD AUTO: 1.7 % — SIGNIFICANT CHANGE UP (ref 0–8)
ESTIMATED AVERAGE GLUCOSE: 154 MG/DL — HIGH (ref 68–114)
GLUCOSE SERPL-MCNC: 114 MG/DL — HIGH (ref 70–99)
HCT VFR BLD CALC: 40.6 % — SIGNIFICANT CHANGE UP (ref 37–47)
HGB BLD-MCNC: 12.4 G/DL — SIGNIFICANT CHANGE UP (ref 12–16)
IMM GRANULOCYTES NFR BLD AUTO: 0.3 % — SIGNIFICANT CHANGE UP (ref 0.1–0.3)
INR BLD: 0.96 RATIO — SIGNIFICANT CHANGE UP (ref 0.65–1.3)
LYMPHOCYTES # BLD AUTO: 2.53 K/UL — SIGNIFICANT CHANGE UP (ref 1.2–3.4)
LYMPHOCYTES # BLD AUTO: 34 % — SIGNIFICANT CHANGE UP (ref 20.5–51.1)
MCHC RBC-ENTMCNC: 25.2 PG — LOW (ref 27–31)
MCHC RBC-ENTMCNC: 30.5 G/DL — LOW (ref 32–37)
MCV RBC AUTO: 82.4 FL — SIGNIFICANT CHANGE UP (ref 81–99)
MONOCYTES # BLD AUTO: 0.64 K/UL — HIGH (ref 0.1–0.6)
MONOCYTES NFR BLD AUTO: 8.6 % — SIGNIFICANT CHANGE UP (ref 1.7–9.3)
NEUTROPHILS # BLD AUTO: 4.08 K/UL — SIGNIFICANT CHANGE UP (ref 1.4–6.5)
NEUTROPHILS NFR BLD AUTO: 54.7 % — SIGNIFICANT CHANGE UP (ref 42.2–75.2)
NRBC # BLD: 0 /100 WBCS — SIGNIFICANT CHANGE UP (ref 0–0)
PLATELET # BLD AUTO: 213 K/UL — SIGNIFICANT CHANGE UP (ref 130–400)
POTASSIUM SERPL-MCNC: 3.7 MMOL/L — SIGNIFICANT CHANGE UP (ref 3.5–5)
POTASSIUM SERPL-SCNC: 3.7 MMOL/L — SIGNIFICANT CHANGE UP (ref 3.5–5)
PROT SERPL-MCNC: 8 G/DL — SIGNIFICANT CHANGE UP (ref 6–8)
PROTHROM AB SERPL-ACNC: 11 SEC — SIGNIFICANT CHANGE UP (ref 9.95–12.87)
RBC # BLD: 4.93 M/UL — SIGNIFICANT CHANGE UP (ref 4.2–5.4)
RBC # FLD: 14.8 % — HIGH (ref 11.5–14.5)
SODIUM SERPL-SCNC: 142 MMOL/L — SIGNIFICANT CHANGE UP (ref 135–146)
WBC # BLD: 7.45 K/UL — SIGNIFICANT CHANGE UP (ref 4.8–10.8)
WBC # FLD AUTO: 7.45 K/UL — SIGNIFICANT CHANGE UP (ref 4.8–10.8)

## 2021-06-15 PROCEDURE — 71046 X-RAY EXAM CHEST 2 VIEWS: CPT | Mod: 26

## 2021-06-15 PROCEDURE — 93010 ELECTROCARDIOGRAM REPORT: CPT | Mod: NC

## 2021-06-15 NOTE — H&P PST ADULT - LYMPH NODES
Pt c/o lower back pain x2 weeks. 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 Also c/o dysuria, urinary urgency. Pain is 7/10. Denies taking meds PTA  
No lymphadedenopathy

## 2021-06-15 NOTE — H&P PST ADULT - NSICDXPASTMEDICALHX_GEN_ALL_CORE_FT
PAST MEDICAL HISTORY:  DM (diabetes mellitus)     Dyslipidemia     Hypertension     Osteoarthritis

## 2021-06-15 NOTE — H&P PST ADULT - REASON FOR ADMISSION
Mansi Eulalia is a 71 yo F presents to [facility] for Left breast wide local excision with radiofrequency Localizer sentinel node mapping and biopsy possible axillary node dissection under GA on 06/29/2021 at [facility] OR by Dr. Arabella Blake.  Covid testing on 06/26/2021 at 12:20 pm

## 2021-06-15 NOTE — H&P PST ADULT - NSANTHOSAYNRD_GEN_A_CORE
No. GEOVANI screening performed.  STOP BANG Legend: 0-2 = LOW Risk; 3-4 = INTERMEDIATE Risk; 5-8 = HIGH Risk

## 2021-06-15 NOTE — H&P PST ADULT - HISTORY OF PRESENT ILLNESS
Patient with PMH of DM, HTN, HL, Covid 19, Obesity, Left TKR and left breast lumbectomy presents to PAST for the above procedure due to Left breast cancer.   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  - The carcinoma demonstrates by immunohistochemical stains (as  per the submitted report):  Estrogen receptor (SP1): positive (21-30% weak intensity)  Progesterone receptor (1E2): negative (<1% nuclear  staining)  HER2 (4B5): negative (score 1+);  Proliferation index Ki-67 (30-9): 85%.  Patient denies any c/o cp, sob. palpitations fever, cough or dysuria. Patient ambulates with cane and Ex tolerance of 1 fos walks with out SOB.   Patient was diagnosed with .covid on 10/2020.covid  Patient has appointment for repeat covid testing pre op and instructed to continue to self monitor and report any concerns to MD. Pt will continue to practice self isolation and  exposure control measures pre op.  Anesthesia Alert  Class IV Airway  NO--History of neck surgery or radiation  NO--Limited ROM of neck  NO--History of Malignant hyperthermia  NO--Personal or family history of Pseudocholinesterase deficiency  NO--Prior Anesthesia Complication  NO--Latex Allergy  Dentures Top and Bottom   NO--History of Rheumatoid Arthritis  NO--GEOVANI  NO-Bleeding disorder

## 2021-06-16 ENCOUNTER — NON-APPOINTMENT (OUTPATIENT)
Age: 71
End: 2021-06-16

## 2021-06-19 ENCOUNTER — RESULT REVIEW (OUTPATIENT)
Age: 71
End: 2021-06-19

## 2021-06-19 ENCOUNTER — OUTPATIENT (OUTPATIENT)
Dept: OUTPATIENT SERVICES | Facility: HOSPITAL | Age: 71
LOS: 1 days | Discharge: HOME | End: 2021-06-19
Payer: MEDICARE

## 2021-06-19 DIAGNOSIS — Z90.89 ACQUIRED ABSENCE OF OTHER ORGANS: Chronic | ICD-10-CM

## 2021-06-19 DIAGNOSIS — C50.912 MALIGNANT NEOPLASM OF UNSPECIFIED SITE OF LEFT FEMALE BREAST: ICD-10-CM

## 2021-06-19 DIAGNOSIS — Z80.3 FAMILY HISTORY OF MALIGNANT NEOPLASM OF BREAST: ICD-10-CM

## 2021-06-19 DIAGNOSIS — Z96.652 PRESENCE OF LEFT ARTIFICIAL KNEE JOINT: Chronic | ICD-10-CM

## 2021-06-19 PROCEDURE — 77049 MRI BREAST C-+ W/CAD BI: CPT | Mod: 26

## 2021-06-25 ENCOUNTER — OUTPATIENT (OUTPATIENT)
Dept: OUTPATIENT SERVICES | Facility: HOSPITAL | Age: 71
LOS: 1 days | Discharge: HOME | End: 2021-06-25
Payer: MEDICARE

## 2021-06-25 ENCOUNTER — RESULT REVIEW (OUTPATIENT)
Age: 71
End: 2021-06-25

## 2021-06-25 DIAGNOSIS — Z96.652 PRESENCE OF LEFT ARTIFICIAL KNEE JOINT: Chronic | ICD-10-CM

## 2021-06-25 DIAGNOSIS — Z90.89 ACQUIRED ABSENCE OF OTHER ORGANS: Chronic | ICD-10-CM

## 2021-06-25 PROBLEM — E11.9 TYPE 2 DIABETES MELLITUS WITHOUT COMPLICATIONS: Chronic | Status: ACTIVE | Noted: 2021-06-15

## 2021-06-25 PROCEDURE — 19281 PERQ DEVICE BREAST 1ST IMAG: CPT | Mod: LT

## 2021-06-26 ENCOUNTER — LABORATORY RESULT (OUTPATIENT)
Age: 71
End: 2021-06-26

## 2021-06-26 ENCOUNTER — OUTPATIENT (OUTPATIENT)
Dept: OUTPATIENT SERVICES | Facility: HOSPITAL | Age: 71
LOS: 1 days | Discharge: HOME | End: 2021-06-26

## 2021-06-26 DIAGNOSIS — Z11.59 ENCOUNTER FOR SCREENING FOR OTHER VIRAL DISEASES: ICD-10-CM

## 2021-06-26 DIAGNOSIS — Z96.652 PRESENCE OF LEFT ARTIFICIAL KNEE JOINT: Chronic | ICD-10-CM

## 2021-06-26 DIAGNOSIS — Z90.89 ACQUIRED ABSENCE OF OTHER ORGANS: Chronic | ICD-10-CM

## 2021-06-29 ENCOUNTER — RESULT REVIEW (OUTPATIENT)
Age: 71
End: 2021-06-29

## 2021-06-29 ENCOUNTER — APPOINTMENT (OUTPATIENT)
Dept: BREAST CENTER | Facility: AMBULATORY SURGERY CENTER | Age: 71
End: 2021-06-29
Payer: MEDICARE

## 2021-06-29 ENCOUNTER — OUTPATIENT (OUTPATIENT)
Dept: OUTPATIENT SERVICES | Facility: HOSPITAL | Age: 71
LOS: 1 days | Discharge: HOME | End: 2021-06-29
Payer: MEDICARE

## 2021-06-29 VITALS
DIASTOLIC BLOOD PRESSURE: 67 MMHG | WEIGHT: 225.75 LBS | TEMPERATURE: 99 F | SYSTOLIC BLOOD PRESSURE: 143 MMHG | OXYGEN SATURATION: 98 % | HEART RATE: 68 BPM | RESPIRATION RATE: 20 BRPM | HEIGHT: 60 IN

## 2021-06-29 VITALS
HEART RATE: 98 BPM | RESPIRATION RATE: 27 BRPM | OXYGEN SATURATION: 97 % | SYSTOLIC BLOOD PRESSURE: 117 MMHG | DIASTOLIC BLOOD PRESSURE: 57 MMHG

## 2021-06-29 DIAGNOSIS — Z90.89 ACQUIRED ABSENCE OF OTHER ORGANS: Chronic | ICD-10-CM

## 2021-06-29 DIAGNOSIS — Z96.652 PRESENCE OF LEFT ARTIFICIAL KNEE JOINT: Chronic | ICD-10-CM

## 2021-06-29 PROCEDURE — 38900 IO MAP OF SENT LYMPH NODE: CPT | Mod: LT

## 2021-06-29 PROCEDURE — 78195 LYMPH SYSTEM IMAGING: CPT | Mod: 26,MH

## 2021-06-29 PROCEDURE — 88342 IMHCHEM/IMCYTCHM 1ST ANTB: CPT | Mod: 26,59

## 2021-06-29 PROCEDURE — 38525 BIOPSY/REMOVAL LYMPH NODES: CPT | Mod: LT

## 2021-06-29 PROCEDURE — 88307 TISSUE EXAM BY PATHOLOGIST: CPT | Mod: 26

## 2021-06-29 PROCEDURE — 19301 PARTIAL MASTECTOMY: CPT | Mod: LT

## 2021-06-29 PROCEDURE — 88305 TISSUE EXAM BY PATHOLOGIST: CPT | Mod: 26

## 2021-06-29 PROCEDURE — 88341 IMHCHEM/IMCYTCHM EA ADD ANTB: CPT | Mod: 26,59

## 2021-06-29 PROCEDURE — 88360 TUMOR IMMUNOHISTOCHEM/MANUAL: CPT | Mod: 26

## 2021-06-29 RX ORDER — IBUPROFEN 200 MG
1 TABLET ORAL
Qty: 21 | Refills: 0
Start: 2021-06-29 | End: 2021-07-05

## 2021-06-29 RX ORDER — ONDANSETRON 8 MG/1
4 TABLET, FILM COATED ORAL ONCE
Refills: 0 | Status: DISCONTINUED | OUTPATIENT
Start: 2021-06-29 | End: 2021-07-13

## 2021-06-29 RX ORDER — SODIUM CHLORIDE 9 MG/ML
1000 INJECTION, SOLUTION INTRAVENOUS
Refills: 0 | Status: DISCONTINUED | OUTPATIENT
Start: 2021-06-29 | End: 2021-07-13

## 2021-06-29 RX ORDER — HYDROMORPHONE HYDROCHLORIDE 2 MG/ML
0.5 INJECTION INTRAMUSCULAR; INTRAVENOUS; SUBCUTANEOUS
Refills: 0 | Status: DISCONTINUED | OUTPATIENT
Start: 2021-06-29 | End: 2021-06-29

## 2021-06-29 RX ADMIN — SODIUM CHLORIDE 75 MILLILITER(S): 9 INJECTION, SOLUTION INTRAVENOUS at 15:17

## 2021-06-29 NOTE — ASU DISCHARGE PLAN (ADULT/PEDIATRIC) - ASU DC SPECIAL INSTRUCTIONSFT
SURGERY DISCHARGE INSTRUCTIONS    FOLLOW-UP - with Dr. Bell in 2 weeks. Call the office to make an appointment or if you have any questions/concerns.    ACTIVITY- No heavy lifting.    WOUNDCARE - You have no bandages but have purple glue over your incisions instead, this will come off with time. May shower 24 hours after surgery but no submerging wound under water for 2 weeks (tub bathing). Pat area dry when wet, keep clean and dry. Do not apply powders or lotion to wound area.     PAIN MEDS - You have been prescribed ibuprofen 800 mg every 8 hours for pain. Please take Tylenol extra strength every 6 hours for pain.

## 2021-06-29 NOTE — BRIEF OPERATIVE NOTE - OPERATION/FINDINGS
left sentinel nodes: #1 (hot and blue, 3064 counts); #2: 460 counts; #3: 1149 counts; #4: 408 counts   left breast mass, additional margin: anterior, inferior, posterior, medial, lateral, superior; new posterior margin is fascia

## 2021-06-29 NOTE — ASU DISCHARGE PLAN (ADULT/PEDIATRIC) - FOLLOW UP APPOINTMENTS
[facility],  Endoscopy/Ambulatory Surgery [facility]:  [facility]

## 2021-06-29 NOTE — ASU DISCHARGE PLAN (ADULT/PEDIATRIC) - CARE PROVIDER_API CALL
Lou Bell (MD)  Surgery  [place] [facility], [address] [place] [address]  Phone: [phone]  Fax: [phone]  Follow Up Time: 2 weeks

## 2021-06-29 NOTE — CHART NOTE - NSCHARTNOTEFT_GEN_A_CORE
PACU ANESTHESIA ADMISSION NOTE      Procedure: Lumpectomy of left breast with sentinel node biopsy      Post op diagnosis:  Breast cancer, left        ____  Intubated  TV:______       Rate: ______      FiO2: ______    _x___  Patent Airway    _x___  Full return of protective reflexes    _x___  Full recovery from anesthesia / back to baseline status    Vitals:    See anesthesia record      Mental Status:  _x___ Awake   _____ Alert   _____ Drowsy   _____ Sedated    Nausea/Vomiting:  _x___  NO       ______Yes,   See Post - Op Orders         Pain Scale (0-10):  __0___    Treatment: _x___ None    ____ See Post - Op/PCA Orders    Post - Operative Fluids:   __x__ Oral   ____ See Post - Op Orders    Plan: Discharge:   _x___Home       _____Floor     _____Critical Care    _____  Other:_________________    Comments:  No anesthesia issues or complications noted.  Discharge when criteria met.

## 2021-06-29 NOTE — BRIEF OPERATIVE NOTE - SPECIMENS
left sentinel nodes: #1 (hot and blue, 3064 counts); #2: 460 counts; #3: 1149 counts; #4: 408 counts; left breast mass, additional margin: anterior, inferior, posterior, medial, lateral, superior; new posterior margin is fascia

## 2021-07-05 DIAGNOSIS — E78.5 HYPERLIPIDEMIA, UNSPECIFIED: ICD-10-CM

## 2021-07-05 DIAGNOSIS — Z79.84 LONG TERM (CURRENT) USE OF ORAL HYPOGLYCEMIC DRUGS: ICD-10-CM

## 2021-07-05 DIAGNOSIS — C50.912 MALIGNANT NEOPLASM OF UNSPECIFIED SITE OF LEFT FEMALE BREAST: ICD-10-CM

## 2021-07-05 DIAGNOSIS — I10 ESSENTIAL (PRIMARY) HYPERTENSION: ICD-10-CM

## 2021-07-05 DIAGNOSIS — E11.9 TYPE 2 DIABETES MELLITUS WITHOUT COMPLICATIONS: ICD-10-CM

## 2021-07-05 DIAGNOSIS — M19.90 UNSPECIFIED OSTEOARTHRITIS, UNSPECIFIED SITE: ICD-10-CM

## 2021-07-06 DIAGNOSIS — C50.912 MALIGNANT NEOPLASM OF UNSPECIFIED SITE OF LEFT FEMALE BREAST: ICD-10-CM

## 2021-07-07 LAB — SURGICAL PATHOLOGY STUDY: SIGNIFICANT CHANGE UP

## 2021-07-12 ENCOUNTER — APPOINTMENT (OUTPATIENT)
Dept: BREAST CENTER | Facility: CLINIC | Age: 71
End: 2021-07-12
Payer: MEDICARE

## 2021-07-12 VITALS
SYSTOLIC BLOOD PRESSURE: 150 MMHG | BODY MASS INDEX: 43.19 KG/M2 | HEIGHT: 60 IN | WEIGHT: 220 LBS | TEMPERATURE: 98.7 F | DIASTOLIC BLOOD PRESSURE: 90 MMHG

## 2021-07-12 PROCEDURE — 99024 POSTOP FOLLOW-UP VISIT: CPT

## 2021-07-13 NOTE — ASSESSMENT
[FreeTextEntry1] : Eulalia is a 70 F with a left breast IDC,  ER weakly positive, HI neg, Her 2 neg, [id number], stage 1 breast cancer, s/p L WLE and SLN Bx on 6/29/2021. \par \par 6/29/2021 -- L WLE and SLN Bx \par 1. L breast mass @3N10 \par -IDC, poorly differentiated, medullary features \par -no intraductal component\par -no LVI \par -ER weakly positive, HI neg, Her 2 neg\par -T = 1.1 cm \par -closest margins was 0.5mm from superior margin and 1 mm from anterior margin\par -additional margins were all negative for carcinoma (superior, inferior, anterior, medial, lateral, posterior) \par \par 2. L axillary sentinel lymph node biopsy \par -0/6 LN positive for mets \par -[id number]\par \par ON exam, both surgical incisions are healing well without any signs of infection, hematoma or significant seroma formation.  \par \par Her final pathology revealed a 1.1 cm IDC, poorly differentiated, with medullary features, ER weakly positive, HI neg, Her 2 neg, no LVI, no intraductal component, negative surgical margins and 6 benign left axillary sentinel lymph nodes for a [id number] breast cancer. \par \par NO further surgical intervention is indicated at this time. \par She will be due for a L dx mammogram on 12/29/2021.  THis will be scheduled for her today.  She can follow up after for a CBE. \par \par She will be referred to medical oncology and radiation oncology for evaluation of adjuvant treatments. \par \par AS REVIEW: \par Her most recent imaging was a b/l screening mammogram and subsequent L dx mammogram and US On 4/29/2021 and 5/14/2021 which revealed @3N10, in her left breast, her biopsy proven cancer, measuring 8 x 7 x 13 mm. \par \par We have discussed her new diagnosis of breast cancer.  She is currently a stage 1 breast cancer, based off AJCC 8th edition.  We discussed her surgical and other treatment options at this time. \par \par In regards to her left sided breast cancer, she is a great candidate for either breast conservation surgery or a mastectomy.  Her lesion is located @3N10   based off the US so she is a candidate for a nipple sparing mastectomy.  However, there is no difference in survival between a lumpectomy + radiation therapy and a mastectomy.  However the rate of local recurrence is slightly higher with the lumpectomy. The rate of recurrence with a mastectomy is not zero, however, and is likely closer to 4-9%.  \par \par Regardless of if she chooses a mastectomy or a lumpectomy, she would require an evaluation of her axillary lymph nodes via a sentinel lymph node biopsy.  This is done by injecting the perioareolar breast tissue with dye prior to the surgery and removing 1-5 lymph nodes that are the first to drain the breast.  \par \par At this time, she is leaning towards undergoing breast conservation therapy with a LUMPECTOMY.  Her left breast lesion is not palpable on clinical exam, so she will need a radiofrequency guided lumpectomy on the left breast.  In the interim, we will obtain a bilateral breast MRI to further evaluate the extent of her disease. \par \par We did briefly discuss the different radiation options.  If she got a mastectomy, she would likely only need radiation therapy if she had a lot of positive margins. If she undergoes a lumpectomy, she is a candidate for both partial breast irradiation and whole breast irradiation.  We briefly discussed the differences between these two modalities.  \par \par In regards to systemic therapy, we briefly discussed both chemotherapy and endocrine therapy. If the tumor is larger than 1 cm and her 2 negative, we would likely need to send an additional study on her tumor sample, called an oncotype DX to make the determination regarding if chemotherapy would help her.  At the completion of all these treatments, she should get endocrine therapy, at current time she would need an AI, for a total of at least 5 years, although her ER is only weakly positive so she may not derive as much benefit from the endocrine therapy.  \par \par Per ASBrS consensus guidelines, any patient with a diagnosis of breast cancer should be offered panel genetic testing as 10% of these patients were found to have a mutation. In addition, her mother also had breast cancer, diagnosed at age 52. THis was offered to her and she would like to proceed with panel genetic testing.  Her blood was drawn today.\par \par All of her questions were answered.  She knows to call with any further questions or concerns.\par \par PLAN\par -SOZO: 23.5 --> 12.9 -- this is a change from her baseline >7.5 -- will refer to lymphedema specialist for prevention measures \par -INVITAE panel genetic testing -- blood drawn -- results pending\par -med onc referral, oncotype \par -rad onc referral \par -L dx mammogram on 12/29/2021 \par -f/up after

## 2021-07-13 NOTE — PHYSICAL EXAM
[Normocephalic] : normocephalic [Atraumatic] : atraumatic [EOMI] : extra ocular movement intact [No Supraclavicular Adenopathy] : no supraclavicular adenopathy [No Cervical Adenopathy] : no cervical adenopathy [No dominant masses] : no dominant masses in right breast  [No dominant masses] : no dominant masses left breast [No Nipple Retraction] : no left nipple retraction [No Nipple Discharge] : no left nipple discharge [No Axillary Lymphadenopathy] : no left axillary lymphadenopathy [Soft] : abdomen soft [Not Tender] : non-tender [No Edema] : no edema [No Rashes] : no rashes [No Ulceration] : no ulceration [de-identified] : no suspicious abnormalities palpated within either breast, no lymphadenopathy  [de-identified] : both surgical incisions are healing well with dermabond still in place, no signs of infection, induration or erythema  [de-identified] : SOZO: 23.5 --> 12.9

## 2021-07-13 NOTE — HISTORY OF PRESENT ILLNESS
[FreeTextEntry1] : Eulalia is a 70 F with a left breast IDC,  ER weakly positive, ND neg, Her 2 neg, [id number], stage 1 breast cancer, s/p L WLE and SLN Bx on 2021. \par \par 2021 -- L WLE and SLN Bx \par 1. L breast mass @3N10 \par -IDC, poorly differentiated, medullary features \par -no intraductal component\par -no LVI \par -ER weakly positive, ND neg, Her 2 neg\par -T = 1.1 cm \par -closest margins was 0.5mm from superior margin and 1 mm from anterior margin\par -additional margins were all negative for carcinoma (superior, inferior, anterior, medial, lateral, posterior) \par \par 2. L axillary sentinel lymph node biopsy \par -0 LN positive for mets \par -[id number]\par \par Eulalia presented today with her friend, Silvia who was present for the entirety of the consultation. \par \par She has no breast related complaints at this time.  She denies any breast pain, has not palpated any new palpable masses in either breast and denies any nipple discharge or retraction.  \par \par Her work up was as follows: \par 2021 -- b/l screening mammogram \par -R: -no suspicious mass, microcalcifications or architectural distortion\par -L: in lateral breast, asymmetry present \par BIRADS 0 \par \par 2021 -- L dx mammogram and US \par -scattered areas of fibroglandular densities\par -L: @3:00, persistent mass \par L US \par -@3N10, hypoechoic mass, measures 8 x 7 x 13 mm --> BIOPSY \par BIRADS 4c \par \par 2021 -- L US CNBx @3N10 (coil clip) \par -IDC, SBR 3, poorly differentiated with associated lymphocytic reaction \par -ER weakly positive, ND neg (Toni 5/0), Her 2 neg \par \par HISTORICAL RISK FACTORS: \par -no prior breast biopsies, history of L breast lumpectomy for a cyst in the  \par -family history of breast cancer in her mother at age 52\par -, age at first live birth was 22 \par -prior OCP use x 15 years, stopped >30 years prior \par -no gyn surgeries\par \par INTERVAL HISTORY: \par Eulalia returns for her FIRST POST OP VISIT, s/p L WLE and SLN Bx on 2021. \par \par Her final pathology revealed a 1.1 cm IDC, poorly differentiated, with medullary features, ER weakly positive, ND neg, Her 2 neg, no LVI, no intraductal component, negative surgical margins and 6 benign left axillary sentinel lymph nodes for a [id number] breast cancer. \par \par She is healing well from her recent surgery.  She has some soreness at her surgical site, but denies any pain, redness, fevers or chills.

## 2021-07-13 NOTE — DATA REVIEWED
[FreeTextEntry1] : EXAM:  MR BREAST WAW IC BI\par \par \par PROCEDURE DATE:  06/19/2021\par \par \par \par \par INTERPRETATION:  Clinical history/reason for exam: Recent diagnosis of invasive ductal carcinoma in the left breast. Evaluation for extent of disease.\par \par Technique: Breast MRI is performed at 1.5 T with the patient prone and the breasts in a dedicated breast coil. Following a 3 plane localizer, sagittal T1 weighted, fat-saturated T1 weighted and fat saturated T2-weighted sequence; dynamic contrast enhanced sagittal images; and delayed post-contrast axial fat-saturated T1 weighted images were obtained. 10 mL gadolinium contrast was injected and 0 mL was discarded. Subtraction and MIP images were reviewed.  Digitwhiz software was used in interpretation.\par \par Comparison: None available. Correlation is made with 5/14/2021 mammogram\par \par Findings:\par \par Amount of fibroglandular tissue: Scattered fibroglandular tissue\par \par Background parenchymal enhancement: Mild, Symmetric\par \par Left breast:\par \par At approximately 3:00 approximately 7 cm to the nipple, there is a signal void with adjacent enhancement. This corresponds to the region of recent biopsy which revealed invasive ductal carcinoma. The overall AP span of enhancement is approximately 1.3 cm. Part of this is likely secondary to artifactual blooming. No other regions of abnormal enhancement are identified in the left breast or axilla.\par \par \par Right breast:\par \par No evidence of abnormal mass or nonmass enhancement in the right breast or axilla.\par \par Atelectatic changes are noted in the right middle lobe.\par \par \par Impression:\par \par \par Abnormal enhancement in the region of the biopsy as described above corresponds to the site of index cancer.\par \par No other areas of abnormal enhancement in either breast\par \par Recommendation: Management as clinically appropriate.\par \par BI-RADS Category 6: Known Biopsy-Proven Malignancy\par \par \par \par \par RIP NASH MD; Attending Radiologist\par This document has been electronically signed. Jun 21 2021  8:37AM\par \par [facility] [id number]\par \par GRAHAM STUBBS                          6\par \par \par \par Surgical Final Report\par \par \par \par \par Final Diagnosis\par 1. Breast, left axillary sentinel lymph node #1, biopsy:\par - One benign lymph node (0/1). Negative for carcinoma with\par evaluation of multiple H T E levels and with negative\par immunohistochemical stains for cytokeratins (CK AE1/AE3 and CK\par 8/18).\par \par 2. Breast, left axillary sentinel lymph node #2, biopsy:\par - One benign lymph node (0/1). Negative for carcinoma with\par evaluation of multiple H T E levels and with negative\par immunohistochemical stains for cytokeratins (CK AE1/AE3 and CK\par 8/18).\par \par 3. Breast, left axillary sentinel lymph node #3, biopsy:\par - One benign lymph node (0/1). Negative for carcinoma with\par evaluation of multiple H T E levels and with negative\par immunohistochemical stains for cytokeratins (CK AE1/AE3 and CK\par 8/18).\par \par 4. Breast, left axillary sentinel lymph node #4, biopsy:\par - One benign lymph node (0/1). Negative for carcinoma with\par evaluation of multiple H T E levels and with negative\par immunohistochemical stains for cytokeratins (CK AE1/AE3 and CK\par 8/18).\par \par 5. Breast, left axillary sentinel lymph node #5, biopsy:\par - Two benign lymph nodes (0/2). Negative for carcinoma with\par evaluation of multiple H T E levels and with negative\par immunohistochemical stains for cytokeratins (CK AE1/AE3 and CK\par 8/18).\par \par 6. Breast, left 3 N10 mass, radio frequency seed localized\par lumpectomy:\par - Healing prior biopsy site with invasive poorly differentiated\par ductal carcinoma, 1.1 cm (microscopic measurement), with\par medullary features (a partial pushing border, a dominant solid\par syncytial architectural growth pattern, tumoral necrosis, and an\par associated diffuse chronic lymphoplasmacytic cell inflammatory\par infiltrate).\par - No lymphovascular invasion nor intraductal component is\par present.\par - The carcinoma is located 0.5 mm and 1.0 mm from the nearest\par inked and designated superior and anterior surgical margins\par respectively (microscopic measurements).\par - Surrounding non-lesional atrophic fatty breast tissue without\par histopathologic abnormality.\par - Pending special studies for ER/ID proteins, proliferation\par index (Ki-67),\par \par \par \par \par \par GRAHAM STUBBS                          6\par \par \par \par Surgical Final Report\par \par \par \par \par and HER2/TREVOR oncoprotein expression and/or gene amplification,\par with results to be reported as a separate addendum.\par - AJCC 8th Edition Pathologic Stage: pT1c, p(sn)N0, pMx.\par \par 7. Breast, left superior margin, excision:\par - Benign atrophic fatty breast tissue without histopathologic\par abnormality. Negative for carcinoma.\par \par 8. Breast, left inferior margin, excision:\par - Benign fibroadipose connective tissue without histopathologic\par abnormality.\par - No glandular breast tissue is present in this entirely\par submitted specimen.\par \par 9. Breast, left anterior margin, excision:\par - Benign atrophic fatty breast tissue with focal healing prior\par biopsy site changes and mild vascular medial calcific sclerosis\par (Monckeberg's). Negative for carcinoma.\par \par 10. Breast, left medial margin, excision:\par - Benign atrophic fatty breast tissue without histopathologic\par abnormality. Negative for carcinoma.\par \par 11. Breast, left lateral margin, excision:\par - Benign fibroadipose connective tissue without histopathologic\par abnormality.\par - No glandular breast tissue is present in this entirely\par submitted specimen.\par \par 12. Breast, left posterior margin, excision:\par - Benign fibroadipose connective tissue without histopathologic\par abnormality.\par - No glandular breast tissue is present in this entirely\par submitted specimen.\par \par Verified by: Aubrey Ulloa M.D.\par (Electronic Signature)\par Reported on: 07/07/21 15:50 EDT, [address] [facility] [address]\par Phone: [phone]   Fax: [phone]\par _________________________________________________________________\par \par Clinical History\par Left WLE, SLN biopsy\par COVID (-)\par \par Specimen(s) Submitted\par 1     Left breast sentinel lymph node #1\par \par \par \par \par \par GRAHAM STUBBS                          6\par \par \par \par Surgical Final Report\par \par \par \par \par Time obtained: 12:22 pm\par 2     Left breast sentinel lymph node #2\par Time obtained: 12:39 pm\par 3     Left breast sentinel lymph node #3\par Time obtained: 12:45 pm\par 4     Left breast sentinel lymph node #4\par Time obtained: 12:56 pm\par 5     Left breast sentinel lymph node #5\par Time obtained: 1:11 pm\par 6     Left breast mass\par Time obtained: 1:44 pm\par 7     Left breast superior margin\par Time obtained: 1:50 pm\par 8     Left breast inferior margin\par Time obtained: 1:53 pm\par 9     Left breast anterior margin\par Time obtained: 2:55 pm\par 10    left breast medial margin\par Time obtained: 1:55 pm\par 11    Left breast lateral margin\par Time obtained: 2:06 pm\par 12    Left breast posterior margin\par Time obtained: 2:10 pm\par \par Gross Description\par 1. The specimen is received fresh, labeled "left breast sentinel\par node #1" and consists of a fragment of fibrofatty tissue,\par measuring 2.5 x 1.5 x 1.0 cm with one probable lymph node,\par measuring 2 x 1 x 1 cm. The lymph node is serially sectioned to\par reveal grossly unremarkable tan gray surface. The specimen is\par submitted entirely.\par \par Summary of Sections:\par 1A - Fatty tissue -1\par 1B-1C - Lymph node serially sectioned -2\par \par Total blocks - 3\par \par 2. The specimen is received fresh, labeled "left breast sentinel\par node #2" and consists of a fragment of fibrofatty tissue,\par measuring 1.5 x 0.8 x 0.5 cm with one probable lymph node,\par measuring 1 x 0.5 x 0.5 cm. The specimen is submitted entirely.\par \par Summary of Sections:\par 2A - Lymph node serially sectioned -1\par 2B - Remaining fat -1\par \par Total blocks - 2\par \par \par \par \par \par STUBBSGRAHAM                          6\par \par \par \par Surgical Final Report\par \par \par \par \par \par 3. The specimen is received fresh, labeled "left breast sentinel\par node #3" and consists of a lymph node, measuring 0.8 x 0.6 x 0.5\par cm. The specimen is serially sectioned to reveal grossly\par unremarkable surface. The specimen is submitted entirely. (1\par block)\par \par 4. The specimen is received fresh, labeled "left breast sentinel\par node #4" and consists of a lymph node, measuring 1 x 1 x 0.6 cm.\par The specimen is serially sectioned to reveal grossly unremarkable\par surface. The specimen is submitted entirely. (1 block)\par \par 5. The specimen is received fresh, labeled "left breast sentinel\par node #5" and consists of a fragment of fibrofatty tissue,\par measuring 2 x 1.5 x 1.0 cm with two probable lymph nodes,\par measuring 1.5 x 1.0 x 0.8 cm and 0.5 x 0.5 x 0.5 cm. The larger\par lymph node is serially sectioned and the smaller lymph node is\par bisected. The cut surfaces of both lymph nodes are grossly\par unremarkable. The specimen is submitted entirely.\par \par Summary of Sections:\par 5A - Larger lymph node -1\par 5B - Smaller lymph node -1\par 5C - Remaining fat -1\par \par Total blocks - 3\par \par 6. The specimen is received fresh, labeled "left breast mass,\par radio frequency localizer, single anterior, double lateral,\par triple superior" and consists of fibrofatty tissue, weighing 14\par gm and measuring 5 x 4 x 1.5 cm. The specimen is oriented by\par suture as follows: single - anterior, double - lateral and triple\par - superior. The specimen is inked as follows: superior - blue,\par inferior - green, lateral - yellow, medial - orange, anterior -\par red and posterior - black. The specimen is serially sectioned to\par reveal a well circumscribed tan white lesion, measuring 1.3 x 0.7\par x 0.5 cm and appears to grossly abutting both the anterior and\par posterior margins. The specimen is submitted entirely.\par \par Summary of Sections:\par 6A-6B - Lateral margin perpendicular -2\par 6C-6E - Cross sections -3\par 6F-6M - Serial sections from lateral to medial (6F-6G  one\par section, 6H-6I one section, 6J-6K                 one section,\par 6L-6M one section, 6J-6M contains lesion) -8\par 6N-6O - Medial margin perpendicular -2\par \par Total blocks - 15\par \par \par \par \par \par GRAHAM STUBBS                          6\par \par \par \par Surgical Final Report\par \par \par \par \par \par 7. The specimen is received fresh, labeled "left breast superior\par margin, stitch new superior margin" and consists of a fragment of\par tan yellow lobulated adipose tissue, weighing 1 gm and measuring\par 1.5 x 1.5 x 0.6 cm.  The sutured new margin is inked blue.  The\par opposite side is inked black. The cut surface appears uniform\par yellow.  The specimen is submitted entirely.\par (1 block)\par \par 8. The specimen is received fresh, labeled "left breast inferior\par margin, stitch new inferior margin" and consists of a fragment of\par tan yellow lobulated adipose tissue, weighing 2 gm and measuring\par 2 x 1.5 x 0.8 cm.  The sutured new margin is inked blue.  The\par opposite side is inked black. The cut surface appears uniform\par yellow.  The specimen is submitted entirely.\par (2 blocks)\par \par 9. The specimen is received fresh, labeled "left breast anterior\par margin, stitch new anterior margin" and consists of a fragment of\par tan yellow lobulated adipose tissue, weighing 7 gm and measuring\par 3.5 x 3.5 x 1cm.  The sutured new margin is inked blue.  The\par opposite side is inked black. The cut surface appears uniform\par yellow.  The specimen is submitted entirely.\par (6 blocks)\par \par 10. The specimen is received fresh, labeled "left breast medial\par margin, stitch new medial margin" and consists of a fragment of\par tan yellow lobulated adipose tissue, weighing 2 gm and measuring\par 2 x 2 x 0.8 cm.  The sutured new margin is inked blue.  The\par opposite side is inked black. The cut surface appears uniform\par yellow.  The specimen is submitted entirely.(2 blocks)\par \par 11. The specimen is received fresh, labeled "left breast lateral\par margin, stitch new lateral margin" and consists of a fragment of\par tan yellow lobulated adipose tissue, weighing 3 gm and measuring\par 3 x 2 x 1 cm.  The sutured new margin is inked blue.  The\par opposite side is inked black. The cut surface appears uniform\par yellow.  The specimen is submitted entirely.\par (3 blocks)\par \par 12. The specimen is received fresh, labeled "left breast\par posterior margin, stitch new posterior margin" and consists of a\par fragment of tan yellow lobulated adipose tissue, weighing 1 gm\par and measuring 1.5 x 1.0 x 0.5 cm.  The sutured new margin is\par inked blue.  The opposite side is inked black. The cut surface\par appears uniform yellow.  The specimen is submitted entirely.\par (1 block)\par \par \par \par \par \par \par STUBBSGRAHAM OSORIO                          6\par \par \par \par Surgical Final Report\par \par \par \par \par Specimen was received and underwent gross examination at [facility], [address] [place] [address],\[place] [id number].\par \par 06/30/2021 11:36:45 EDT rr\par \par Perioperative Diagnosis\par Left breast cancer\par \par

## 2021-07-13 NOTE — REVIEW OF SYSTEMS
[As Noted in HPI] : as noted in HPI [Negative] : Heme/Lymph [Skin Wound] : skin wound [Fever] : no fever [Chills] : no chills [Abn Vaginal Bleeding] : no unexplained vaginal bleeding [Skin Lesions] : no skin lesions [Breast Pain] : no breast pain [Breast Lump] : no breast lump

## 2021-07-30 ENCOUNTER — NON-APPOINTMENT (OUTPATIENT)
Age: 71
End: 2021-07-30

## 2021-08-03 ENCOUNTER — NON-APPOINTMENT (OUTPATIENT)
Age: 71
End: 2021-08-03

## 2021-08-11 ENCOUNTER — APPOINTMENT (OUTPATIENT)
Dept: HEMATOLOGY ONCOLOGY | Facility: CLINIC | Age: 71
End: 2021-08-11
Payer: MEDICARE

## 2021-08-11 VITALS
BODY MASS INDEX: 45.96 KG/M2 | HEIGHT: 59 IN | DIASTOLIC BLOOD PRESSURE: 73 MMHG | WEIGHT: 228 LBS | HEART RATE: 74 BPM | TEMPERATURE: 97.3 F | SYSTOLIC BLOOD PRESSURE: 145 MMHG

## 2021-08-11 PROCEDURE — 99205 OFFICE O/P NEW HI 60 MIN: CPT

## 2021-08-12 ENCOUNTER — NON-APPOINTMENT (OUTPATIENT)
Age: 71
End: 2021-08-12

## 2021-08-15 NOTE — CONSULT LETTER
[Dear  ___] : Dear  [unfilled], [Consult Letter:] : I had the pleasure of evaluating your patient, [unfilled]. [Please see my note below.] : Please see my note below. [Consult Closing:] : Thank you very much for allowing me to participate in the care of this patient.  If you have any questions, please do not hesitate to contact me. [Sincerely,] : Sincerely, [FreeTextEntry3] : Elvis Aj MD [DrChano  ___] : Dr. REMY

## 2021-08-15 NOTE — ASSESSMENT
[FreeTextEntry1] : 69 yo female has stage IA ([id number]) IDC of left breast, G3, ER/NM/Her-2 negative, s/p left breast lumpectomy and SLNB with negative margins.\par \par Recommendations:\par We had detailed discussion regarding to her diagnosis, stage, prognosis and systemic adjuvant therapy.\par Eulalia has early stage breast cancer with 1.1 cm tumor, G3 and high proliferation index. On biopsy specimen, the tumor was ER weakly positive, NM negative and Her-2 negative. However, upon re-testing on surgical specimen, her tumor is triple negative. The discordant ER status is likely due to sampling and heterogenetic expression. In light of poorly differentiated tumor with 90% Ki 67 index, the biologic feature of her tumor is more like triple negative breast cancer. We discussed biologic feature of triple negative breast cancer being more aggressive and associated with high risk for distant recurrence. Due to lacking surface biomarkers, targeted therapy such as endocrine therapy or Her-2 directed therapy are not applicable.  As per NCCN guideline, systemic adjuvant chemotherapy is recommended for triple negative breast cancer with tumor size 6 mm or above. \par \par We reviewed choice of chemotherapy regimens including dose dense Adriamycin and Cytoxan given every 2 weeks for 4 cycles followed by Taxol weekly x 12 (AC-T), Taxotere/Cytoxan every 3 weeks for 4 times (TC), and CMF every 3 weeks for 8 cycles. The side effects of chemotherapy were reviewed. Adriamycin may cause cardiomyopathy, decreased heart function and small risk for developing leukemia. Chemotherapy can cause bone marrow suppression, cytopenia, increased risk of infection, anemia, fatigue, alopecia, N/V/D, rash, infusional reaction and peripheral neuropathy. Dose-dense AC-T is more aggressive regimen and associated with high response rate but more side effects. Eulalia is in agreeable with chemotherapy but needs to think about which chemotherapy regimen to take. \par \par She will be referred for 2D Echo to evaluate LVEF. \par \par We discuss to have port placement for chemotherapy. Her peripheral iv access is not easy. \par \par We also discussed dignity cap to reduce alopecia. She is interested to know more information.\par \par Given her tumor was stained ER weakly positive, she will be given adjuvant endocrine therapy with Anastrozole following chemotherapy.\par \par She will need adjuvant breast radiotherapy. We will refer her to see a radiation oncologist for consultation. \par \par All questions were answered. She agreed with the plan. Nurse Navigator Dorie will help coordinate her schedule.\par \par Given she has triple negative breast cancer and a family h/o breast cancer, she had germ line genetic panel study. Will followup the result.\par \par \par

## 2021-08-15 NOTE — PHYSICAL EXAM
[Fully active, able to carry on all pre-disease performance without restriction] : Status 0 - Fully active, able to carry on all pre-disease performance without restriction [Normal] : affect appropriate [de-identified] : Status post left breast lumpectomy and SLNB. Surgical incisions are healing well. There is no palpable abnormality.

## 2021-08-15 NOTE — HISTORY OF PRESENT ILLNESS
[de-identified] : 71 yo female was referred by Dr. Bell for consultation of breast cancer. Eulalia had b/l screening mammo on 21 which showed asymmetry in the lateral left breast. On 21, left breast dx mammo and US showed hypoechoic mass, measures 8 x 7 x 13 mm  @3N10. \par \par On 21, US guided biopsy of left breast mass showed Invasive poorly differentiated ductal carcinoma with coagulative tumoral necrosis and an associated diffuse chronic\par lymphoplasmacytic cell inflammatory infiltrate, ER pos 21-30%, weak intensity, VA neg, her-2 neg, Ki 67 85%. \par \par On 21, she had b/l breast MRI which showed Abnormal enhancement in the region of the biopsy corresponding to the site of index cancer. No other areas of abnormal enhancement in either breast\par \par On 21, she underwent left 3 N10 mass, radio frequency seed localized lumpectomy. 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 No lymphovascular invasion nor intraductal component is present. The surgical margins are negative. 6 sentinel lymph nodes are negative for malignancy. AJCC 8th Edition Pathologic Stage: pT1c, p(sn)N0, pMx. Biomarker study were done on surgical specimen. ER, VA and Her-2 (FISH ratio 1.3) are all negative. Ki 67 is 90%. \par \par Eulalia recovered well from surgery. She is here today to discuss adjuvant systemic therapy. \par \par She has a family history of breast cancer in her mother mother diagnosed with breast cancer at 40's. Her brother had esophageal cancer passed away at 45. She had germ line genetic test. The result is pending. \par \par She is  and was menopause at 49. \par left knee replacement. chronic pain joints.

## 2021-08-16 NOTE — H&P PST ADULT - HEIGHT IN INCHES
She should have an appt with Dr Wiggins soon and should discuss with him. There is a risk of fetal growth restriction with stimulants in pregnancy and so I am not in favor of prescribing these if possible   0

## 2021-08-17 ENCOUNTER — NON-APPOINTMENT (OUTPATIENT)
Age: 71
End: 2021-08-17

## 2021-08-23 ENCOUNTER — OUTPATIENT (OUTPATIENT)
Dept: OUTPATIENT SERVICES | Facility: HOSPITAL | Age: 71
LOS: 1 days | Discharge: HOME | End: 2021-08-23

## 2021-08-23 ENCOUNTER — LABORATORY RESULT (OUTPATIENT)
Age: 71
End: 2021-08-23

## 2021-08-23 DIAGNOSIS — Z96.652 PRESENCE OF LEFT ARTIFICIAL KNEE JOINT: Chronic | ICD-10-CM

## 2021-08-23 DIAGNOSIS — Z90.89 ACQUIRED ABSENCE OF OTHER ORGANS: Chronic | ICD-10-CM

## 2021-08-23 DIAGNOSIS — Z11.59 ENCOUNTER FOR SCREENING FOR OTHER VIRAL DISEASES: ICD-10-CM

## 2021-08-25 ENCOUNTER — FORM ENCOUNTER (OUTPATIENT)
Age: 71
End: 2021-08-25

## 2021-08-26 ENCOUNTER — OUTPATIENT (OUTPATIENT)
Dept: OUTPATIENT SERVICES | Facility: HOSPITAL | Age: 71
LOS: 1 days | Discharge: HOME | End: 2021-08-26
Payer: MEDICARE

## 2021-08-26 DIAGNOSIS — Z96.652 PRESENCE OF LEFT ARTIFICIAL KNEE JOINT: Chronic | ICD-10-CM

## 2021-08-26 DIAGNOSIS — Z01.818 ENCOUNTER FOR OTHER PREPROCEDURAL EXAMINATION: ICD-10-CM

## 2021-08-26 DIAGNOSIS — Z90.89 ACQUIRED ABSENCE OF OTHER ORGANS: Chronic | ICD-10-CM

## 2021-08-26 PROCEDURE — 93306 TTE W/DOPPLER COMPLETE: CPT | Mod: 26

## 2021-08-27 ENCOUNTER — OUTPATIENT (OUTPATIENT)
Dept: OUTPATIENT SERVICES | Facility: HOSPITAL | Age: 71
LOS: 1 days | Discharge: HOME | End: 2021-08-27
Payer: MEDICARE

## 2021-08-27 VITALS
SYSTOLIC BLOOD PRESSURE: 138 MMHG | HEIGHT: 59 IN | OXYGEN SATURATION: 97 % | DIASTOLIC BLOOD PRESSURE: 59 MMHG | HEART RATE: 83 BPM | WEIGHT: 213.85 LBS | TEMPERATURE: 98 F | RESPIRATION RATE: 18 BRPM

## 2021-08-27 DIAGNOSIS — C50.512 MALIGNANT NEOPLASM OF LOWER-OUTER QUADRANT OF LEFT FEMALE BREAST: ICD-10-CM

## 2021-08-27 DIAGNOSIS — Z98.890 OTHER SPECIFIED POSTPROCEDURAL STATES: Chronic | ICD-10-CM

## 2021-08-27 DIAGNOSIS — Z90.89 ACQUIRED ABSENCE OF OTHER ORGANS: Chronic | ICD-10-CM

## 2021-08-27 DIAGNOSIS — Z01.818 ENCOUNTER FOR OTHER PREPROCEDURAL EXAMINATION: ICD-10-CM

## 2021-08-27 DIAGNOSIS — Z96.652 PRESENCE OF LEFT ARTIFICIAL KNEE JOINT: Chronic | ICD-10-CM

## 2021-08-27 LAB
ALBUMIN SERPL ELPH-MCNC: 4.6 G/DL — SIGNIFICANT CHANGE UP (ref 3.5–5.2)
ALP SERPL-CCNC: 74 U/L — SIGNIFICANT CHANGE UP (ref 30–115)
ALT FLD-CCNC: 16 U/L — SIGNIFICANT CHANGE UP (ref 0–41)
ANION GAP SERPL CALC-SCNC: 11 MMOL/L — SIGNIFICANT CHANGE UP (ref 7–14)
APTT BLD: 30.7 SEC — SIGNIFICANT CHANGE UP (ref 27–39.2)
AST SERPL-CCNC: 20 U/L — SIGNIFICANT CHANGE UP (ref 0–41)
BASOPHILS # BLD AUTO: 0.05 K/UL — SIGNIFICANT CHANGE UP (ref 0–0.2)
BASOPHILS NFR BLD AUTO: 0.7 % — SIGNIFICANT CHANGE UP (ref 0–1)
BILIRUB SERPL-MCNC: 0.3 MG/DL — SIGNIFICANT CHANGE UP (ref 0.2–1.2)
BUN SERPL-MCNC: 12 MG/DL — SIGNIFICANT CHANGE UP (ref 10–20)
CALCIUM SERPL-MCNC: 10.6 MG/DL — HIGH (ref 8.5–10.1)
CHLORIDE SERPL-SCNC: 101 MMOL/L — SIGNIFICANT CHANGE UP (ref 98–110)
CO2 SERPL-SCNC: 30 MMOL/L — SIGNIFICANT CHANGE UP (ref 17–32)
CREAT SERPL-MCNC: 0.8 MG/DL — SIGNIFICANT CHANGE UP (ref 0.7–1.5)
EOSINOPHIL # BLD AUTO: 0.11 K/UL — SIGNIFICANT CHANGE UP (ref 0–0.7)
EOSINOPHIL NFR BLD AUTO: 1.6 % — SIGNIFICANT CHANGE UP (ref 0–8)
GLUCOSE SERPL-MCNC: 133 MG/DL — HIGH (ref 70–99)
HCT VFR BLD CALC: 39.5 % — SIGNIFICANT CHANGE UP (ref 37–47)
HGB BLD-MCNC: 12.3 G/DL — SIGNIFICANT CHANGE UP (ref 12–16)
IMM GRANULOCYTES NFR BLD AUTO: 0.3 % — SIGNIFICANT CHANGE UP (ref 0.1–0.3)
INR BLD: 0.94 RATIO — SIGNIFICANT CHANGE UP (ref 0.65–1.3)
LYMPHOCYTES # BLD AUTO: 2.41 K/UL — SIGNIFICANT CHANGE UP (ref 1.2–3.4)
LYMPHOCYTES # BLD AUTO: 34.5 % — SIGNIFICANT CHANGE UP (ref 20.5–51.1)
MCHC RBC-ENTMCNC: 25.9 PG — LOW (ref 27–31)
MCHC RBC-ENTMCNC: 31.1 G/DL — LOW (ref 32–37)
MCV RBC AUTO: 83.3 FL — SIGNIFICANT CHANGE UP (ref 81–99)
MONOCYTES # BLD AUTO: 0.56 K/UL — SIGNIFICANT CHANGE UP (ref 0.1–0.6)
MONOCYTES NFR BLD AUTO: 8 % — SIGNIFICANT CHANGE UP (ref 1.7–9.3)
NEUTROPHILS # BLD AUTO: 3.84 K/UL — SIGNIFICANT CHANGE UP (ref 1.4–6.5)
NEUTROPHILS NFR BLD AUTO: 54.9 % — SIGNIFICANT CHANGE UP (ref 42.2–75.2)
NRBC # BLD: 0 /100 WBCS — SIGNIFICANT CHANGE UP (ref 0–0)
PLATELET # BLD AUTO: 258 K/UL — SIGNIFICANT CHANGE UP (ref 130–400)
POTASSIUM SERPL-MCNC: 3.6 MMOL/L — SIGNIFICANT CHANGE UP (ref 3.5–5)
POTASSIUM SERPL-SCNC: 3.6 MMOL/L — SIGNIFICANT CHANGE UP (ref 3.5–5)
PROT SERPL-MCNC: 7.7 G/DL — SIGNIFICANT CHANGE UP (ref 6–8)
PROTHROM AB SERPL-ACNC: 10.8 SEC — SIGNIFICANT CHANGE UP (ref 9.95–12.87)
RBC # BLD: 4.74 M/UL — SIGNIFICANT CHANGE UP (ref 4.2–5.4)
RBC # FLD: 15.1 % — HIGH (ref 11.5–14.5)
SODIUM SERPL-SCNC: 142 MMOL/L — SIGNIFICANT CHANGE UP (ref 135–146)
WBC # BLD: 6.99 K/UL — SIGNIFICANT CHANGE UP (ref 4.8–10.8)
WBC # FLD AUTO: 6.99 K/UL — SIGNIFICANT CHANGE UP (ref 4.8–10.8)

## 2021-08-27 PROCEDURE — 93010 ELECTROCARDIOGRAM REPORT: CPT

## 2021-08-27 RX ORDER — ROSUVASTATIN CALCIUM 5 MG/1
1 TABLET ORAL
Qty: 0 | Refills: 0 | DISCHARGE

## 2021-08-27 RX ORDER — CHLORTHALIDONE 50 MG
1 TABLET ORAL
Qty: 0 | Refills: 0 | DISCHARGE

## 2021-08-27 RX ORDER — POTASSIUM CHLORIDE 20 MEQ
1 PACKET (EA) ORAL
Qty: 0 | Refills: 0 | DISCHARGE

## 2021-08-27 RX ORDER — METFORMIN HYDROCHLORIDE 850 MG/1
1 TABLET ORAL
Qty: 0 | Refills: 0 | DISCHARGE

## 2021-08-27 NOTE — H&P PST ADULT - NSICDXPASTSURGICALHX_GEN_ALL_CORE_FT
PAST SURGICAL HISTORY:  H/O breast surgery left lumpectomy--left arm precautions    History of left knee replacement 2/2020

## 2021-08-27 NOTE — H&P PST ADULT - NSICDXPASTMEDICALHX_GEN_ALL_CORE_FT
PAST MEDICAL HISTORY:  COVID-19 8/2020    DM (diabetes mellitus)     Dyslipidemia     Former smoker     Hypertension     Obesity     Osteoarthritis      PAST MEDICAL HISTORY:  Breast cancer     COVID-19 8/2020    DM (diabetes mellitus)     Dyslipidemia     Former smoker     Hypertension     Obesity     Osteoarthritis      PAST MEDICAL HISTORY:  Breast cancer     COVID-19 8/2020    DM (diabetes mellitus)     Dyslipidemia     Former smoker     Hepatitis-C hx of per HIE    Hypercholesterolemia     Hypertension     Mitral valve regurgitation     Obesity     Osteoarthritis     Pneumonia     Poor historian

## 2021-08-27 NOTE — H&P PST ADULT - HISTORY OF PRESENT ILLNESS
Pt denies cp palp uri cough dysuria  ET: 1 FOS-with SOB . PT denies any open wounds, drainage or rashes. hx of covid 8/2020 denies recent cough fever or infection. aware to self quaerantine preop. all instructions reviewed  scheduled for 9/3 IR  CASE   port placement. hx of breast cancer AFTER MAMMOGRAM    dx 5/2021    surgery left breast lumpectomy  6/2021. left arm precautions maintained.    denies pain  scale 0/10- denies pain meds    Anesthesia Alert  YES --Difficult Airway IV  NO--History of neck surgery or radiation  NO--Limited ROM of neck  NO--History of Malignant hyperthermia  NO--Personal or family history of Pseudocholinesterase deficiency.  NO--Prior Anesthesia Complication  NO--Latex Allergy  NO--Loose teeth  NO--History of Rheumatoid Arthritis  NO--GEOVANI  NO--Bleeding risk  yes--Other_____ left arm precautions   Pt denies cp palp uri cough dysuria  ET: 1 FOS-with SOB . PT denies any open wounds, drainage or rashes. hx of covid 8/2020 denies recent cough fever or infection. aware to self quaerantine preop. all instructions reviewed    scheduled for 9/3 IR  CASE   port placement. hx of breast dx 5/2021 after routine mammo  surgery left breast lumpectomy  6/2021. left arm precautions maintained.    denies pain  scale 0/10- denies pain meds    Anesthesia Alert  YES --Difficult Airway IV  NO--History of neck surgery or radiation  NO--Limited ROM of neck  NO--History of Malignant hyperthermia  NO--Personal or family history of Pseudocholinesterase deficiency.  NO--Prior Anesthesia Complication  NO--Latex Allergy  NO--Loose teeth  NO--History of Rheumatoid Arthritis  NO--GEOVANI  NO--Bleeding risk  yes--Other_____ left arm precautions   Pt denies cp palp uri cough dysuria  ET: 1 FOS-with SOB . PT denies any open wounds, drainage or rashes. hx of covid 8/2020 denies recent cough fever or infection. aware to self quaerantine preop. all instructions reviewed    scheduled for 9/3 IR  CASE   port placement. hx of breast dx 5/2021 after routine mammo  surgery left breast lumpectomy  6/2021. left arm precautions maintained.    denies pain  scale 0/10- denies pain meds    Anesthesia Alert  YES --Difficult Airway IV  NO--History of neck surgery or radiation  NO--Limited ROM of neck  NO--History of Malignant hyperthermia  NO--Personal or family history of Pseudocholinesterase deficiency.  NO--Prior Anesthesia Complication  NO--Latex Allergy  NO--Loose teeth  NO--History of Rheumatoid Arthritis  NO--GEOVANI  yes  BMI 43  NO--Bleeding risk  yes--Other_____ left arm precautions   Pt denies cp palp uri cough dysuria  ET: 1 FOS-with SOB . PT denies any open wounds, drainage or rashes. hx of covid 8/2020 denies recent cough fever or infection. aware to self quaerantine preop. all instructions reviewed    scheduled for 9/3 IR  CASE   port placement. hx of breast dx 5/2021 after routine mammo  surgery left breast lumpectomy  6/2021. left arm precautions maintained.    As per patient, this is their complete medical and surgical history, including medications both prescribed or over the counter.  Patient verbalized understanding of instructions and was given the opportunity to ask questions and have them answered.  poor historian regarding hx    denies pain  scale 0/10- denies pain meds    Anesthesia Alert  YES --Difficult Airway IV  NO--History of neck surgery or radiation  NO--Limited ROM of neck  NO--History of Malignant hyperthermia  NO--Personal or family history of Pseudocholinesterase deficiency.  NO--Prior Anesthesia Complication  NO--Latex Allergy  NO--Loose teeth  NO--History of Rheumatoid Arthritis  NO--GEOVANI  yes  BMI 43  NO--Bleeding risk  yes--Other_____ left arm precautions

## 2021-08-31 ENCOUNTER — OUTPATIENT (OUTPATIENT)
Dept: OUTPATIENT SERVICES | Facility: HOSPITAL | Age: 71
LOS: 1 days | Discharge: HOME | End: 2021-08-31

## 2021-08-31 ENCOUNTER — LABORATORY RESULT (OUTPATIENT)
Age: 71
End: 2021-08-31

## 2021-08-31 DIAGNOSIS — Z11.59 ENCOUNTER FOR SCREENING FOR OTHER VIRAL DISEASES: ICD-10-CM

## 2021-08-31 DIAGNOSIS — Z98.890 OTHER SPECIFIED POSTPROCEDURAL STATES: Chronic | ICD-10-CM

## 2021-08-31 DIAGNOSIS — Z96.652 PRESENCE OF LEFT ARTIFICIAL KNEE JOINT: Chronic | ICD-10-CM

## 2021-09-01 PROBLEM — Z78.9 OTHER SPECIFIED HEALTH STATUS: Chronic | Status: ACTIVE | Noted: 2021-08-27

## 2021-09-01 PROBLEM — C50.919 MALIGNANT NEOPLASM OF UNSPECIFIED SITE OF UNSPECIFIED FEMALE BREAST: Chronic | Status: ACTIVE | Noted: 2021-08-27

## 2021-09-01 PROBLEM — U07.1 COVID-19: Chronic | Status: ACTIVE | Noted: 2021-08-27

## 2021-09-01 PROBLEM — E66.9 OBESITY, UNSPECIFIED: Chronic | Status: ACTIVE | Noted: 2021-08-27

## 2021-09-01 PROBLEM — J18.9 PNEUMONIA, UNSPECIFIED ORGANISM: Chronic | Status: ACTIVE | Noted: 2021-08-27

## 2021-09-01 PROBLEM — B19.20 UNSPECIFIED VIRAL HEPATITIS C WITHOUT HEPATIC COMA: Chronic | Status: ACTIVE | Noted: 2021-08-27

## 2021-09-01 PROBLEM — I34.0 NONRHEUMATIC MITRAL (VALVE) INSUFFICIENCY: Chronic | Status: ACTIVE | Noted: 2021-08-27

## 2021-09-01 PROBLEM — E78.00 PURE HYPERCHOLESTEROLEMIA, UNSPECIFIED: Chronic | Status: ACTIVE | Noted: 2021-08-27

## 2021-09-01 PROBLEM — Z87.891 PERSONAL HISTORY OF NICOTINE DEPENDENCE: Chronic | Status: ACTIVE | Noted: 2021-08-27

## 2021-09-03 ENCOUNTER — RESULT REVIEW (OUTPATIENT)
Age: 71
End: 2021-09-03

## 2021-09-03 ENCOUNTER — OUTPATIENT (OUTPATIENT)
Dept: OUTPATIENT SERVICES | Facility: HOSPITAL | Age: 71
LOS: 1 days | Discharge: HOME | End: 2021-09-03
Payer: MEDICARE

## 2021-09-03 DIAGNOSIS — Z96.652 PRESENCE OF LEFT ARTIFICIAL KNEE JOINT: Chronic | ICD-10-CM

## 2021-09-03 DIAGNOSIS — Z98.890 OTHER SPECIFIED POSTPROCEDURAL STATES: Chronic | ICD-10-CM

## 2021-09-03 LAB — GLUCOSE BLDC GLUCOMTR-MCNC: 132 MG/DL — HIGH (ref 70–99)

## 2021-09-03 PROCEDURE — 36561 INSERT TUNNELED CV CATH: CPT

## 2021-09-03 PROCEDURE — 76937 US GUIDE VASCULAR ACCESS: CPT | Mod: 26

## 2021-09-03 PROCEDURE — 77001 FLUOROGUIDE FOR VEIN DEVICE: CPT | Mod: 26

## 2021-09-03 PROCEDURE — 99152 MOD SED SAME PHYS/QHP 5/>YRS: CPT

## 2021-09-03 RX ORDER — CEFAZOLIN SODIUM 1 G
2000 VIAL (EA) INJECTION ONCE
Refills: 0 | Status: DISCONTINUED | OUTPATIENT
Start: 2021-09-03 | End: 2021-09-17

## 2021-09-03 NOTE — PROGRESS NOTE ADULT - SUBJECTIVE AND OBJECTIVE BOX
Interventional Radiology Outpatient Documentation    PREOPERATIVE DAY OF PROCEDURE EVALUATION:     I have personally seen and examined this patient. I agree with the history and physical which I have reviewed.    Plan is for right sided-venous access port placement with moderate sedation.  The patient has a history of left breast cancer.  (Signed electronically) 09-03-21 @ 11:19     Procedure/ risks/ benefits/ goals/ alternatives were explained. All questions answered. Informed content obtained from patient. Consent placed in chart.

## 2021-09-03 NOTE — PROCEDURE NOTE - PROCEDURE FINDINGS AND DETAILS
Successful placement of right chest wall port placement via RIJ access. tip in cavoatrial junction. Pt tolerated the procedure well. No immediate complications. Successful placement of right chest wall port placement via RIJ access. Tip in the RA. Pt tolerated the procedure well. No immediate complications.

## 2021-09-07 ENCOUNTER — APPOINTMENT (OUTPATIENT)
Dept: HEMATOLOGY ONCOLOGY | Facility: CLINIC | Age: 71
End: 2021-09-07

## 2021-09-09 ENCOUNTER — APPOINTMENT (OUTPATIENT)
Dept: HEMATOLOGY ONCOLOGY | Facility: CLINIC | Age: 71
End: 2021-09-09
Payer: MEDICARE

## 2021-09-09 ENCOUNTER — APPOINTMENT (OUTPATIENT)
Dept: INTERVENTIONAL RADIOLOGY/VASCULAR | Facility: CLINIC | Age: 71
End: 2021-09-09
Payer: MEDICARE

## 2021-09-09 ENCOUNTER — APPOINTMENT (OUTPATIENT)
Dept: INFUSION THERAPY | Facility: CLINIC | Age: 71
End: 2021-09-09
Payer: MEDICARE

## 2021-09-09 ENCOUNTER — NON-APPOINTMENT (OUTPATIENT)
Age: 71
End: 2021-09-09

## 2021-09-09 ENCOUNTER — LABORATORY RESULT (OUTPATIENT)
Age: 71
End: 2021-09-09

## 2021-09-09 VITALS
TEMPERATURE: 97.8 F | BODY MASS INDEX: 45.16 KG/M2 | HEART RATE: 85 BPM | SYSTOLIC BLOOD PRESSURE: 154 MMHG | DIASTOLIC BLOOD PRESSURE: 85 MMHG | HEIGHT: 59 IN | WEIGHT: 224 LBS

## 2021-09-09 DIAGNOSIS — Z00.00 ENCOUNTER FOR GENERAL ADULT MEDICAL EXAMINATION W/OUT ABNORMAL FINDINGS: ICD-10-CM

## 2021-09-09 PROCEDURE — 99024 POSTOP FOLLOW-UP VISIT: CPT

## 2021-09-09 PROCEDURE — 99215 OFFICE O/P EST HI 40 MIN: CPT

## 2021-09-09 RX ORDER — DIPHENHYDRAMINE HCL 50 MG
50 CAPSULE ORAL ONCE
Refills: 0 | Status: COMPLETED | OUTPATIENT
Start: 2021-09-09 | End: 2021-09-09

## 2021-09-09 RX ORDER — METFORMIN HYDROCHLORIDE 500 MG/1
500 TABLET, COATED ORAL DAILY
Refills: 0 | Status: ACTIVE | COMMUNITY

## 2021-09-09 RX ORDER — ASPIRIN 81 MG
81 TABLET, DELAYED RELEASE (ENTERIC COATED) ORAL DAILY
Refills: 0 | Status: ACTIVE | COMMUNITY

## 2021-09-09 RX ORDER — AMLODIPINE BESYLATE 5 MG/1
TABLET ORAL
Refills: 0 | Status: DISCONTINUED | COMMUNITY
End: 2021-09-09

## 2021-09-09 RX ORDER — PEGFILGRASTIM-CBQV 6 MG/.6ML
6 INJECTION, SOLUTION SUBCUTANEOUS ONCE
Refills: 0 | Status: COMPLETED | OUTPATIENT
Start: 2021-09-09 | End: 2021-09-09

## 2021-09-09 RX ORDER — AMLODIPINE BESYLATE 5 MG/1
5 TABLET ORAL DAILY
Refills: 0 | Status: ACTIVE | COMMUNITY

## 2021-09-09 RX ORDER — DEXAMETHASONE 0.5 MG/5ML
12 ELIXIR ORAL ONCE
Refills: 0 | Status: COMPLETED | OUTPATIENT
Start: 2021-09-09 | End: 2021-09-09

## 2021-09-09 RX ORDER — DOCETAXEL 20 MG/ML
145 INJECTION, SOLUTION, CONCENTRATE INTRAVENOUS ONCE
Refills: 0 | Status: COMPLETED | OUTPATIENT
Start: 2021-09-09 | End: 2021-09-09

## 2021-09-09 RX ORDER — CYCLOPHOSPHAMIDE 100 MG
1160 VIAL (EA) INTRAVENOUS ONCE
Refills: 0 | Status: COMPLETED | OUTPATIENT
Start: 2021-09-09 | End: 2021-09-09

## 2021-09-09 RX ORDER — FAMOTIDINE 10 MG/ML
20 INJECTION INTRAVENOUS ONCE
Refills: 0 | Status: COMPLETED | OUTPATIENT
Start: 2021-09-09 | End: 2021-09-09

## 2021-09-09 RX ORDER — ROSUVASTATIN CALCIUM 20 MG/1
20 TABLET, FILM COATED ORAL DAILY
Refills: 0 | Status: ACTIVE | COMMUNITY

## 2021-09-09 RX ADMIN — PEGFILGRASTIM-CBQV 6 MILLIGRAM(S): 6 INJECTION, SOLUTION SUBCUTANEOUS at 12:37

## 2021-09-09 RX ADMIN — Medication 12 MILLIGRAM(S): at 12:30

## 2021-09-09 RX ADMIN — Medication 122 MILLIGRAM(S): at 12:10

## 2021-09-09 RX ADMIN — FAMOTIDINE 104 MILLIGRAM(S): 10 INJECTION INTRAVENOUS at 12:30

## 2021-09-09 RX ADMIN — Medication 50 MILLIGRAM(S): at 13:10

## 2021-09-09 RX ADMIN — Medication 1160 MILLIGRAM(S): at 15:15

## 2021-09-09 RX ADMIN — FAMOTIDINE 20 MILLIGRAM(S): 10 INJECTION INTRAVENOUS at 12:50

## 2021-09-09 RX ADMIN — Medication 308 MILLIGRAM(S): at 14:15

## 2021-09-09 RX ADMIN — DOCETAXEL 145 MILLIGRAM(S): 20 INJECTION, SOLUTION, CONCENTRATE INTRAVENOUS at 14:15

## 2021-09-09 RX ADMIN — DOCETAXEL 257.25 MILLIGRAM(S): 20 INJECTION, SOLUTION, CONCENTRATE INTRAVENOUS at 13:15

## 2021-09-09 RX ADMIN — Medication 102 MILLIGRAM(S): at 12:50

## 2021-09-09 NOTE — ASSESSMENT
[FreeTextEntry1] : 69 yo female has stage IA ([id number]) IDC of left breast, left breast lumpectomy and SNLB s/p right port a cath placement on 9/3 presents today for one week follow up. Patient is scheduled for first chemotherapy treatment after appointment today.\par \par Patient appears well, offers no complaints or concerns at this time. Patient denies fevers/chills, or N/V/D. \par \par On examination, port a cath site looks clean, dry and intact. Telfa dressing removed from both access site and port a cath site. No erythema or swelling present. No tenderness or drainage noted on manipulation. Port access site also C/D/I. No erythema, swelling or tenderness noted. No neck pain, no limited ROM. Dermabond and steri strips on site, re-educated for patient to leave Dermabond and steri strips on and gently wash around area until it falls off on its own. \par \par Educated patient on signs and symptoms of infection, told to contact us for any further questions or concerns. \par \par Port a cath ok for continued use.\par \par No further IR follow up or management needed.\par

## 2021-09-09 NOTE — PHYSICAL EXAM
[Alert] : alert [Well Nourished] : well nourished [No Acute Distress] : no acute distress [Well Developed] : well developed [Normal Sclera/Conjunctiva] : normal sclera/conjunctiva [PERRL] : pupils equal, round and reactive to light [No Neck Mass] : no neck mass was observed [Supple] : the neck was supple [No Respiratory Distress] : no respiratory distress [Normal Rate and Effort] : normal respiratory rhythm and effort [No Accessory Muscle Use] : no accessory muscle use [Normal PMI] : the apical impulse was normal [Normal Rate] : heart rate was normal  [Not Distended] : not distended [Not Tender] : non-tender [Restricted in physically strenuous activity but ambulatory and able to carry out work of a light or sedentary nature] : Restricted in physically strenuous activity but ambulatory and able to carry out work of a light or sedentary nature, e.g., light house work, office work

## 2021-09-09 NOTE — HISTORY OF PRESENT ILLNESS
[FreeTextEntry1] : 71 yo female has stage IA ([id number]) IDC of left breast, left breast lumpectomy and SNLB s/p right port a cath placement on 9/3 presents today for one week follow up.  [0] : ~His/Her~ pain was 0 out of 10

## 2021-09-09 NOTE — REVIEW OF SYSTEMS
[Fever] : no fever [Chills] : no chills [Feeling Poorly] : not feeling poorly [Feeling Tired] : not feeling tired [Eye Pain] : no eye pain [Red Eyes] : eyes not red [Heart Rate Is Slow] : the heart rate was not slow [Heart Rate Is Fast] : the heart rate was not fast [Chest Pain] : no chest pain [Palpitations] : no palpitations [Shortness Of Breath] : no shortness of breath [Wheezing] : no wheezing [Cough] : no cough [SOB on Exertion] : no shortness of breath during exertion [Abdominal Pain] : no abdominal pain [Vomiting] : no vomiting [Constipation] : no constipation [Diarrhea] : no diarrhea [Skin Lesions] : no skin lesions [Skin Wound] : no skin wound [Confused] : no confusion [Dizziness] : no dizziness

## 2021-09-10 ENCOUNTER — APPOINTMENT (OUTPATIENT)
Dept: INTERVENTIONAL RADIOLOGY/VASCULAR | Facility: CLINIC | Age: 71
End: 2021-09-10

## 2021-09-13 DIAGNOSIS — C50.912 MALIGNANT NEOPLASM OF UNSPECIFIED SITE OF LEFT FEMALE BREAST: ICD-10-CM

## 2021-09-13 DIAGNOSIS — Z45.2 ENCOUNTER FOR ADJUSTMENT AND MANAGEMENT OF VASCULAR ACCESS DEVICE: ICD-10-CM

## 2021-09-13 NOTE — ASSESSMENT
[FreeTextEntry1] : 71 yo female has stage IA ([id number]) IDC of left breast, G3, ER/[place]/Her-2 negative, s/p left breast lumpectomy and SLNB with negative margins.\par \par Germline genetic panel study (INVITAE) is negative for mutations.\par \par Recommendations:\par -- Eulalia has early stage breast cancer with 1.1 cm tumor, G3 and high proliferation index. On biopsy specimen, the tumor was ER weakly positive, VA negative and Her-2 negative. However, upon re-testing on surgical specimen, her tumor is triple negative. The discordant ER status is likely due to sampling and heterogenetic expression. In light of poorly differentiated tumor with 90% Ki 67 index, the biologic feature of her tumor is more like triple negative breast cancer. She is indicated for adjuvant chemotherapy. During her previous visit, we reviewed choice of chemotherapy regimens including dose dense Adriamycin and Cytoxan given every 2 weeks for 4 cycles followed by Taxol weekly x 12 (AC-T), Taxotere/Cytoxan every 3 weeks for 4 times (TC), and CMF every 3 weeks for 8 cycles. The side effects of chemotherapy were reviewed. Adriamycin may cause cardiomyopathy, decreased heart function and small risk for developing leukemia. Chemotherapy can cause bone marrow suppression, cytopenia, increased risk of infection, anemia, fatigue, alopecia, N/V/D, rash, infusional reaction and peripheral neuropathy. Dose-dense AC-T is more aggressive regimen and associated with high response rate but more side effects. She decided to take TC x 4.\par -- Port was placed by IR. The site is clean and dry. \par -- She will have the 1st cycle chemo today. CBC reviewed and shows adequate blood counts.\par -- Will give Neulasta on body injection after chemo to prevent neutropenia. \par -- Zofran and Compazine as needed for n/v.\par -- Imodium as needed for diarrhea. \par -- Given her tumor was stained ER weakly positive, she will be given adjuvant endocrine therapy with Anastrozole following chemotherapy.\par -- She will need adjuvant breast radiotherapy. We will refer her to see a radiation oncologist for consultation. \par \par Case was seen and discussed with Dr. Aj  who agreed with the assessment and plan.\par \par

## 2021-09-13 NOTE — PHYSICAL EXAM
[Fully active, able to carry on all pre-disease performance without restriction] : Status 0 - Fully active, able to carry on all pre-disease performance without restriction [Normal] : affect appropriate [de-identified] : Status post left breast lumpectomy and SLNB. Surgical incisions are healing well. There is no palpable abnormality.  [de-identified] : no bleeding or bruising noted

## 2021-09-13 NOTE — CONSULT LETTER
[Dear  ___] : Dear  [unfilled], [Please see my note below.] : Please see my note below. [Sincerely,] : Sincerely, [DrChano  ___] : Dr. REMY [Courtesy Letter:] : I had the pleasure of seeing your patient, [unfilled], in my office today. [FreeTextEntry3] : Elvis Aj MD

## 2021-09-13 NOTE — HISTORY OF PRESENT ILLNESS
[de-identified] : 71 yo female was referred by Dr. Bell for consultation of breast cancer. Eulalia had b/l screening mammo on 21 which showed asymmetry in the lateral left breast. On 21, left breast dx mammo and US showed hypoechoic mass, measures 8 x 7 x 13 mm  @3N10. 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 \par \par On 21, she had b/l breast MRI which showed Abnormal enhancement in the region of the biopsy corresponding to the site of index cancer. No other areas of abnormal enhancement in either breast\par \par On 21, she underwent left 3 N10 mass, radio frequency seed localized lumpectomy. 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 No lymphovascular invasion nor intraductal component is present. The surgical margins are negative. 6 sentinel lymph nodes are negative for malignancy. AJCC 8th Edition Pathologic Stage: pT1c, p(sn)N0, pMx. Biomarker study were done on surgical specimen. ER, WA and Her-2 (FISH ratio 1.3) are all negative. Ki 67 is 90%. \par \par Eulalia recovered well from surgery. She is here today to discuss adjuvant systemic therapy. \par \par She has a family history of breast cancer in her mother mother diagnosed with breast cancer at 40's. Her brother had esophageal cancer passed away at 45. She had germ line genetic test. The result is pending. \par \par She is  and was menopause at 49. \par left knee replacement. chronic pain joints. [de-identified] : \par 9/9/21\gaston Esteban is here today for follow up visit and to start chemotherapy. She has stage IA ([id number]) IDC of left breast, G3, ER/[place]/Her-2 negative, s/p left breast lumpectomy and SLNB on 6/29/21 with negative margins. During her previous visit, we discussed adjuvant chemotherapy options. I also talked to her daughter who decided to let her mother take TC regimen x 4 cycles.  She had an unofficial second opinion from her daughter's contact at a [facility]. She had her port placed on 9/3/21 without any complications.  Today, she offers no new breast-related complaints.

## 2021-09-15 ENCOUNTER — OUTPATIENT (OUTPATIENT)
Dept: OUTPATIENT SERVICES | Facility: HOSPITAL | Age: 71
LOS: 1 days | Discharge: HOME | End: 2021-09-15

## 2021-09-15 DIAGNOSIS — Z96.652 PRESENCE OF LEFT ARTIFICIAL KNEE JOINT: Chronic | ICD-10-CM

## 2021-09-15 DIAGNOSIS — C50.512 MALIGNANT NEOPLASM OF LOWER-OUTER QUADRANT OF LEFT FEMALE BREAST: ICD-10-CM

## 2021-09-15 DIAGNOSIS — Z98.890 OTHER SPECIFIED POSTPROCEDURAL STATES: Chronic | ICD-10-CM

## 2021-09-30 ENCOUNTER — APPOINTMENT (OUTPATIENT)
Dept: HEMATOLOGY ONCOLOGY | Facility: CLINIC | Age: 71
End: 2021-09-30
Payer: MEDICARE

## 2021-09-30 ENCOUNTER — APPOINTMENT (OUTPATIENT)
Dept: INFUSION THERAPY | Facility: CLINIC | Age: 71
End: 2021-09-30
Payer: MEDICARE

## 2021-09-30 ENCOUNTER — LABORATORY RESULT (OUTPATIENT)
Age: 71
End: 2021-09-30

## 2021-09-30 VITALS
HEART RATE: 85 BPM | WEIGHT: 224 LBS | DIASTOLIC BLOOD PRESSURE: 81 MMHG | BODY MASS INDEX: 45.16 KG/M2 | HEIGHT: 59 IN | TEMPERATURE: 97.7 F | SYSTOLIC BLOOD PRESSURE: 149 MMHG

## 2021-09-30 DIAGNOSIS — Z01.818 ENCOUNTER FOR OTHER PREPROCEDURAL EXAMINATION: ICD-10-CM

## 2021-09-30 PROCEDURE — 99215 OFFICE O/P EST HI 40 MIN: CPT

## 2021-09-30 RX ORDER — DIPHENHYDRAMINE HCL 50 MG
50 CAPSULE ORAL ONCE
Refills: 0 | Status: COMPLETED | OUTPATIENT
Start: 2021-09-30 | End: 2021-09-30

## 2021-09-30 RX ORDER — PEGFILGRASTIM-CBQV 6 MG/.6ML
6 INJECTION, SOLUTION SUBCUTANEOUS ONCE
Refills: 0 | Status: COMPLETED | OUTPATIENT
Start: 2021-09-30 | End: 2021-09-30

## 2021-09-30 RX ORDER — DEXAMETHASONE 0.5 MG/5ML
12 ELIXIR ORAL ONCE
Refills: 0 | Status: COMPLETED | OUTPATIENT
Start: 2021-09-30 | End: 2021-09-30

## 2021-09-30 RX ORDER — DOCETAXEL 20 MG/ML
145 INJECTION, SOLUTION, CONCENTRATE INTRAVENOUS ONCE
Refills: 0 | Status: COMPLETED | OUTPATIENT
Start: 2021-09-30 | End: 2021-09-30

## 2021-09-30 RX ORDER — POTASSIUM CHLORIDE 1500 MG/1
20 TABLET, EXTENDED RELEASE ORAL DAILY
Refills: 0 | Status: DISCONTINUED | COMMUNITY
Start: 2021-09-09 | End: 2021-09-30

## 2021-09-30 RX ORDER — FAMOTIDINE 10 MG/ML
20 INJECTION INTRAVENOUS ONCE
Refills: 0 | Status: COMPLETED | OUTPATIENT
Start: 2021-09-30 | End: 2021-09-30

## 2021-09-30 RX ORDER — CYCLOPHOSPHAMIDE 100 MG
1160 VIAL (EA) INTRAVENOUS ONCE
Refills: 0 | Status: COMPLETED | OUTPATIENT
Start: 2021-09-30 | End: 2021-09-30

## 2021-09-30 RX ADMIN — Medication 308 MILLIGRAM(S): at 13:06

## 2021-09-30 RX ADMIN — Medication 102 MILLIGRAM(S): at 12:50

## 2021-09-30 RX ADMIN — DOCETAXEL 257.25 MILLIGRAM(S): 20 INJECTION, SOLUTION, CONCENTRATE INTRAVENOUS at 13:06

## 2021-09-30 RX ADMIN — FAMOTIDINE 104 MILLIGRAM(S): 10 INJECTION INTRAVENOUS at 12:30

## 2021-09-30 RX ADMIN — Medication 122 MILLIGRAM(S): at 12:10

## 2021-09-30 RX ADMIN — PEGFILGRASTIM-CBQV 6 MILLIGRAM(S): 6 INJECTION, SOLUTION SUBCUTANEOUS at 12:06

## 2021-10-03 PROBLEM — Z01.818 EXAMINATION PRIOR TO CHEMOTHERAPY: Status: ACTIVE | Noted: 2021-08-11

## 2021-10-03 NOTE — ASSESSMENT
[FreeTextEntry1] : 71 yo female has stage IA ([id number]) IDC of left breast, G3, ER/CT/Her-2 negative, s/p left breast lumpectomy and SLNB with negative margins.\par \par Germline genetic panel study (INVITAE) is negative for mutations.\par \par Recommendations:\par -- Will proceed with 2nd cycle chemo with TC.\par -- CBC results reviewed. Blood counts are adequate for chemo. PLTs are elevated, reactive, willmonitor. . \par -- Will give Neulasta on body injection after chemo to prevent neutropenia. \par -- Zofran and Compazine as needed for n/v.\par -- Imodium as needed for diarrhea. \par -- Given her tumor was stained ER weakly positive, she will be given adjuvant endocrine therapy with Anastrozole following chemotherapy.\par -- She will need adjuvant breast radiotherapy. We will refer her to see a radiation oncologist for consultation. \par -- RTO for followup in 3 weeks. She will call if there is any new concern. \par \par Case was seen and discussed with Dr. Aj  who agreed with the assessment and plan.\par \par

## 2021-10-03 NOTE — PHYSICAL EXAM
[Fully active, able to carry on all pre-disease performance without restriction] : Status 0 - Fully active, able to carry on all pre-disease performance without restriction [Normal] : affect appropriate [de-identified] : Status post left breast lumpectomy and SLNB. Surgical incisions are healing well. There is no palpable abnormality.  [de-identified] : no bleeding or bruising noted

## 2021-10-03 NOTE — HISTORY OF PRESENT ILLNESS
[de-identified] : 71 yo female was referred by Dr. Bell for consultation of breast cancer. Eulalia had b/l screening mammo on 21 which showed asymmetry in the lateral left breast. On 21, left breast dx mammo and US showed hypoechoic mass, measures 8 x 7 x 13 mm  @3N10. \par \par On 21, US guided biopsy of left breast mass showed Invasive poorly differentiated ductal carcinoma with coagulative tumoral necrosis and an associated diffuse chronic\par lymphoplasmacytic cell inflammatory infiltrate, ER pos 21-30%, weak intensity, AR neg, her-2 neg, Ki 67 85%. \par \par On 21, she had b/l breast MRI which showed Abnormal enhancement in the region of the biopsy corresponding to the site of index cancer. No other areas of abnormal enhancement in either breast\par \par On 21, she underwent left 3 N10 mass, radio frequency seed localized lumpectomy. 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 No lymphovascular invasion nor intraductal component is present. The surgical margins are negative. 6 sentinel lymph nodes are negative for malignancy. AJCC 8th Edition Pathologic Stage: pT1c, p(sn)N0, pMx. Biomarker study were done on surgical specimen. ER, AR and Her-2 (FISH ratio 1.3) are all negative. Ki 67 is 90%. \par \par Eulalia recovered well from surgery. She is here today to discuss adjuvant systemic therapy. \par \par She has a family history of breast cancer in her mother mother diagnosed with breast cancer at 40's. Her brother had esophageal cancer passed away at 45. She had germ line genetic test. The result is pending. \par \par She is  and was menopause at 49. \par left knee replacement. chronic pain joints. [de-identified] : \par 9/9/21\gaston Esteban is here today for follow up visit and to start chemotherapy. She has stage IA ([id number]) IDC of left breast, G3, ER/[place]/Her-2 negative, s/p left breast lumpectomy and SLNB on 6/29/21 with negative margins. During her previous visit, we discussed adjuvant chemotherapy options. I also talked to her daughter who decided to let her mother take TC regimen x 4 cycles.  She had an unofficial second opinion from her daughter's contact at a [facility]. She had her port placed on 9/3/21 without any complications.  Today, she offers no new breast-related complaints.\par \par 9/30/21andria Esteban is here today for follow up visit and chemotherapy. She has stage IA ([id number]) IDC of left breast, G3, ER/[place]/Her-2 negative, s/p left breast lumpectomy and SLNB on 6/29/21 with negative margins. She started adjuvant chemo with TC and had the 1st cycle three weeks. She had nausea for several days following chemo.  She used the compazine and zofran which caused some headaches. She did not have stomatitis, diarrhea or fever.

## 2021-10-03 NOTE — CONSULT LETTER
[Dear  ___] : Dear  [unfilled], [Courtesy Letter:] : I had the pleasure of seeing your patient, [unfilled], in my office today. [Please see my note below.] : Please see my note below. [Sincerely,] : Sincerely, [DrChano  ___] : Dr. REMY [FreeTextEntry3] : Elvis Aj MD

## 2021-10-04 ENCOUNTER — NON-APPOINTMENT (OUTPATIENT)
Age: 71
End: 2021-10-04

## 2021-10-20 ENCOUNTER — APPOINTMENT (OUTPATIENT)
Dept: HEMATOLOGY ONCOLOGY | Facility: CLINIC | Age: 71
End: 2021-10-20
Payer: MEDICARE

## 2021-10-20 ENCOUNTER — APPOINTMENT (OUTPATIENT)
Dept: INFUSION THERAPY | Facility: CLINIC | Age: 71
End: 2021-10-20
Payer: MEDICARE

## 2021-10-20 ENCOUNTER — LABORATORY RESULT (OUTPATIENT)
Age: 71
End: 2021-10-20

## 2021-10-20 VITALS
SYSTOLIC BLOOD PRESSURE: 142 MMHG | TEMPERATURE: 98.3 F | HEIGHT: 59 IN | WEIGHT: 224 LBS | HEART RATE: 100 BPM | BODY MASS INDEX: 45.16 KG/M2 | DIASTOLIC BLOOD PRESSURE: 82 MMHG

## 2021-10-20 LAB
ALBUMIN SERPL ELPH-MCNC: 4.3 G/DL
ALBUMIN SERPL ELPH-MCNC: 4.5 G/DL
ALP BLD-CCNC: 67 U/L
ALP BLD-CCNC: 78 U/L
ALT SERPL-CCNC: 13 U/L
ALT SERPL-CCNC: 14 U/L
ANION GAP SERPL CALC-SCNC: 13 MMOL/L
ANION GAP SERPL CALC-SCNC: 15 MMOL/L
AST SERPL-CCNC: 16 U/L
AST SERPL-CCNC: 19 U/L
BILIRUB DIRECT SERPL-MCNC: <0.2 MG/DL
BILIRUB DIRECT SERPL-MCNC: <0.2 MG/DL
BILIRUB INDIRECT SERPL-MCNC: >0 MG/DL
BILIRUB INDIRECT SERPL-MCNC: >0.1 MG/DL
BILIRUB SERPL-MCNC: 0.2 MG/DL
BILIRUB SERPL-MCNC: 0.3 MG/DL
BUN SERPL-MCNC: 12 MG/DL
BUN SERPL-MCNC: 14 MG/DL
CALCIUM SERPL-MCNC: 10 MG/DL
CALCIUM SERPL-MCNC: 10 MG/DL
CHLORIDE SERPL-SCNC: 95 MMOL/L
CHLORIDE SERPL-SCNC: 97 MMOL/L
CO2 SERPL-SCNC: 26 MMOL/L
CO2 SERPL-SCNC: 29 MMOL/L
CREAT SERPL-MCNC: 0.7 MG/DL
CREAT SERPL-MCNC: 0.8 MG/DL
GLUCOSE SERPL-MCNC: 108 MG/DL
GLUCOSE SERPL-MCNC: 115 MG/DL
HCT VFR BLD CALC: 35.5 %
HCT VFR BLD CALC: 37.9 %
HCT VFR BLD CALC: 38.4 %
HGB BLD-MCNC: 11.5 G/DL
HGB BLD-MCNC: 11.8 G/DL
HGB BLD-MCNC: 12.3 G/DL
MCHC RBC-ENTMCNC: 26.2 PG
MCHC RBC-ENTMCNC: 26.2 PG
MCHC RBC-ENTMCNC: 26.6 PG
MCHC RBC-ENTMCNC: 31.1 G/DL
MCHC RBC-ENTMCNC: 32 G/DL
MCHC RBC-ENTMCNC: 32.4 G/DL
MCV RBC AUTO: 81.9 FL
MCV RBC AUTO: 82 FL
MCV RBC AUTO: 84 FL
PLATELET # BLD AUTO: 130 K/UL
PLATELET # BLD AUTO: 222 K/UL
PLATELET # BLD AUTO: 489 K/UL
PMV BLD: 10.7 FL
PMV BLD: 12.8 FL
PMV BLD: 9.8 FL
POTASSIUM SERPL-SCNC: 3 MMOL/L
POTASSIUM SERPL-SCNC: 4.4 MMOL/L
PROT SERPL-MCNC: 7.6 G/DL
PROT SERPL-MCNC: 7.8 G/DL
RBC # BLD: 4.33 M/UL
RBC # BLD: 4.51 M/UL
RBC # BLD: 4.69 M/UL
RBC # FLD: 14.6 %
RBC # FLD: 15.9 %
RBC # FLD: 17 %
SODIUM SERPL-SCNC: 137 MMOL/L
SODIUM SERPL-SCNC: 138 MMOL/L
WBC # FLD AUTO: 6.56 K/UL
WBC # FLD AUTO: 8.45 K/UL
WBC # FLD AUTO: 8.74 K/UL

## 2021-10-20 PROCEDURE — 99215 OFFICE O/P EST HI 40 MIN: CPT

## 2021-10-20 RX ORDER — FAMOTIDINE 10 MG/ML
20 INJECTION INTRAVENOUS ONCE
Refills: 0 | Status: COMPLETED | OUTPATIENT
Start: 2021-10-20 | End: 2021-10-20

## 2021-10-20 RX ORDER — DOCETAXEL 20 MG/ML
145 INJECTION, SOLUTION, CONCENTRATE INTRAVENOUS ONCE
Refills: 0 | Status: COMPLETED | OUTPATIENT
Start: 2021-10-20 | End: 2021-10-20

## 2021-10-20 RX ORDER — PEGFILGRASTIM-CBQV 6 MG/.6ML
6 INJECTION, SOLUTION SUBCUTANEOUS ONCE
Refills: 0 | Status: COMPLETED | OUTPATIENT
Start: 2021-10-20 | End: 2021-10-20

## 2021-10-20 RX ORDER — DEXAMETHASONE 0.5 MG/5ML
12 ELIXIR ORAL ONCE
Refills: 0 | Status: COMPLETED | OUTPATIENT
Start: 2021-10-20 | End: 2021-10-20

## 2021-10-20 RX ORDER — DIPHENHYDRAMINE HCL 50 MG
50 CAPSULE ORAL ONCE
Refills: 0 | Status: COMPLETED | OUTPATIENT
Start: 2021-10-20 | End: 2021-10-20

## 2021-10-20 RX ORDER — CYCLOPHOSPHAMIDE 100 MG
1160 VIAL (EA) INTRAVENOUS ONCE
Refills: 0 | Status: COMPLETED | OUTPATIENT
Start: 2021-10-20 | End: 2021-10-20

## 2021-10-20 RX ADMIN — DOCETAXEL 257.25 MILLIGRAM(S): 20 INJECTION, SOLUTION, CONCENTRATE INTRAVENOUS at 15:45

## 2021-10-20 RX ADMIN — PEGFILGRASTIM-CBQV 6 MILLIGRAM(S): 6 INJECTION, SOLUTION SUBCUTANEOUS at 15:43

## 2021-10-20 RX ADMIN — Medication 308 MILLIGRAM(S): at 15:45

## 2021-10-20 RX ADMIN — FAMOTIDINE 104 MILLIGRAM(S): 10 INJECTION INTRAVENOUS at 14:58

## 2021-10-20 RX ADMIN — Medication 102 MILLIGRAM(S): at 14:58

## 2021-10-20 RX ADMIN — Medication 122 MILLIGRAM(S): at 14:58

## 2021-10-20 NOTE — HISTORY OF PRESENT ILLNESS
[de-identified] : 71 yo female was referred by Dr. Bell for consultation of breast cancer. Eulalia had b/l screening mammo on 21 which showed asymmetry in the lateral left breast. On 21, left breast dx mammo and US showed hypoechoic mass, measures 8 x 7 x 13 mm  @3N10. 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 \par \par On 21, she had b/l breast MRI which showed Abnormal enhancement in the region of the biopsy corresponding to the site of index cancer. No other areas of abnormal enhancement in either breast\par \par On 21, she underwent left 3 N10 mass, radio frequency seed localized lumpectomy. 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 No lymphovascular invasion nor intraductal component is present. The surgical margins are negative. 6 sentinel lymph nodes are negative for malignancy. AJCC 8th Edition Pathologic Stage: pT1c, p(sn)N0, pMx. Biomarker study were done on surgical specimen. ER, MA and Her-2 (FISH ratio 1.3) are all negative. Ki 67 is 90%. \par \par Eulalia recovered well from surgery. She is here today to discuss adjuvant systemic therapy. \par \par She has a family history of breast cancer in her mother mother diagnosed with breast cancer at 40's. Her brother had esophageal cancer passed away at 45. She had germ line genetic test. The result is pending. \par \par She is  and was menopause at 49. \par left knee replacement. chronic pain joints. [de-identified] : \par 9/9/21andria Esteban is here today for follow up visit and to start chemotherapy. She has stage IA ([id number]) IDC of left breast, G3, ER/[place]/Her-2 negative, s/p left breast lumpectomy and SLNB on 6/29/21 with negative margins. During her previous visit, we discussed adjuvant chemotherapy options. I also talked to her daughter who decided to let her mother take TC regimen x 4 cycles.  She had an unofficial second opinion from her daughter's contact at a [facility]. She had her port placed on 9/3/21 without any complications.  Today, she offers no new breast-related complaints.\par \par 9/30/21andria Esteban is here today for follow up visit and chemotherapy. She has stage IA ([id number]) IDC of left breast, G3, ER/[place]/Her-2 negative, s/p left breast lumpectomy and SLNB on 6/29/21 with negative margins. She started adjuvant chemo with TC and had the 1st cycle three weeks. She had nausea for several days following chemo.  She used the compazine and zofran which caused some headaches. She did not have stomatitis, diarrhea or fever. \par \par 10/20/21andria Esteban is here today for follow up visit and chemotherapy. She has stage IA ([id number]) IDC of left breast, G3, ER/DC/Her-2 negative, s/p left breast lumpectomy and SLNB on 6/29/21 with negative margins. She started adjuvant chemo with TC and to date she received 2 cycles. She states 2 days after the treatment she developed pruritic and a pruritic rash on her head subsided with Benadryl and PO and Benadryl cream. She had nausea and fatigue for several days following chemo. She used the zofran which relieves nausea. She also reports numbness and tingling to left fingers started after 2nd treatment. She did not have stomatitis, diarrhea or fever.

## 2021-10-20 NOTE — REVIEW OF SYSTEMS
[Negative] : Allergic/Immunologic [Skin Rash] : skin rash [de-identified] : skin rash after treatment resolved. numbness and tingling to left hand.

## 2021-10-20 NOTE — ASSESSMENT
[FreeTextEntry1] : 69 yo female has stage IA ([id number]) IDC of left breast, G3, ER/[place]/Her-2 negative, s/p left breast lumpectomy and SLNB with negative margins.\par \par Germline genetic panel study (INVITAE) is negative for mutations.\par \par Recommendations:\par -- Will proceed with 3rd cycle chemo with TC.\par -- CBC results reviewed. Blood counts are adequate for chemo. PLTs are normal. \par -- Will give Neulasta on body injection after chemo to prevent neutropenia. \par -- Itching and rash after treatment is likely reaction to chemo. Will give Decadron 4 mg po daily x 2 days after chemo and Benadryl 25 mg po q12h as needed. Advised to call if there is problem.\par -- Zofran and Compazine as needed for n/v.\par -- Imodium as needed for diarrhea. \par -- Given her tumor was stained ER weakly positive, she will be given adjuvant endocrine therapy with Anastrozole following chemotherapy.\par -- She will need adjuvant breast radiotherapy. We will refer her to see a radiation oncologist for consultation. \par -- RTO for followup in 3 weeks. She will call if there is any new concern. \par \par Case was seen and discussed with Dr. Aj  who agreed with the assessment and plan.\par \par

## 2021-10-20 NOTE — PHYSICAL EXAM
[Fully active, able to carry on all pre-disease performance without restriction] : Status 0 - Fully active, able to carry on all pre-disease performance without restriction [Normal] : affect appropriate [de-identified] : Status post left breast lumpectomy and SLNB. Surgical incisions are healing well. There is no palpable abnormality.  [de-identified] : no bleeding or bruising noted, no rash

## 2021-10-22 ENCOUNTER — LABORATORY RESULT (OUTPATIENT)
Age: 71
End: 2021-10-22

## 2021-10-22 ENCOUNTER — OUTPATIENT (OUTPATIENT)
Dept: OUTPATIENT SERVICES | Facility: HOSPITAL | Age: 71
LOS: 1 days | Discharge: HOME | End: 2021-10-22

## 2021-10-22 DIAGNOSIS — Z96.652 PRESENCE OF LEFT ARTIFICIAL KNEE JOINT: Chronic | ICD-10-CM

## 2021-10-22 DIAGNOSIS — Z11.59 ENCOUNTER FOR SCREENING FOR OTHER VIRAL DISEASES: ICD-10-CM

## 2021-10-22 DIAGNOSIS — Z98.890 OTHER SPECIFIED POSTPROCEDURAL STATES: Chronic | ICD-10-CM

## 2021-10-25 ENCOUNTER — OUTPATIENT (OUTPATIENT)
Dept: OUTPATIENT SERVICES | Facility: HOSPITAL | Age: 71
LOS: 1 days | Discharge: HOME | End: 2021-10-25
Payer: MEDICARE

## 2021-10-25 ENCOUNTER — RESULT REVIEW (OUTPATIENT)
Age: 71
End: 2021-10-25

## 2021-10-25 DIAGNOSIS — Z96.652 PRESENCE OF LEFT ARTIFICIAL KNEE JOINT: Chronic | ICD-10-CM

## 2021-10-25 DIAGNOSIS — Z98.890 OTHER SPECIFIED POSTPROCEDURAL STATES: Chronic | ICD-10-CM

## 2021-10-25 PROCEDURE — 36598 INJ W/FLUOR EVAL CV DEVICE: CPT

## 2021-10-25 RX ORDER — CEPHALEXIN 500 MG
500 CAPSULE ORAL ONCE
Refills: 0 | Status: DISCONTINUED | OUTPATIENT
Start: 2021-10-25 | End: 2021-11-08

## 2021-10-25 NOTE — PROGRESS NOTE ADULT - SUBJECTIVE AND OBJECTIVE BOX
Interventional Radiology Brief- Operative Note    Procedure: right sided image guided port a cath evaluation/revision    Pre-Op Diagnosis: left breast cancer     Post-Op Diagnosis: same     Attending: Nael Vidal MD  Assistant: Marium Mays PA-C    Anesthesia (type):  [ ] General Anesthesia  [ ] Sedation  [ ] Spinal Anesthesia  [ x ] Local/Regional    Contrast: none     Estimated Blood Loss: <5cc    Condition:   [ ] Critical  [ ] Serious  [ ] Fair   [ x ] Good    Findings/Follow up Plan of Care: initial evaluation showed port a cath in upright position, flushed thoroughly with NS, multiple sutures placed to anchor port in place due to history of flipping, port heparinized and sutured closed, patient tolerated procedure well, no immediate complications    Specimens Removed: none     Implants: pre-existing port a cath     Complications: none     Condition/Disposition: d/c home, patient given script for Keflex 500mg Q12 x 7 days - first dose given post procedure in IR, plan for follow up in IR clinic next Tuesday 11/2 @ 1pm      Please call Interventional Radiology [phone] with any questions, concerns, or issues.         Interventional Radiology Brief- Operative Note    Procedure: right sided image guided port a cath evaluation/revision    Pre-Op Diagnosis: Left breast cancer     Post-Op Diagnosis: same     Attending: Nael Vidal DO  Assistant: Marium Mays PA-C    Anesthesia (type):  [ ] General Anesthesia  [ ] Sedation  [ ] Spinal Anesthesia  [ x ] Local/Regional    Contrast: none     Estimated Blood Loss: <5cc    Condition:   [ ] Critical  [ ] Serious  [ ] Fair   [ x ] Good    Findings/Follow up Plan of Care: initial evaluation showed port a cath in upright position, flushed thoroughly with NS, multiple sutures placed to anchor port in place due to recent history of difficult access from possible rotation within the pocket, port heparinized and pocket sutured closed, patient tolerated procedure well, no immediate complications    Specimens Removed: none     Implants: pre-existing port a cath     Complications: none     Condition/Disposition: d/c home, patient given script for Keflex 500mg Q12 x 7 days - first dose given post procedure in IR, plan for follow up in IR clinic next Tuesday 11/2 @ 1pm      Please call Interventional Radiology [phone] with any questions, concerns, or issues.

## 2021-10-29 ENCOUNTER — APPOINTMENT (OUTPATIENT)
Dept: INTERVENTIONAL RADIOLOGY/VASCULAR | Facility: CLINIC | Age: 71
End: 2021-10-29

## 2021-10-29 PROCEDURE — XXXXX: CPT

## 2021-11-02 ENCOUNTER — APPOINTMENT (OUTPATIENT)
Dept: INTERVENTIONAL RADIOLOGY/VASCULAR | Facility: CLINIC | Age: 71
End: 2021-11-02
Payer: MEDICARE

## 2021-11-02 PROCEDURE — 99024 POSTOP FOLLOW-UP VISIT: CPT

## 2021-11-02 NOTE — ASSESSMENT
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 \par Patient was sent home with script for Cephalexin 500mg Q12 x 7 days. \par

## 2021-11-02 NOTE — REVIEW OF SYSTEMS
[Fever] : no fever [Chills] : no chills [Feeling Poorly] : not feeling poorly [Feeling Tired] : not feeling tired [Heart Rate Is Slow] : the heart rate was not slow [Heart Rate Is Fast] : the heart rate was not fast [Chest Pain] : no chest pain [Palpitations] : no palpitations [Shortness Of Breath] : no shortness of breath [Wheezing] : no wheezing [Cough] : no cough [SOB on Exertion] : no shortness of breath during exertion [Abdominal Pain] : no abdominal pain [Vomiting] : no vomiting [Constipation] : no constipation [Diarrhea] : no diarrhea [Confused] : no confusion [Dizziness] : no dizziness

## 2021-11-02 NOTE — PHYSICAL EXAM
[Alert] : alert [No Acute Distress] : no acute distress [Well Nourished] : well nourished [Well Developed] : well developed [Healthy Appearance] : healthy appearance [Normal Sclera/Conjunctiva] : normal sclera/conjunctiva [PERRL] : pupils equal, round and reactive to light [No Respiratory Distress] : no respiratory distress [Normal Rate and Effort] : normal respiratory rhythm and effort [No Accessory Muscle Use] : no accessory muscle use [Normal PMI] : the apical impulse was normal [Normal Rate] : heart rate was normal  [Oriented x3] : oriented to person, place, and time [Normal Insight/Judgement] : insight and judgment were intact [Normal Affect] : the affect was normal [Normal Mood] : the mood was normal [Restricted in physically strenuous activity but ambulatory and able to carry out work of a light or sedentary nature] : Restricted in physically strenuous activity but ambulatory and able to carry out work of a light or sedentary nature, e.g., light house work, office work

## 2021-11-02 NOTE — HISTORY OF PRESENT ILLNESS
[FreeTextEntry1] : 71 yo female has stage IA ([id number]) IDC of left breast, left breast lumpectomy and SNLB s/p right port a cath placement on 9/3 presents today for exposed port site.\par \par Patient had initial port placed on 9/3 for chemotherapy due to poor peripheral access. Two week ago [facility] reached out concerned because they were having difficulty accessing the port site and were concerned it was flipped. Patient was then scheduled for port a cath evaluation/revision. At the time of procedure, initial evaluation showed port a cath in upright position, flushed thoroughly with NS, multiple sutures placed to anchor port in place. Port was heparinized and pocket sutured closed. Patient tolerated procedure well with no immediate complications. \par \par Patient called Friday stating she noticed bleeding from the suture site. Patient was seen in IR clinic by Dr. Dolan. Dressing was noted to have dried blood, no active bleed noted. Port site was soft, non-tender with no evidence of hematoma. Incision appeared normal and well apposed. New sterile dressing was placed and patient was sent home. \par \par Patient had initial routine follow up scheduled for today. On arrival patient expressed concern stating she removed the dressing this morning and saw the site was completely open with pus coming through. Patient admits to having a temperature on Saturday, Tmax of approx 101. Patient states she hasn't had a fever since that episode. Patient denies chills, N/V/D. On examination, wound completely dehisced with evidence of pus within the wound. No pain, tenderness or active drainage noted on manipulation. Port site flushed with 20cc NS, sterile gauze/Tegaderm placed for coverage, will remain in plan until removal tomorrow.\par \par As per patient she only has one scheduled treatment left and then radiation to follow. Called [facility] to confirm plan, spoke with Angie, stated patient can get last treatment through peripheral access, new port not needed at this time. She will notify Dr. Aj.\par

## 2021-11-03 ENCOUNTER — OUTPATIENT (OUTPATIENT)
Dept: OUTPATIENT SERVICES | Facility: HOSPITAL | Age: 71
LOS: 1 days | Discharge: HOME | End: 2021-11-03
Payer: MEDICARE

## 2021-11-03 ENCOUNTER — RESULT REVIEW (OUTPATIENT)
Age: 71
End: 2021-11-03

## 2021-11-03 DIAGNOSIS — Z98.890 OTHER SPECIFIED POSTPROCEDURAL STATES: Chronic | ICD-10-CM

## 2021-11-03 DIAGNOSIS — Z96.652 PRESENCE OF LEFT ARTIFICIAL KNEE JOINT: Chronic | ICD-10-CM

## 2021-11-03 PROCEDURE — 36590 REMOVAL TUNNELED CV CATH: CPT

## 2021-11-03 PROCEDURE — 77001 FLUOROGUIDE FOR VEIN DEVICE: CPT | Mod: 26

## 2021-11-03 NOTE — PROCEDURE NOTE - ADDITIONAL PROCEDURE DETAILS
[facility] [employer] [facility] Midline placed for chemotherapy access on 11/11 in [place] - port removed d/t infection  10cm, tip mid-axillary, ok to use

## 2021-11-03 NOTE — PROGRESS NOTE ADULT - SUBJECTIVE AND OBJECTIVE BOX
Interventional Radiology Brief- Operative Note    Procedure: right sided image guided port a cath removal     Pre-Op Diagnosis: left breast cancer     Post-Op Diagnosis: same     Performing Provider: Marium Mays PA-C    Anesthesia (type):  [ ] General Anesthesia  [ ] Sedation  [ ] Spinal Anesthesia  [ x ] Local/Regional    Contrast: none     Estimated Blood Loss: <5cc    Condition:   [ ] Critical  [ ] Serious  [ ] Fair   [ x ] Good    Findings/Follow up Plan of Care: successful removal of port a cath, flushed with 40cc NS, packed with iodoform, covered with gauze and Tegaderm - full report in PACS    Specimens Removed: none     Implants: 8.5Fr port a cath removed    Complications: none     Condition/Disposition: d/c home, patient to follow up Friday 11/8 for additional packing       Please call Interventional Radiology [phone] with any questions, concerns, or issues.

## 2021-11-04 DIAGNOSIS — Z45.2 ENCOUNTER FOR ADJUSTMENT AND MANAGEMENT OF VASCULAR ACCESS DEVICE: ICD-10-CM

## 2021-11-05 ENCOUNTER — APPOINTMENT (OUTPATIENT)
Dept: INTERVENTIONAL RADIOLOGY/VASCULAR | Facility: CLINIC | Age: 71
End: 2021-11-05
Payer: MEDICARE

## 2021-11-05 PROCEDURE — 12021 TX SUPFC WND DEHSN W/PACKING: CPT

## 2021-11-05 PROCEDURE — 99024 POSTOP FOLLOW-UP VISIT: CPT

## 2021-11-05 NOTE — ASSESSMENT
[FreeTextEntry1] : 71 yo female has stage IA ([id number]) IDC of left breast, left breast lumpectomy and SNLB s/p right port a cath removal on 11/3 presents today for first packing.\par \par Advised to keep wound site covered until next packing appointment which is scheduled for Monday 11/8. Patient to follow up with Dr. Aj for chemotherapy plan. Patient has midline that can be utilized for up to 4 weeks. If treatment surpasses 4 weeks can replace access as needed. \par \par Patient currently on Cephalexin 500mg Q12 x 7 days. Tolerating well.\par \par Patient instructed to contact us for any further questions or concerns.

## 2021-11-05 NOTE — PHYSICAL EXAM
[Alert] : alert [No Acute Distress] : no acute distress [Well Nourished] : well nourished [Well Developed] : well developed [Healthy Appearance] : healthy appearance [Normal Sclera/Conjunctiva] : normal sclera/conjunctiva [PERRL] : pupils equal, round and reactive to light [No Respiratory Distress] : no respiratory distress [Normal Rate and Effort] : normal respiratory rhythm and effort [No Accessory Muscle Use] : no accessory muscle use [Normal PMI] : the apical impulse was normal [Normal Rate] : heart rate was normal  [No Rash] : no rash [No Skin Lesions] : no skin lesions [Oriented x3] : oriented to person, place, and time [Normal Insight/Judgement] : insight and judgment were intact [Normal Affect] : the affect was normal [Normal Mood] : the mood was normal [Restricted in physically strenuous activity but ambulatory and able to carry out work of a light or sedentary nature] : Restricted in physically strenuous activity but ambulatory and able to carry out work of a light or sedentary nature, e.g., light house work, office work

## 2021-11-05 NOTE — REVIEW OF SYSTEMS
[Fever] : no fever [Chills] : no chills [Feeling Poorly] : not feeling poorly [Feeling Tired] : not feeling tired [Eye Pain] : no eye pain [Red Eyes] : eyes not red [Heart Rate Is Slow] : the heart rate was not slow [Heart Rate Is Fast] : the heart rate was not fast [Chest Pain] : no chest pain [Palpitations] : no palpitations [Shortness Of Breath] : no shortness of breath [Wheezing] : no wheezing [Cough] : no cough [SOB on Exertion] : no shortness of breath during exertion [Abdominal Pain] : no abdominal pain [Vomiting] : no vomiting [Constipation] : no constipation [Diarrhea] : no diarrhea [Confused] : no confusion [Dizziness] : no dizziness [Fainting] : no fainting

## 2021-11-05 NOTE — HISTORY OF PRESENT ILLNESS
[FreeTextEntry1] : 69 yo female has stage IA ([id number]) IDC of left breast, left breast lumpectomy and SNLB s/p right port a cath removal on 11/3 presents today for first packing.\par \par Patient appears well, offers no complaints or concerns at this time. Patient denies fevers/chills, or N/V/D. Patient states she saw her primary care physician this morning and they have diagnosed her with bronchitis. She is unsure if she will be able to proceed with her last scheduled chemotherapy. She plans to go to her scheduled appointment to speak with Dr. Aj and will see what is recommended.\par \par On examination, port wound site site healing. Pus still noted within the pocket. No erythema or swelling present. No tenderness or drainage noted on manipulation. Iodoform packing removed, pocket flushed with 30cc NS. New iodoform packing placed, gauze and Tegaderm placed over site. \par \par \par

## 2021-11-08 ENCOUNTER — APPOINTMENT (OUTPATIENT)
Dept: INTERVENTIONAL RADIOLOGY/VASCULAR | Facility: CLINIC | Age: 71
End: 2021-11-08
Payer: MEDICARE

## 2021-11-08 PROCEDURE — 99024 POSTOP FOLLOW-UP VISIT: CPT

## 2021-11-09 NOTE — HISTORY OF PRESENT ILLNESS
[FreeTextEntry1] : 71 yo female has stage IA ([id number]) IDC of left breast, left breast lumpectomy and SNLB s/p right port a cath removal on 11/3 presents today for second packing.\par \par Patient appears well, offers no complaints or concerns at this time. Patient denies fevers/chills, or N/V/D. \par \par On examination, port wound site site healing. Slight pus still noted within the pocket but improving. No erythema or swelling present. No tenderness or drainage noted on manipulation. Iodoform packing removed, pocket flushed with 30cc NS. New iodoform packing placed, gauze and Tegaderm placed over site.  [0] : ~His/Her~ pain was 0 out of 10

## 2021-11-09 NOTE — ASSESSMENT
[FreeTextEntry1] : 71 yo female has stage IA ([id number]) IDC of left breast, left breast lumpectomy and SNLB s/p right port a cath removal on 11/3 presents today for second packing.\par \par Advised to keep wound site covered until next packing appointment which is scheduled for Monday 11/15. Patient to follow up with Dr. Aj for chemotherapy plan. Patient has midline that can be utilized for up to 4 weeks. If treatment surpasses 4 weeks can replace access as needed. \par \par Patient currently on Cephalexin 500mg Q12 x 7 days. Tolerating well.\par \par Patient instructed to contact us for any further questions or concerns.

## 2021-11-11 ENCOUNTER — OUTPATIENT (OUTPATIENT)
Dept: OUTPATIENT SERVICES | Facility: HOSPITAL | Age: 71
LOS: 1 days | Discharge: HOME | End: 2021-11-11

## 2021-11-11 ENCOUNTER — LABORATORY RESULT (OUTPATIENT)
Age: 71
End: 2021-11-11

## 2021-11-11 ENCOUNTER — APPOINTMENT (OUTPATIENT)
Dept: HEMATOLOGY ONCOLOGY | Facility: CLINIC | Age: 71
End: 2021-11-11
Payer: MEDICARE

## 2021-11-11 ENCOUNTER — APPOINTMENT (OUTPATIENT)
Dept: INFUSION THERAPY | Facility: CLINIC | Age: 71
End: 2021-11-11
Payer: MEDICARE

## 2021-11-11 VITALS
BODY MASS INDEX: 43.55 KG/M2 | RESPIRATION RATE: 14 BRPM | HEIGHT: 59 IN | SYSTOLIC BLOOD PRESSURE: 180 MMHG | DIASTOLIC BLOOD PRESSURE: 81 MMHG | TEMPERATURE: 97.6 F | HEART RATE: 89 BPM | WEIGHT: 216 LBS

## 2021-11-11 DIAGNOSIS — Z51.11 ENCOUNTER FOR ANTINEOPLASTIC CHEMOTHERAPY: ICD-10-CM

## 2021-11-11 DIAGNOSIS — L27.0 GENERALIZED SKIN ERUPTION DUE TO DRUGS AND MEDICAMENTS TAKEN INTERNALLY: ICD-10-CM

## 2021-11-11 DIAGNOSIS — Z96.652 PRESENCE OF LEFT ARTIFICIAL KNEE JOINT: Chronic | ICD-10-CM

## 2021-11-11 DIAGNOSIS — Z98.890 OTHER SPECIFIED POSTPROCEDURAL STATES: Chronic | ICD-10-CM

## 2021-11-11 DIAGNOSIS — C50.512 MALIGNANT NEOPLASM OF LOWER-OUTER QUADRANT OF LEFT FEMALE BREAST: ICD-10-CM

## 2021-11-11 DIAGNOSIS — Z01.818 ENCOUNTER FOR OTHER PREPROCEDURAL EXAMINATION: ICD-10-CM

## 2021-11-11 LAB
ALBUMIN SERPL ELPH-MCNC: 4.3 G/DL
ALP BLD-CCNC: 67 U/L
ALT SERPL-CCNC: 13 U/L
ANION GAP SERPL CALC-SCNC: 16 MMOL/L
AST SERPL-CCNC: 19 U/L
BILIRUB DIRECT SERPL-MCNC: <0.2 MG/DL
BILIRUB INDIRECT SERPL-MCNC: >0.1 MG/DL
BILIRUB SERPL-MCNC: 0.3 MG/DL
BUN SERPL-MCNC: 11 MG/DL
CALCIUM SERPL-MCNC: 9.6 MG/DL
CHLORIDE SERPL-SCNC: 97 MMOL/L
CO2 SERPL-SCNC: 25 MMOL/L
CREAT SERPL-MCNC: 0.8 MG/DL
GLUCOSE SERPL-MCNC: 80 MG/DL
HCT VFR BLD CALC: 38 %
HGB BLD-MCNC: 12.2 G/DL
MCHC RBC-ENTMCNC: 26.7 PG
MCHC RBC-ENTMCNC: 32.1 G/DL
MCV RBC AUTO: 83.2 FL
PLATELET # BLD AUTO: 409 K/UL
PMV BLD: 10.7 FL
POTASSIUM SERPL-SCNC: 3.3 MMOL/L
PROT SERPL-MCNC: 7.6 G/DL
RBC # BLD: 4.57 M/UL
RBC # FLD: 18.4 %
SODIUM SERPL-SCNC: 138 MMOL/L
WBC # FLD AUTO: 10.68 K/UL

## 2021-11-11 PROCEDURE — 99215 OFFICE O/P EST HI 40 MIN: CPT

## 2021-11-11 RX ORDER — FAMOTIDINE 10 MG/ML
20 INJECTION INTRAVENOUS ONCE
Refills: 0 | Status: COMPLETED | OUTPATIENT
Start: 2021-11-11 | End: 2021-11-11

## 2021-11-11 RX ORDER — DIPHENHYDRAMINE HCL 50 MG
50 CAPSULE ORAL ONCE
Refills: 0 | Status: COMPLETED | OUTPATIENT
Start: 2021-11-11 | End: 2021-11-11

## 2021-11-11 RX ORDER — DEXAMETHASONE 0.5 MG/5ML
12 ELIXIR ORAL ONCE
Refills: 0 | Status: COMPLETED | OUTPATIENT
Start: 2021-11-11 | End: 2021-11-11

## 2021-11-11 RX ORDER — DOCETAXEL 20 MG/ML
143 INJECTION, SOLUTION, CONCENTRATE INTRAVENOUS ONCE
Refills: 0 | Status: COMPLETED | OUTPATIENT
Start: 2021-11-11 | End: 2021-11-11

## 2021-11-11 RX ORDER — CYCLOPHOSPHAMIDE 100 MG
1146 VIAL (EA) INTRAVENOUS ONCE
Refills: 0 | Status: COMPLETED | OUTPATIENT
Start: 2021-11-11 | End: 2021-11-11

## 2021-11-11 RX ORDER — PEGFILGRASTIM-CBQV 6 MG/.6ML
6 INJECTION, SOLUTION SUBCUTANEOUS ONCE
Refills: 0 | Status: COMPLETED | OUTPATIENT
Start: 2021-11-11 | End: 2021-11-11

## 2021-11-11 RX ADMIN — Medication 307.3 MILLIGRAM(S): at 17:40

## 2021-11-11 RX ADMIN — Medication 102 MILLIGRAM(S): at 17:00

## 2021-11-11 RX ADMIN — Medication 12 MILLIGRAM(S): at 16:40

## 2021-11-11 RX ADMIN — DOCETAXEL 257.15 MILLIGRAM(S): 20 INJECTION, SOLUTION, CONCENTRATE INTRAVENOUS at 16:44

## 2021-11-11 RX ADMIN — PEGFILGRASTIM-CBQV 6 MILLIGRAM(S): 6 INJECTION, SOLUTION SUBCUTANEOUS at 16:43

## 2021-11-11 RX ADMIN — FAMOTIDINE 20 MILLIGRAM(S): 10 INJECTION INTRAVENOUS at 17:00

## 2021-11-11 RX ADMIN — FAMOTIDINE 104 MILLIGRAM(S): 10 INJECTION INTRAVENOUS at 16:40

## 2021-11-11 RX ADMIN — Medication 50 MILLIGRAM(S): at 17:20

## 2021-11-11 RX ADMIN — Medication 122 MILLIGRAM(S): at 16:21

## 2021-11-12 DIAGNOSIS — C50.912 MALIGNANT NEOPLASM OF UNSPECIFIED SITE OF LEFT FEMALE BREAST: ICD-10-CM

## 2021-11-12 DIAGNOSIS — T80.212A LOCAL INFECTION DUE TO CENTRAL VENOUS CATHETER, INITIAL ENCOUNTER: ICD-10-CM

## 2021-11-12 NOTE — ASSESSMENT
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 \par -- CBC results reviewed. Blood counts are adequate for chemo.\par -- Will give Neulasta on body injection after chemo to prevent neutropenia. \par -- Itching and rash after treatment is likely reaction to chemo. Continue Decadron 4 mg po daily x 2 days after chemo and Benadryl 25 mg po q12h as needed. Advised to call if there is problem.\par -- Zofran and Compazine as needed for n/v.\par -- Imodium as needed for diarrhea. \par -- Given her tumor was stained ER weakly positive, she will be given adjuvant endocrine therapy with Anastrozole following chemotherapy.\par -- She will need adjuvant breast radiotherapy. We will refer her to see a radiation oncologist for consultation. \par -- RTO for followup in 4 weeks. She will call if there is any new concern. \par \par Case was seen and discussed with Dr. Aj  who agreed with the assessment and plan.\par \par

## 2021-11-12 NOTE — PHYSICAL EXAM
[Fully active, able to carry on all pre-disease performance without restriction] : Status 0 - Fully active, able to carry on all pre-disease performance without restriction [Normal] : affect appropriate [de-identified] : Status post left breast lumpectomy and SLNB. Surgical incisions are healing well. There is no palpable abnormality.  [de-identified] : no bleeding or bruising noted, no rash. Right chest wall dressing in place, drainage noted. Right upper arm PICC line dressing intact no drainage or erythema.

## 2021-11-12 NOTE — REVIEW OF SYSTEMS
[Skin Rash] : skin rash [Negative] : Allergic/Immunologic [de-identified] : skin rash after treatment resolved. numbness and tingling to left hand.

## 2021-11-12 NOTE — HISTORY OF PRESENT ILLNESS
[de-identified] : 69 yo female was referred by Dr. Bell for consultation of breast cancer. Eulalia had b/l screening mammo on 21 which showed asymmetry in the lateral left breast. On 21, left breast dx mammo and US showed hypoechoic mass, measures 8 x 7 x 13 mm  @3N10. 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 \par \par On 21, she had b/l breast MRI which showed Abnormal enhancement in the region of the biopsy corresponding to the site of index cancer. No other areas of abnormal enhancement in either breast\par \par On 21, she underwent left 3 N10 mass, radio frequency seed localized lumpectomy. 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 No lymphovascular invasion nor intraductal component is present. The surgical margins are negative. 6 sentinel lymph nodes are negative for malignancy. AJCC 8th Edition Pathologic Stage: pT1c, p(sn)N0, pMx. Biomarker study were done on surgical specimen. ER, [place] and Her-2 (FISH ratio 1.3) are all negative. Ki 67 is 90%. \par \par Eulalia recovered well from surgery. She is here today to discuss adjuvant systemic therapy. \par \par She has a family history of breast cancer in her mother mother diagnosed with breast cancer at 40's. Her brother had esophageal cancer passed away at 45. She had germ line genetic test. The result is pending. \par \par She is  and was menopause at 49. \par left knee replacement. chronic pain joints. [de-identified] : \par 9/9/21andria Esteban is here today for follow up visit and to start chemotherapy. She has stage IA ([id number]) IDC of left breast, G3, ER/[place]/Her-2 negative, s/p left breast lumpectomy and SLNB on 6/29/21 with negative margins. During her previous visit, we discussed adjuvant chemotherapy options. I also talked to her daughter who decided to let her mother take TC regimen x 4 cycles.  She had an unofficial second opinion from her daughter's contact at a [facility]. She had her port placed on 9/3/21 without any complications.  Today, she offers no new breast-related complaints.\par \par 9/30/21andria Esteban is here today for follow up visit and chemotherapy. She has stage IA ([id number]) IDC of left breast, G3, ER/[place]/Her-2 negative, s/p left breast lumpectomy and SLNB on 6/29/21 with negative margins. She started adjuvant chemo with TC and had the 1st cycle three weeks. She had nausea for several days following chemo.  She used the compazine and zofran which caused some headaches. She did not have stomatitis, diarrhea or fever. \par \par 10/20/21andria Esteban is here today for follow up visit and chemotherapy. She has stage IA ([id number]) IDC of left breast, G3, ER/[place]/Her-2 negative, s/p left breast lumpectomy and SLNB on 6/29/21 with negative margins. She started adjuvant chemo with TC and to date she received 2 cycles. She states 2 days after the treatment she developed pruritic and a pruritic rash on her head subsided with Benadryl and PO and Benadryl cream. She had nausea and fatigue for several days following chemo. She used the zofran which relieves nausea. She also reports numbness and tingling to left fingers started after 2nd treatment. She did not have stomatitis, diarrhea or fever. \par \par 11/11/21andria Esteban is here today for follow up visit and chemotherapy. She has stage IA ([id number]) IDC of left breast, G3, ER/[place]/Her-2 negative, s/p left breast lumpectomy and SLNB on 6/29/21 with negative margins. She started adjuvant chemo with TC and to date she received 3 cycles. She states 2 days after the treatment she develops a pruritic erythematous rash on her abdomen and under bilateral breast. The rash subsides with Benadryl and PO and Benadryl cream. She had nausea and fatigue for several days following chemo. She used the zofran which relieves nausea. She also reports numbness and tingling to left fingers. When she went for chemo 10/20 the nurses had difficulty accessing port. She was sent to IR for port evaluation and revision. Port was resutured and working. On 11/2 she was seen by IR for follow up however port was noted to have drainage and pus, patient also had fever. Port was subsequently removed and she was started on Cephalexin 500 mg Q12 x 7 days (last dose tomorrow). A Midline line was inserted to right upper arm. She saw PMD last week for a non productive, chest xray was done and she was dx with bronchitis, she completed Zpak and continues prednisone. Also noted to have fluid in lungs, therefore PMD increased her water pill. She denies cough, fever, chills, SOB.

## 2021-11-15 ENCOUNTER — APPOINTMENT (OUTPATIENT)
Dept: INTERVENTIONAL RADIOLOGY/VASCULAR | Facility: CLINIC | Age: 71
End: 2021-11-15

## 2021-11-17 NOTE — ASSESSMENT
[FreeTextEntry1] : 69 yo female has stage IA ([id number]) IDC of left breast, left breast lumpectomy and SNLB s/p right port a cath removal on 11/3 presents today for third packing.\par \par Advised to keep wound site covered until next packing appointment which is scheduled for Monday 11/22.  \par \par Patient instructed to contact us for any further questions or concerns.

## 2021-11-17 NOTE — HISTORY OF PRESENT ILLNESS
[FreeTextEntry1] : 71 yo female has stage IA ([id number]) IDC of left breast, left breast lumpectomy and SNLB s/p right port a cath removal on 11/3 presents today for third packing.\par \par Patient appears fatigued, states she is not feeling well due to recent chemotherapy treatment, feels nauseous and exhausted. Otherwise offers no complaints or concerns regarding port wound site at this time. Patient denies fevers/chills, or N/V/D. \par \par On examination, port wound site clean and dry. New tissue growth noted within the pocket, healing well. No erythema or swelling present. No tenderness or drainage noted on manipulation. Iodoform packing removed, pocket flushed with 30cc NS. New iodoform packing placed, gauze and Tegaderm placed over site. \par \par Patient had midline placed for last chemotherapy treatment. Patient states at the time of treatment she was told it could not be infused through a midline and a peripheral line was placed. Midline was removed by staff at [facility].\par

## 2021-11-17 NOTE — REVIEW OF SYSTEMS
[Fever] : no fever [Chills] : no chills [Heart Rate Is Slow] : the heart rate was not slow [Heart Rate Is Fast] : the heart rate was not fast [Chest Pain] : no chest pain [Palpitations] : no palpitations [Shortness Of Breath] : no shortness of breath [Wheezing] : no wheezing [Cough] : no cough [SOB on Exertion] : no shortness of breath during exertion [Abdominal Pain] : no abdominal pain [Vomiting] : no vomiting [Constipation] : no constipation [Diarrhea] : no diarrhea [Itching] : no itching [Confused] : no confusion

## 2021-11-17 NOTE — PHYSICAL EXAM
[Alert] : alert [No Acute Distress] : no acute distress [Normal Sclera/Conjunctiva] : normal sclera/conjunctiva [PERRL] : pupils equal, round and reactive to light [No Respiratory Distress] : no respiratory distress [Normal Rate and Effort] : normal respiratory rhythm and effort [No Accessory Muscle Use] : no accessory muscle use [Normal PMI] : the apical impulse was normal [Normal Rate] : heart rate was normal  [No Rash] : no rash [No Skin Lesions] : no skin lesions [Oriented x3] : oriented to person, place, and time [Normal Insight/Judgement] : insight and judgment were intact [Normal Affect] : the affect was normal [Normal Mood] : the mood was normal [Restricted in physically strenuous activity but ambulatory and able to carry out work of a light or sedentary nature] : Restricted in physically strenuous activity but ambulatory and able to carry out work of a light or sedentary nature, e.g., light house work, office work

## 2021-11-19 ENCOUNTER — NON-APPOINTMENT (OUTPATIENT)
Age: 71
End: 2021-11-19

## 2021-11-22 ENCOUNTER — APPOINTMENT (OUTPATIENT)
Dept: INTERVENTIONAL RADIOLOGY/VASCULAR | Facility: CLINIC | Age: 71
End: 2021-11-22

## 2021-11-22 NOTE — HISTORY OF PRESENT ILLNESS
[FreeTextEntry1] : 69 yo female has stage IA ([id number]) IDC of left breast, left breast lumpectomy and SNLB s/p right port a cath removal on 11/3 presents today for fourth packing.\par \par Patient appears well, offers no complaints or concerns at this time. Patient denies fevers/chills, or N/V/D.\par \par On examination, port wound site clean and dry. New tissue growth noted within the pocket, healing well. No erythema or swelling present. No tenderness or drainage noted on manipulation. Iodoform packing removed, pocket flushed with 30cc NS. New iodoform packing placed, gauze and Tegaderm placed over site. \par

## 2021-11-22 NOTE — REVIEW OF SYSTEMS
[Fever] : no fever [Chills] : no chills [Feeling Poorly] : not feeling poorly [Feeling Tired] : not feeling tired [Eye Pain] : no eye pain [Red Eyes] : eyes not red [Heart Rate Is Slow] : the heart rate was not slow [Heart Rate Is Fast] : the heart rate was not fast [Chest Pain] : no chest pain [Palpitations] : no palpitations [Shortness Of Breath] : no shortness of breath [Wheezing] : no wheezing [Cough] : no cough [SOB on Exertion] : no shortness of breath during exertion [Abdominal Pain] : no abdominal pain [Vomiting] : no vomiting [Constipation] : no constipation [Diarrhea] : no diarrhea [Skin Lesions] : no skin lesions [Itching] : no itching [Confused] : no confusion [Dizziness] : no dizziness

## 2021-11-22 NOTE — ASSESSMENT
[FreeTextEntry1] : 71 yo female has stage IA ([id number]) IDC of left breast, left breast lumpectomy and SNLB s/p right port a cath removal on 11/3 presents today for fourth packing.\par \par Advised to keep wound site covered until next packing appointment which is scheduled for Monday 12/6.\par \par Patient instructed to contact us for any further questions or concerns.

## 2021-11-22 NOTE — PHYSICAL EXAM
[Alert] : alert [No Acute Distress] : no acute distress [Well Nourished] : well nourished [Well Developed] : well developed [Normal Sclera/Conjunctiva] : normal sclera/conjunctiva [PERRL] : pupils equal, round and reactive to light [No Respiratory Distress] : no respiratory distress [Normal Rate and Effort] : normal respiratory rhythm and effort [No Accessory Muscle Use] : no accessory muscle use [Normal PMI] : the apical impulse was normal [Normal Rate] : heart rate was normal  [No Rash] : no rash [No Skin Lesions] : no skin lesions [Oriented x3] : oriented to person, place, and time [Normal Insight/Judgement] : insight and judgment were intact [Normal Affect] : the affect was normal [Normal Mood] : the mood was normal [Restricted in physically strenuous activity but ambulatory and able to carry out work of a light or sedentary nature] : Restricted in physically strenuous activity but ambulatory and able to carry out work of a light or sedentary nature, e.g., light house work, office work

## 2021-12-01 ENCOUNTER — NON-APPOINTMENT (OUTPATIENT)
Age: 71
End: 2021-12-01

## 2021-12-02 ENCOUNTER — APPOINTMENT (OUTPATIENT)
Dept: HEMATOLOGY ONCOLOGY | Facility: CLINIC | Age: 71
End: 2021-12-02
Payer: MEDICARE

## 2021-12-02 ENCOUNTER — APPOINTMENT (OUTPATIENT)
Dept: RADIATION ONCOLOGY | Facility: HOSPITAL | Age: 71
End: 2021-12-02
Payer: MEDICARE

## 2021-12-02 VITALS
RESPIRATION RATE: 16 BRPM | DIASTOLIC BLOOD PRESSURE: 62 MMHG | WEIGHT: 218 LBS | TEMPERATURE: 96.6 F | HEART RATE: 105 BPM | SYSTOLIC BLOOD PRESSURE: 129 MMHG | OXYGEN SATURATION: 97 % | BODY MASS INDEX: 44.03 KG/M2

## 2021-12-02 VITALS
HEIGHT: 59 IN | HEART RATE: 106 BPM | BODY MASS INDEX: 44.35 KG/M2 | SYSTOLIC BLOOD PRESSURE: 138 MMHG | RESPIRATION RATE: 14 BRPM | DIASTOLIC BLOOD PRESSURE: 83 MMHG | TEMPERATURE: 98.2 F | WEIGHT: 220 LBS

## 2021-12-02 DIAGNOSIS — Z87.39 PERSONAL HISTORY OF OTHER DISEASES OF THE MUSCULOSKELETAL SYSTEM AND CONNECTIVE TISSUE: ICD-10-CM

## 2021-12-02 LAB
ALBUMIN SERPL ELPH-MCNC: 4.5 G/DL
ALP BLD-CCNC: 57 U/L
ALT SERPL-CCNC: 16 U/L
ANION GAP SERPL CALC-SCNC: 15 MMOL/L
AST SERPL-CCNC: 15 U/L
BILIRUB DIRECT SERPL-MCNC: <0.2 MG/DL
BILIRUB INDIRECT SERPL-MCNC: >0.1 MG/DL
BILIRUB SERPL-MCNC: 0.3 MG/DL
BUN SERPL-MCNC: 25 MG/DL
CALCIUM SERPL-MCNC: 10.2 MG/DL
CHLORIDE SERPL-SCNC: 100 MMOL/L
CO2 SERPL-SCNC: 24 MMOL/L
CREAT SERPL-MCNC: 0.7 MG/DL
GLUCOSE SERPL-MCNC: 109 MG/DL
POTASSIUM SERPL-SCNC: 4.6 MMOL/L
PROT SERPL-MCNC: 7.5 G/DL
SODIUM SERPL-SCNC: 139 MMOL/L

## 2021-12-02 PROCEDURE — 99214 OFFICE O/P EST MOD 30 MIN: CPT

## 2021-12-02 PROCEDURE — 99205 OFFICE O/P NEW HI 60 MIN: CPT

## 2021-12-02 RX ORDER — DEXAMETHASONE 4 MG/1
4 TABLET ORAL
Qty: 4 | Refills: 0 | Status: DISCONTINUED | COMMUNITY
Start: 2021-10-20 | End: 2021-12-02

## 2021-12-02 RX ORDER — CAMPHOR 0.45 %
25 GEL (GRAM) TOPICAL
Qty: 30 | Refills: 0 | Status: DISCONTINUED | COMMUNITY
Start: 2021-10-20 | End: 2021-12-02

## 2021-12-02 NOTE — REVIEW OF SYSTEMS
[Patient Intake Form Reviewed] : Patient intake form was reviewed [Shortness Of Breath] : shortness of breath [Joint Pain] : joint pain [Negative] : Allergic/Immunologic [FreeTextEntry4] : vertigo [FreeTextEntry5] : pain in legs, leg cramps during walks [FreeTextEntry9] : arthritis, joint replacement, swollen joints

## 2021-12-02 NOTE — DISEASE MANAGEMENT
[FreeTextEntry4] : invasive ductal carcinoma of the left breast, ER (pos)/[place]/HER2 negative, upper outer quadrant  [TTNM] : 1c [NTNM] : 0 [MTNM] : 0 [de-identified] : left breast

## 2021-12-02 NOTE — HISTORY OF PRESENT ILLNESS
[FreeTextEntry1] : \par The patient is a 70 year old woman who was recently noted on screening mammogram (4/29/2021) to have an asymmetry in the left breast at 10 o’clock.  Diagnostic mammogram and ultrasound on  5/14/2021 showed a hypoechoic mass, 0.8 X 0.7 X 1.3 cm in the left breast, at 3 o'clock, 10 cm FN.  BI-RADS 4C: Highly suspicious for malignancy.  Recommend US core biopsy. \par \par On 5/20/2021, she had a biopsy of the left breast abnormality at 3 o’clock, 10 cm FN and pathology showed invasive ductal carcinoma, poorly differentiated, associated with lymphocytic reaction.  It was ER/KS positive / HER 2 negative.  Ki-67 index was 85%.  \par \par She also had an MRI of bilateral breasts on 6/19/2021, which showed left breast, at approximately 3:00 approximately 7 cm to the nipple, there is a signal void with adjacent enhancement. This corresponds to the region of recent biopsy which revealed invasive ductal carcinoma. The overall AP span of enhancement is approximately 1.3 cm. Part of this is likely secondary to artifactual blooming. No other regions of abnormal enhancement are identified in the left breast or axilla.\par Right breast, no evidence of abnormal mass or nonmass enhancement in the right breast or axilla.\par Recommendation: Management as clinically appropriate. BI-RADS Category 6: Known Biopsy-Proven Malignancy\par \par On 6/29/2021, She underwent a lumpectomy and sentinel node biopsy.  She was found to have a 11 mm  invasive ductal carcinoma,  G3,without DCIS, ER/[place]/HER 2 negative.  Surgical margins were negative as well as 0/6 negative sentinel lymph nodes.  There was no LVSI/PNI.  \par \par She was started on 9/9/2021 with chemotherapy (4 cycles of TC), managed by Dr. Aj.  Last was 3 weeks ago. Plan for Anastrozole after radiation.  She had an infection of her port which was removed.  Her wound is being packed. \par She is here to discuss radiation.\par  \par Med/Onc: Dr. Aj\par Dr. Bell 1/4/2022\par \par

## 2021-12-02 NOTE — LETTER CLOSING
[Consult Closing:] : Thank you for allowing me to participate in the care of this patient.  If you have any questions, please do not hesitate to contact me. [Sincerely yours,] : Sincerely yours, [FreeTextEntry3] : Steff Owen M.D. \par \par Electronically proofread and signed by:  Steff Owen MD\par Attending, Department of Radiation Medicine\par [facility]\par \par CC: Dr. Bell

## 2021-12-02 NOTE — PHYSICAL EXAM
[Sclera] : the sclera and conjunctiva were normal [Hearing Threshold Finger Rub Not [place]] : hearing was normal [Heart Rate And Rhythm] : heart rate and rhythm were normal [Heart Sounds] : normal S1 and S2 [Nondistended] : nondistended [Abdomen Tenderness] : non-tender [Cervical Lymph Nodes Enlarged Posterior Bilaterally] : posterior cervical [Cervical Lymph Nodes Enlarged Anterior Bilaterally] : anterior cervical [Supraclavicular Lymph Nodes Enlarged Bilaterally] : supraclavicular [Axillary Lymph Nodes Enlarged Bilaterally] : axillary [Normal] : oriented to person, place and time, the affect was normal, the mood was normal and not anxious [de-identified] : She is examined in both the upright and supine positions.  The right breast is unremarkable.   The left breast has a well healed scar.  No palpable mass in either breast.

## 2021-12-06 ENCOUNTER — APPOINTMENT (OUTPATIENT)
Dept: INTERVENTIONAL RADIOLOGY/VASCULAR | Facility: CLINIC | Age: 71
End: 2021-12-06

## 2021-12-06 DIAGNOSIS — T80.219A UNSPECIFIED INFECTION DUE TO CENTRAL VENOUS CATHETER, INITIAL ENCOUNTER: ICD-10-CM

## 2021-12-06 DIAGNOSIS — Z45.2 ENCOUNTER FOR ADJUSTMENT AND MANAGEMENT OF VASCULAR ACCESS DEVICE: ICD-10-CM

## 2021-12-06 NOTE — REVIEW OF SYSTEMS
[Fever] : no fever [Chills] : no chills [Heart Rate Is Slow] : the heart rate was not slow [Heart Rate Is Fast] : the heart rate was not fast [Chest Pain] : no chest pain [Palpitations] : no palpitations [Shortness Of Breath] : no shortness of breath [Wheezing] : no wheezing [Cough] : no cough [SOB on Exertion] : no shortness of breath during exertion [Abdominal Pain] : no abdominal pain [Vomiting] : no vomiting [Constipation] : no constipation [Diarrhea] : no diarrhea [Skin Lesions] : no skin lesions [Itching] : no itching [Confused] : no confusion [Dizziness] : no dizziness

## 2021-12-06 NOTE — PHYSICAL EXAM
[Alert] : alert [No Acute Distress] : no acute distress [Well Nourished] : well nourished [Well Developed] : well developed [Normal Sclera/Conjunctiva] : normal sclera/conjunctiva [PERRL] : pupils equal, round and reactive to light [No Respiratory Distress] : no respiratory distress [Normal Rate and Effort] : normal respiratory rhythm and effort [No Accessory Muscle Use] : no accessory muscle use [Normal PMI] : the apical impulse was normal [Normal Rate] : heart rate was normal  [No Rash] : no rash [No Skin Lesions] : no skin lesions [Oriented x3] : oriented to person, place, and time [Normal Insight/Judgement] : insight and judgment were intact [Normal Affect] : the affect was normal [Normal Mood] : the mood was normal

## 2021-12-06 NOTE — ASSESSMENT
[FreeTextEntry1] : 69 yo female has stage IA ([id number]) IDC of left breast, left breast lumpectomy and SNLB s/p right port a cath removal on 11/3 presents today for fifth packing.\par \par Advised to keep wound site covered until next packing appointment which is scheduled for Monday 12/20. Will consider closing via Dermabond or sutures on next appointment.\par \par Patient instructed to contact us for any further questions or concerns.

## 2021-12-06 NOTE — HISTORY OF PRESENT ILLNESS
[FreeTextEntry1] : 71 yo female has stage IA ([id number]) IDC of left breast, left breast lumpectomy and SNLB s/p right port a cath removal on 11/3 presents today for fifth packing.\par \par Patient appears well, offers no complaints or concerns at this time. Patient denies fevers/chills, or N/V/D.\par \par On examination, port wound site clean and dry. New tissue growth noted within the pocket, healing well. No erythema or swelling present. No tenderness or drainage noted on manipulation. Iodoform packing removed, pocket flushed with 30cc NS. New iodoform packing placed, gauze and Tegaderm placed over site. \par

## 2021-12-08 PROCEDURE — 77263 THER RADIOLOGY TX PLNG CPLX: CPT

## 2021-12-08 PROCEDURE — 77290 THER RAD SIMULAJ FIELD CPLX: CPT | Mod: 26

## 2021-12-08 PROCEDURE — 77332 RADIATION TREATMENT AID(S): CPT | Mod: 26

## 2021-12-09 ENCOUNTER — APPOINTMENT (OUTPATIENT)
Dept: HEMATOLOGY ONCOLOGY | Facility: CLINIC | Age: 71
End: 2021-12-09

## 2021-12-09 NOTE — REVIEW OF SYSTEMS
[Negative] : Allergic/Immunologic [de-identified] : skin rash after treatment resolved. numbness and tingling to left hand.

## 2021-12-09 NOTE — PHYSICAL EXAM
[Fully active, able to carry on all pre-disease performance without restriction] : Status 0 - Fully active, able to carry on all pre-disease performance without restriction [Normal] : affect appropriate [de-identified] : Status post left breast lumpectomy and SLNB. Surgical incisions are healing well. There is no palpable abnormality.  [de-identified] : no bleeding or bruising noted, no rash. Right chest wall dressing in place, drainage noted. Right upper arm midline line removed

## 2021-12-09 NOTE — CONSULT LETTER
[Dear  ___] : Dear  [unfilled], [Courtesy Letter:] : I had the pleasure of seeing your patient, [unfilled], in my office today. [Please see my note below.] : Please see my note below. [Sincerely,] : Sincerely, [DrChano  ___] : Dr. REMY [FreeTextEntry3] : Elvis Aj MD [DrChano ___] : Dr. REMY

## 2021-12-09 NOTE — ASSESSMENT
[FreeTextEntry1] : 69 yo female has stage IA ([id number]) IDC of left breast, G3, ER/[place]/Her-2 negative, s/p left breast lumpectomy and SLNB with negative margins.\par \par Germline genetic panel study (INVITAE) is negative for mutations.\par \par Recommendations:\par -- She completed adjuvant chemo with TC x 4 cycles.\par -- Discussed adjuvant endocrine therapy. Her tumor is ER Positive 20-30%. She is recommended to take adjuvant endocrine therapy with Anastrozole 1 mg daily for 5 years. The benefit and side effects were reviewed. The potential side effects may include but not limit to muscular and skeletal aching, arthralgia, loss of bone density, osteopenia and osteoporosis, a small increase risk of cardiovascular events and slightly increase of hyperlipidemia. We discussed bone health management given Anastrozole may reduce bone density. She is advised to take calcium and vitamin D supplement. Encourage health life style and regular physical activities. She is given a referral for bone density.\par -- Breast exam today did not not reveal abnormal finding. She was referred for diagnostic left breast mammogram and US. A script provided today.\par -- Follow up with Dr Owen as scheduled for adjuvant radiation therapy. \par -- RTO for followup in 2 months. She will call if there is any new concern. \par \par Case was seen and discussed with Dr. Aj  who agreed with the assessment and plan.\par \par

## 2021-12-09 NOTE — HISTORY OF PRESENT ILLNESS
[de-identified] : 71 yo female was referred by Dr. Bell for consultation of breast cancer. Eulalia had b/l screening mammo on 21 which showed asymmetry in the lateral left breast. On 21, left breast dx mammo and US showed hypoechoic mass, measures 8 x 7 x 13 mm  @3N10. 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 \par \par On 21, she had b/l breast MRI which showed Abnormal enhancement in the region of the biopsy corresponding to the site of index cancer. No other areas of abnormal enhancement in either breast\par \par On 21, she underwent left 3 N10 mass, radio frequency seed localized lumpectomy. 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 No lymphovascular invasion nor intraductal component is present. The surgical margins are negative. 6 sentinel lymph nodes are negative for malignancy. AJCC 8th Edition Pathologic Stage: pT1c, p(sn)N0, pMx. Biomarker study were done on surgical specimen. ER, TN and Her-2 (FISH ratio 1.3) are all negative. Ki 67 is 90%. \par \par Eulalia recovered well from surgery. She is here today to discuss adjuvant systemic therapy. \par \par She has a family history of breast cancer in her mother mother diagnosed with breast cancer at 40's. Her brother had esophageal cancer passed away at 45. She had germ line genetic test. The result is pending. \par \par She is  and was menopause at 49. \par left knee replacement. chronic pain joints. [de-identified] : \par 9/9/21andria Esteban is here today for follow up visit and to start chemotherapy. She has stage IA ([id number]) IDC of left breast, G3, ER/OK/Her-2 negative, s/p left breast lumpectomy and SLNB on 6/29/21 with negative margins. During her previous visit, we discussed adjuvant chemotherapy options. I also talked to her daughter who decided to let her mother take TC regimen x 4 cycles.  She had an unofficial second opinion from her daughter's contact at a [facility]. She had her port placed on 9/3/21 without any complications.  Today, she offers no new breast-related complaints.\par \par 9/30/21andria Esteban is here today for follow up visit and chemotherapy. She has stage IA ([id number]) IDC of left breast, G3, ER/OK/Her-2 negative, s/p left breast lumpectomy and SLNB on 6/29/21 with negative margins. She started adjuvant chemo with TC and had the 1st cycle three weeks. She had nausea for several days following chemo.  She used the compazine and zofran which caused some headaches. She did not have stomatitis, diarrhea or fever. \par \par 10/20/21andria Esteban is here today for follow up visit and chemotherapy. She has stage IA ([id number]) IDC of left breast, G3, ER/OK/Her-2 negative, s/p left breast lumpectomy and SLNB on 6/29/21 with negative margins. She started adjuvant chemo with TC and to date she received 2 cycles. She states 2 days after the treatment she developed pruritic and a pruritic rash on her head subsided with Benadryl and PO and Benadryl cream. She had nausea and fatigue for several days following chemo. She used the zofran which relieves nausea. She also reports numbness and tingling to left fingers started after 2nd treatment. She did not have stomatitis, diarrhea or fever. \par \par 11/11/21andria Esteban is here today for follow up visit and chemotherapy. She has stage IA ([id number]) IDC of left breast, G3, ER/OK/Her-2 negative, s/p left breast lumpectomy and SLNB on 6/29/21 with negative margins. She started adjuvant chemo with TC and to date she received 3 cycles. She states 2 days after the treatment she develops a pruritic erythematous rash on her abdomen and under bilateral breast. The rash subsides with Benadryl and PO and Benadryl cream. She had nausea and fatigue for several days following chemo. She used the zofran which relieves nausea. She also reports numbness and tingling to left fingers. When she went for chemo 10/20 the nurses had difficulty accessing port. She was sent to IR for port evaluation and revision. Port was resutured and working. On 11/2 she was seen by IR for follow up however port was noted to have drainage and pus, patient also had fever. Port was subsequently removed and she was started on Cephalexin 500 mg Q12 x 7 days (last dose tomorrow). A Midline line was inserted to right upper arm. She saw PMD last week for a non productive, chest xray was done and she was dx with bronchitis, she completed Zpak and continues prednisone. Also noted to have fluid in lungs, therefore PMD increased her water pill. She denies cough, fever, chills, SOB. \par \par 12/2/21\gaston Esteban is here today for follow up visit and chemotherapy. She has stage IA ([id number]) IDC of left breast, G3, ER/OK/Her-2 negative, s/p left breast lumpectomy and SLNB on 6/29/21 with negative margins. She started adjuvant chemo with TC and to date she received 4 cycles. Rash and itch has subsided since chemotherapy was completed. She still reports numbness and tingling to left fingers. When she went for chemo 10/20 the nurses had difficulty accessing port. She was sent to IR for port evaluation and revision. Port was resutured and working. On 11/2 she was seen by IR for follow up however port was noted to have drainage and pus, patient also had fever. Port was subsequently removed and a Midline line was inserted to right upper arm, which was removed after her last treatment. She denies cough, fever, chills, SOB. She saw Dr Owen today, plan to start RT in 1-2 weeks.

## 2021-12-13 PROCEDURE — 77334 RADIATION TREATMENT AID(S): CPT | Mod: 26

## 2021-12-13 PROCEDURE — 77300 RADIATION THERAPY DOSE PLAN: CPT | Mod: 26

## 2021-12-13 PROCEDURE — 77295 3-D RADIOTHERAPY PLAN: CPT | Mod: 26

## 2021-12-22 ENCOUNTER — APPOINTMENT (OUTPATIENT)
Dept: INTERVENTIONAL RADIOLOGY/VASCULAR | Facility: CLINIC | Age: 71
End: 2021-12-22

## 2021-12-22 PROCEDURE — 77280 THER RAD SIMULAJ FIELD SMPL: CPT | Mod: 26

## 2021-12-27 ENCOUNTER — NON-APPOINTMENT (OUTPATIENT)
Age: 71
End: 2021-12-27

## 2021-12-27 VITALS
WEIGHT: 217 LBS | HEART RATE: 93 BPM | DIASTOLIC BLOOD PRESSURE: 60 MMHG | RESPIRATION RATE: 14 BRPM | BODY MASS INDEX: 43.83 KG/M2 | OXYGEN SATURATION: 100 % | TEMPERATURE: 97.4 F | SYSTOLIC BLOOD PRESSURE: 130 MMHG

## 2021-12-27 PROCEDURE — 77387C: CUSTOM

## 2021-12-27 PROCEDURE — 77427 RADIATION TX MANAGEMENT X5: CPT

## 2021-12-27 NOTE — REVIEW OF SYSTEMS
[Nausea: Grade 0] : Nausea: Grade 0 [Vomiting: Grade 0] : Vomiting: Grade 0 [Fatigue: Grade 0] : Fatigue: Grade 0 [Breast Pain: Grade 0] : Breast Pain: Grade 0 [Dyspnea: Grade 1 - Shortness of breath with moderate exertion] : Dyspnea: Grade 1 - Shortness of breath with moderate exertion [Pruritus: Grade 0] : Pruritus: Grade 0 [Dermatitis Radiation: Grade 0] : Dermatitis Radiation: Grade 0

## 2021-12-28 ENCOUNTER — NON-APPOINTMENT (OUTPATIENT)
Age: 71
End: 2021-12-28

## 2021-12-28 PROCEDURE — 77387C: CUSTOM

## 2021-12-28 NOTE — PHYSICAL EXAM
[Obese] : obese [Sclera] : the sclera and conjunctiva were normal [Hearing Threshold Finger Rub Not [place]] : hearing was normal [] : no respiratory distress [Respiration, Rhythm And Depth] : normal respiratory rhythm and effort [Exaggerated Use Of Accessory Muscles For Inspiration] : no accessory muscle use [Heart Rate And Rhythm] : heart rate and rhythm were normal [Skin Color & Pigmentation] : normal skin color and pigmentation [No Focal Deficits] : no focal deficits [Normal] : oriented to person, place and time, the affect was normal, the mood was normal and not anxious [de-identified] : No skin reaction

## 2021-12-28 NOTE — DISEASE MANAGEMENT
[Pathological] : TNM Stage: p [IB] : IB [FreeTextEntry4] : invasive ductal carcinoma of the left breast, ER (pos)/[place]/HER2 negative, upper outer quadrant  [TTNM] : 1c [NTNM] : 0 [MTNM] : 0 [de-identified] : 851hGy [de-identified] : 5240cGy [de-identified] : left breast

## 2021-12-28 NOTE — HISTORY OF PRESENT ILLNESS
[FreeTextEntry1] : Patient started on XRT today. We reviewed skin care.  She denies breast pain.  No new concerns.

## 2021-12-29 PROCEDURE — 77387C: CUSTOM

## 2021-12-30 PROCEDURE — 77387C: CUSTOM

## 2021-12-31 ENCOUNTER — NON-APPOINTMENT (OUTPATIENT)
Age: 71
End: 2021-12-31

## 2022-01-03 ENCOUNTER — NON-APPOINTMENT (OUTPATIENT)
Age: 72
End: 2022-01-03

## 2022-01-03 VITALS
DIASTOLIC BLOOD PRESSURE: 58 MMHG | SYSTOLIC BLOOD PRESSURE: 135 MMHG | TEMPERATURE: 97.4 F | HEART RATE: 84 BPM | BODY MASS INDEX: 43.85 KG/M2 | RESPIRATION RATE: 14 BRPM | WEIGHT: 217.13 LBS | OXYGEN SATURATION: 98 %

## 2022-01-03 NOTE — PHYSICAL EXAM
[Obese] : obese [Sclera] : the sclera and conjunctiva were normal [Hearing Threshold Finger Rub Not [place]] : hearing was normal [] : no respiratory distress [Respiration, Rhythm And Depth] : normal respiratory rhythm and effort [Exaggerated Use Of Accessory Muscles For Inspiration] : no accessory muscle use [Heart Rate And Rhythm] : heart rate and rhythm were normal [Nail Clubbing] : no clubbing  or cyanosis of the fingernails [Skin Color & Pigmentation] : normal skin color and pigmentation [No Focal Deficits] : no focal deficits [Normal] : oriented to person, place and time, the affect was normal, the mood was normal and not anxious [de-identified] : Minimal erythema in radiation field, no desquamation

## 2022-01-03 NOTE — DISEASE MANAGEMENT
[Pathological] : TNM Stage: p [IB] : IB [FreeTextEntry4] : invasive ductal carcinoma of the left breast, ER (pos)/CT/HER2 negative, upper outer quadrant  [TTNM] : 1c [NTNM] : 0 [MTNM] : 0 [de-identified] : [id number] [de-identified] : 5240cGy [de-identified] : left breast

## 2022-01-03 NOTE — HISTORY OF PRESENT ILLNESS
[FreeTextEntry1] : Patient reports feeling well.  No breast pain.  She notes, left UE discomfort 2/2 position on table as well as her arthritis.  Discomfort is relieved with Aleve.

## 2022-01-04 ENCOUNTER — APPOINTMENT (OUTPATIENT)
Dept: INTERVENTIONAL RADIOLOGY/VASCULAR | Facility: CLINIC | Age: 72
End: 2022-01-04
Payer: MEDICARE

## 2022-01-04 DIAGNOSIS — Z98.890 OTHER SPECIFIED POSTPROCEDURAL STATES: ICD-10-CM

## 2022-01-04 PROCEDURE — 77427 RADIATION TX MANAGEMENT X5: CPT

## 2022-01-04 PROCEDURE — 77387C: CUSTOM

## 2022-01-05 PROCEDURE — 77387C: CUSTOM

## 2022-01-06 PROCEDURE — 77387C: CUSTOM

## 2022-01-07 PROBLEM — Z98.890 HISTORY OF REMOVAL OF PORT-A-CATH: Status: ACTIVE | Noted: 2021-12-27

## 2022-01-07 PROCEDURE — 77387C: CUSTOM

## 2022-01-07 NOTE — PHYSICAL EXAM
[Alert] : alert [No Acute Distress] : no acute distress [Well Nourished] : well nourished [Well Developed] : well developed [Healthy Appearance] : healthy appearance [Normal Sclera/Conjunctiva] : normal sclera/conjunctiva [PERRL] : pupils equal, round and reactive to light [No Respiratory Distress] : no respiratory distress [Normal Rate and Effort] : normal respiratory rhythm and effort [No Accessory Muscle Use] : no accessory muscle use [Normal PMI] : the apical impulse was normal [Normal Rate] : heart rate was normal  [No Rash] : no rash [No Skin Lesions] : no skin lesions [Oriented x3] : oriented to person, place, and time [Normal Insight/Judgement] : insight and judgment were intact [Normal Affect] : the affect was normal [Normal Mood] : the mood was normal [Recent Memory Normal] : recent memory was not impaired [Remote Memory Normal] : remote memory was not impaired [Restricted in physically strenuous activity but ambulatory and able to carry out work of a light or sedentary nature] : Restricted in physically strenuous activity but ambulatory and able to carry out work of a light or sedentary nature, e.g., light house work, office work

## 2022-01-07 NOTE — ASSESSMENT
[FreeTextEntry1] : 69 yo female has stage IA ([id number]) IDC of left breast, left breast lumpectomy and SNLB s/p right port a cath removal on 11/3 presents today for final follow up s/p port removal on 11/3.\par \par Due to natural skin wound closure, do not need to reinforce with sutures or Dermabond. Patient instructed to call or make an appointment if she notices any redness, swelling, tenderness or discharge from the site, although not likely at this point. \par \par No further follow up required.

## 2022-01-07 NOTE — HISTORY OF PRESENT ILLNESS
[FreeTextEntry1] : 71 yo female has stage IA ([id number]) IDC of left breast, left breast lumpectomy and SNLB s/p right port a cath removal on 11/3 presents today for final follow up s/p port removal on 11/3.\par \par Patient appears well, offers no complaints or concerns at this time. Patient denies fevers/chills, or N/V/D.\par \par Gauze and Tegaderm removed from site. On examination, port wound site is clean and dry. New tissue growth noted, primary wound site is closed and healing well. No erythema or swelling present. No tenderness or drainage noted on manipulation.

## 2022-01-10 ENCOUNTER — NON-APPOINTMENT (OUTPATIENT)
Age: 72
End: 2022-01-10

## 2022-01-10 VITALS
DIASTOLIC BLOOD PRESSURE: 57 MMHG | WEIGHT: 215.38 LBS | HEART RATE: 85 BPM | RESPIRATION RATE: 12 BRPM | SYSTOLIC BLOOD PRESSURE: 129 MMHG | TEMPERATURE: 97.5 F | BODY MASS INDEX: 43.5 KG/M2

## 2022-01-10 NOTE — REVIEW OF SYSTEMS
[Fatigue: Grade 1 - Fatigue relieved by rest] : Fatigue: Grade 1 - Fatigue relieved by rest [Breast Pain: Grade 0] : Breast Pain: Grade 0 [Pruritus: Grade 0] : Pruritus: Grade 0 [Skin Hyperpigmentation: Grade 1 - Hyperpigmentation covering <10% BSA; no psychosocial impact] : Skin Hyperpigmentation: Grade 1 - Hyperpigmentation covering <10% BSA; no psychosocial impact [Dermatitis Radiation: Grade 1 - Faint erythema or dry desquamation] : Dermatitis Radiation: Grade 1 - Faint erythema or dry desquamation

## 2022-01-10 NOTE — HISTORY OF PRESENT ILLNESS
[FreeTextEntry1] : Patient reports, feeling "good".  She is moisturizing 2X/day.  She denies breast pain.  She has no new concerns.

## 2022-01-10 NOTE — DISEASE MANAGEMENT
[Pathological] : TNM Stage: p [IB] : IB [FreeTextEntry4] : invasive ductal carcinoma of the left breast, ER (pos)/OK/HER2 negative, upper outer quadrant  [TTNM] : 1c [NTNM] : 0 [MTNM] : 0 [de-identified] : [id number] [de-identified] : 5240cGy [de-identified] : left breast

## 2022-01-10 NOTE — PHYSICAL EXAM
[Obese] : obese [Sclera] : the sclera and conjunctiva were normal [Hearing Threshold Finger Rub Not [place]] : hearing was normal [] : no respiratory distress [Respiration, Rhythm And Depth] : normal respiratory rhythm and effort [Exaggerated Use Of Accessory Muscles For Inspiration] : no accessory muscle use [Heart Rate And Rhythm] : heart rate and rhythm were normal [No Focal Deficits] : no focal deficits [Normal] : oriented to person, place and time, the affect was normal, the mood was normal and not anxious [de-identified] : mild- mod hyperpigmentation - no desquamation

## 2022-01-11 PROCEDURE — 77427 RADIATION TX MANAGEMENT X5: CPT

## 2022-01-11 PROCEDURE — 77387C: CUSTOM

## 2022-01-12 PROCEDURE — 77387C: CUSTOM

## 2022-01-13 PROCEDURE — 77387C: CUSTOM

## 2022-01-14 PROCEDURE — 77387C: CUSTOM

## 2022-01-18 ENCOUNTER — NON-APPOINTMENT (OUTPATIENT)
Age: 72
End: 2022-01-18

## 2022-01-18 VITALS
BODY MASS INDEX: 43.12 KG/M2 | DIASTOLIC BLOOD PRESSURE: 70 MMHG | WEIGHT: 213.5 LBS | HEART RATE: 87 BPM | OXYGEN SATURATION: 100 % | RESPIRATION RATE: 14 BRPM | TEMPERATURE: 97 F | SYSTOLIC BLOOD PRESSURE: 128 MMHG

## 2022-01-18 NOTE — PHYSICAL EXAM
[Obese] : obese [Sclera] : the sclera and conjunctiva were normal [Hearing Threshold Finger Rub Not [place]] : hearing was normal [] : no respiratory distress [Respiration, Rhythm And Depth] : normal respiratory rhythm and effort [Exaggerated Use Of Accessory Muscles For Inspiration] : no accessory muscle use [Heart Rate And Rhythm] : heart rate and rhythm were normal [Nail Clubbing] : no clubbing  or cyanosis of the fingernails [Motor Tone] : muscle strength and tone were normal [Skin Color & Pigmentation] : normal skin color and pigmentation [No Focal Deficits] : no focal deficits [Normal] : oriented to person, place and time, the affect was normal, the mood was normal and not anxious [de-identified] : Moderate erythema and hyperpigmentation on left breast.  No desquamation

## 2022-01-18 NOTE — DISEASE MANAGEMENT
[Pathological] : TNM Stage: p [IB] : IB [FreeTextEntry4] : invasive ductal carcinoma of the left breast, ER (pos)/FL/HER2 negative, upper outer quadrant  [TTNM] : 1c [NTNM] : 0 [MTNM] : 0 [de-identified] : [id number] [de-identified] : 5240cGy [de-identified] : left breast

## 2022-01-19 PROCEDURE — 77387C: CUSTOM

## 2022-01-19 PROCEDURE — 77334 RADIATION TREATMENT AID(S): CPT | Mod: 26

## 2022-01-19 PROCEDURE — 77427 RADIATION TX MANAGEMENT X5: CPT

## 2022-01-19 PROCEDURE — 77280 THER RAD SIMULAJ FIELD SMPL: CPT | Mod: 26

## 2022-01-20 PROCEDURE — 77387C: CUSTOM

## 2022-01-21 PROCEDURE — 77387C: CUSTOM

## 2022-01-24 ENCOUNTER — NON-APPOINTMENT (OUTPATIENT)
Age: 72
End: 2022-01-24

## 2022-01-24 VITALS
DIASTOLIC BLOOD PRESSURE: 62 MMHG | TEMPERATURE: 97.2 F | HEART RATE: 78 BPM | WEIGHT: 214 LBS | RESPIRATION RATE: 14 BRPM | OXYGEN SATURATION: 98 % | BODY MASS INDEX: 43.22 KG/M2 | SYSTOLIC BLOOD PRESSURE: 132 MMHG

## 2022-01-24 DIAGNOSIS — R23.4 CHANGES IN SKIN TEXTURE: ICD-10-CM

## 2022-01-24 PROCEDURE — 77387C: CUSTOM

## 2022-01-24 NOTE — REVIEW OF SYSTEMS
[Fatigue: Grade 1 - Fatigue relieved by rest] : Fatigue: Grade 1 - Fatigue relieved by rest [Breast Pain: Grade 0] : Breast Pain: Grade 0 [Pruritus: Grade 0] : Pruritus: Grade 0 [Skin Hyperpigmentation: Grade 1 - Hyperpigmentation covering <10% BSA; no psychosocial impact] : Skin Hyperpigmentation: Grade 1 - Hyperpigmentation covering <10% BSA; no psychosocial impact [Dermatitis Radiation: Grade 2 - Moderate to brisk erythema; patchy moist desquamation, mostly confined to skin folds and creases; moderate edema] : Dermatitis Radiation: Grade 2 - Moderate to brisk erythema; patchy moist desquamation, mostly confined to skin folds and creases; moderate edema

## 2022-01-24 NOTE — HISTORY OF PRESENT ILLNESS
[FreeTextEntry1] : Patient reports mild desquamation in IM breast fold.  She denies pain.  She moisturizes 2-3X /day.  She is tolerating Anastrozole well.  She has no new concerns.

## 2022-01-24 NOTE — PHYSICAL EXAM
[Obese] : obese [Sclera] : the sclera and conjunctiva were normal [Hearing Threshold Finger Rub Not [place]] : hearing was normal [] : no respiratory distress [Respiration, Rhythm And Depth] : normal respiratory rhythm and effort [Exaggerated Use Of Accessory Muscles For Inspiration] : no accessory muscle use [Heart Rate And Rhythm] : heart rate and rhythm were normal [Abdomen Soft] : soft [Abdomen Tenderness] : non-tender [Skin Color & Pigmentation] : normal skin color and pigmentation [No Focal Deficits] : no focal deficits [Normal] : oriented to person, place and time, the affect was normal, the mood was normal and not anxious [de-identified] : Moderate erythema on treated breast.  Dry desquamation under left inframammary fold

## 2022-01-24 NOTE — DISEASE MANAGEMENT
[Pathological] : TNM Stage: p [IB] : IB [FreeTextEntry4] : invasive ductal carcinoma of the left breast, ER (pos)/[place]/HER2 negative, upper outer quadrant  [TTNM] : 1c [NTNM] : 0 [MTNM] : 0 [de-identified] : 4990cGy [de-identified] : 5240cGy [de-identified] : left breast

## 2022-01-25 ENCOUNTER — OUTPATIENT (OUTPATIENT)
Dept: OUTPATIENT SERVICES | Facility: HOSPITAL | Age: 72
LOS: 1 days | Discharge: HOME | End: 2022-01-25
Payer: MEDICARE

## 2022-01-25 DIAGNOSIS — Z96.652 PRESENCE OF LEFT ARTIFICIAL KNEE JOINT: Chronic | ICD-10-CM

## 2022-01-25 DIAGNOSIS — C50.412 MALIGNANT NEOPLASM OF UPPER-OUTER QUADRANT OF LEFT FEMALE BREAST: ICD-10-CM

## 2022-01-25 DIAGNOSIS — Z98.890 OTHER SPECIFIED POSTPROCEDURAL STATES: Chronic | ICD-10-CM

## 2022-01-25 PROCEDURE — 77387C: CUSTOM

## 2022-02-03 ENCOUNTER — NON-APPOINTMENT (OUTPATIENT)
Age: 72
End: 2022-02-03

## 2022-02-03 ENCOUNTER — APPOINTMENT (OUTPATIENT)
Dept: BREAST CENTER | Facility: CLINIC | Age: 72
End: 2022-02-03
Payer: MEDICARE

## 2022-02-03 VITALS
SYSTOLIC BLOOD PRESSURE: 165 MMHG | DIASTOLIC BLOOD PRESSURE: 84 MMHG | HEIGHT: 59 IN | WEIGHT: 214 LBS | TEMPERATURE: 97.5 F | BODY MASS INDEX: 43.14 KG/M2

## 2022-02-03 PROCEDURE — 93702 BIS XTRACELL FLUID ANALYSIS: CPT

## 2022-02-03 PROCEDURE — 99213 OFFICE O/P EST LOW 20 MIN: CPT

## 2022-02-03 NOTE — PAST MEDICAL HISTORY
[History of Hormone Replacement Treatment] : has no history of hormone replacement treatment [FreeTextEntry5] : denies  [FreeTextEntry6] : denies [FreeTextEntry7] : yes in past x 15 years, stopped >30 years prior  [FreeTextEntry8] : denies

## 2022-02-03 NOTE — HISTORY OF PRESENT ILLNESS
[FreeTextEntry1] : Graham is a 70 F with a left breast IDC,  ER weakly positive, ID neg, Her 2 neg, [id number], stage 1 breast cancer, s/p L WLE and SLN Bx on 2021. \par \par 2021 -- L WLE and SLN Bx \par 1. L breast mass @3N10 \par -IDC, poorly differentiated, medullary features \par -no intraductal component\par -no LVI \par -ER weakly positive, ID neg, Her 2 neg\par -T = 1.1 cm \par -closest margins was 0.5mm from superior margin and 1 mm from anterior margin\par -additional margins were all negative for carcinoma (superior, inferior, anterior, medial, lateral, posterior) \par \par 2. L axillary sentinel lymph node biopsy \par -0 LN positive for mets \par -[id number]\par \par Graham presented today with her friend, Silvia who was present for the entirety of the consultation. \par \par She has no breast related complaints at this time.  She denies any breast pain, has not palpated any new palpable masses in either breast and denies any nipple discharge or retraction.  \par \par Her work up was as follows: \par 2021 -- b/l screening mammogram \par -R: -no suspicious mass, microcalcifications or architectural distortion\par -L: in lateral breast, asymmetry present \par BIRADS 0 \par \par 2021 -- L dx mammogram and US \par -scattered areas of fibroglandular densities\par -L: @3:00, persistent mass \par L US \par -@3N10, hypoechoic mass, measures 8 x 7 x 13 mm --> BIOPSY \par BIRADS 4c \par \par 2021 -- L US CNBx @3N10 (coil clip) \par -IDC, SBR 3, poorly differentiated with associated lymphocytic reaction \par -ER weakly positive, ID neg (Toni 5/0), Her 2 neg \par \par HISTORICAL RISK FACTORS: \par -no prior breast biopsies, history of L breast lumpectomy for a cyst in the  \par -family history of breast cancer in her mother at age 52\par -, age at first live birth was 22 \par -prior OCP use x 15 years, stopped >30 years prior \par -no gyn surgeries\par \par INTERVAL HISTORY: \gaston Esteban returns for her FIRST POST OP VISIT, s/p L WLE and SLN Bx on 2021. \par \par Her final pathology revealed a 1.1 cm IDC, poorly differentiated, with medullary features, ER weakly positive, ID neg, Her 2 neg, no LVI, no intraductal component, negative surgical margins and 6 benign left axillary sentinel lymph nodes for a [id number] breast cancer. \par \par She is healing well from her recent surgery.  She has some soreness at her surgical site, but denies any pain, redness, fevers or chills. \par \par 2022 --\par GRAHAM STUBBS is a 71 year old female patient who presents today in follow up for stage IA ([id number]) IDC of left breast, G3, ER/ID/Her-2 negative, s/p left breast lumpectomy and SLNB on 21.\par \par Dr. Elvis Aj - completed adjuvant chemo with TC x 4 cycles.\par She has been started on adjuvant endocrine therapy with Anastrozole 1 mg daily.\par She has also recently completed radiation therapy with Dr. Steff Owen (2021 - 2022).\par \par Her most recent imaging is as follows:\par 2021 - Left Uni Dx Mammo & Sono:\par -There are scattered areas of fibroglandular density\par -Postlumpectomy changes are seen in the outer left breast.\par -No suspicious abnormalities were seen sonographically in the left breast. Postsurgical changes are seen in the left breast at 3:00 location 10 cm from the nipple.\par Patient is due for a bilateral diagnostic mammogram in 2022\par BI-RADS 2: BENIGN \par

## 2022-02-03 NOTE — PHYSICAL EXAM
[Normocephalic] : normocephalic [Atraumatic] : atraumatic [EOMI] : extra ocular movement intact [No Supraclavicular Adenopathy] : no supraclavicular adenopathy [No Cervical Adenopathy] : no cervical adenopathy [No dominant masses] : no dominant masses in right breast  [No dominant masses] : no dominant masses left breast [No Nipple Retraction] : no left nipple retraction [No Nipple Discharge] : no left nipple discharge [No Axillary Lymphadenopathy] : no left axillary lymphadenopathy [Soft] : abdomen soft [Not Tender] : non-tender [No Edema] : no edema [No Rashes] : no rashes [No Ulceration] : no ulceration [de-identified] : no suspicious abnormalities palpated within either breast, no lymphadenopathy  [de-identified] : both surgical incisions are healing well; palpable area of scar tissue noted - approx 2x2cm.  [de-identified] : SOZO: 23.5 --> 12.9 --> 14.5 [de-identified] : some hyperpigmentation of the left breast secondary to RT noted.

## 2022-02-03 NOTE — DATA REVIEWED
[FreeTextEntry1] :  \par Dec 13, 2021 \par MAMMO TOMOSYNTHESIS DIAGNOSTIC LEFT\par Clinical Breast Exam: Patient does report clinical breast exam in the last year.\par \par Clinical Indication: Patient is status post lumpectomy on the left breast. Patient has history of having chemotherapy. Family history of mother with breast cancer.\par \par  \par \par Compared to: 05/14/2021, 04/29/2021, 01/14/2020, 12/07/2018, 11/29/2017, and 10/05/2016\par Tomosynthesis and 2D imaging of the breast(s) were performed.  Current study was also evaluated with a computer aided detection (CAD) system.\par \par  \par \par Breast composition: There are scattered areas of fibroglandular density\par \par No significant masses, calcifications, or other findings are seen in the left breast. Postlumpectomy changes are seen in the outer left breast.\par \par Electronic Signature: I personally reviewed the images and agree with this report. Final Report: Dictated by  and Signed by Attending Dang Sam MD 12/13/2021 12:28 PM\par IMPRESSION: \par \par There is no mammographic evidence of malignancy.  Patient is due for a bilateral diagnostic mammogram in April 2022\par \par  \par \par As further detailed above, a 6 month follow-up DIAGNOSTIC imaging exam of the left breast(s) is recommended. The patient will be sent a normal letter.\par \par BI-RADS 2: BENIGN \par  \par Dec 13, 2021 \par \par US BREAST COMPLETE LEFT\par Compared to: 05/14/2021\par Ultrasound evaluation was performed including examination of all four quadrants of the breast(s) and the retroareolar region(s).\par \par  \par \par No suspicious abnormalities were seen sonographically in the left breast. Postsurgical changes are seen in the left breast at 3:00 location 10 cm from the nipple.\par \par  \par \par Electronic Signature: I personally reviewed the images and agree with this report. Final Report: Dictated by  and Signed by Attending Dang Sam MD 12/13/2021 12:52 PM\par \par  \par \par IMPRESSION: \par There is no sonographic evidence of malignancy. Patient is due for a bilateral diagnostic mammogram in April 2022\par \par As further detailed above, a 6 month follow-up DIAGNOSTIC imaging exam of both breast(s) is recommended. The patient will be sent a normal letter.\par BI-RADS 2: BENIGN \par

## 2022-02-03 NOTE — ASSESSMENT
[FreeTextEntry1] : Eulalia is a 71 F with a left breast IDC,  ER weakly positive, VT neg, Her 2 neg, [id number], stage 1 breast cancer, s/p L WLE and SLN Bx on 6/29/2021. \par \par 6/29/2021 -- L WLE and SLN Bx \par 1. L breast mass @3N10 \par -IDC, poorly differentiated, medullary features \par -no intraductal component\par -no LVI \par -ER weakly positive, VT neg, Her 2 neg\par -T = 1.1 cm \par -closest margins was 0.5mm from superior margin and 1 mm from anterior margin\par -additional margins were all negative for carcinoma (superior, inferior, anterior, medial, lateral, posterior) \par \par 2. L axillary sentinel lymph node biopsy \par -0/6 LN positive for mets \par -[id number]\par \par On physical exam, no suspicious abnormalities palpated within either breast, no lymphadenopathy. left breast - both surgical incisions are healing well; palpable area of scar tissue noted - approx 2x2cm. \par \par Dr. Elvis Aj - completed adjuvant chemo with TC x 4 cycles.\par She has been started on adjuvant endocrine therapy with Anastrozole 1 mg daily.\par She has also recently completed radiation therapy with Dr. Steff Owen (12/27/2021 - 01/25/2022).\par \par Her most recent imaging is as follows:\par 12/13/2021 - Left Uni Dx Mammo & Sono:\par -There are scattered areas of fibroglandular density\par -Postlumpectomy changes are seen in the outer left breast.\par -No suspicious abnormalities were seen sonographically in the left breast. Postsurgical changes are seen in the left breast at 3:00 location 10 cm from the nipple.\par Patient is due for a bilateral diagnostic mammogram in April 2022\par BI-RADS 2: BENIGN \par \par \par Her final pathology revealed a 1.1 cm IDC, poorly differentiated, with medullary features, ER weakly positive, VT neg, Her 2 neg, no LVI, no intraductal component, negative surgical margins and 6 benign left axillary sentinel lymph nodes for a [id number] breast cancer. \par \par \par She will be due for a B/L Dx Mammo in April 2022. This will be scheduled for her today.  She can follow up after for a CBE. \par \par \par \par AS REVIEW: \par Her most recent imaging was a b/l screening mammogram and subsequent L dx mammogram and US On 4/29/2021 and 5/14/2021 which revealed @3N10, in her left breast, her biopsy proven cancer, measuring 8 x 7 x 13 mm. \par \par We have discussed her new diagnosis of breast cancer.  She is currently a stage 1 breast cancer, based off AJCC 8th edition.  We discussed her surgical and other treatment options at this time. \par \par In regards to her left sided breast cancer, she is a great candidate for either breast conservation surgery or a mastectomy.  Her lesion is located @3N10   based off the US so she is a candidate for a nipple sparing mastectomy.  However, there is no difference in survival between a lumpectomy + radiation therapy and a mastectomy.  However the rate of local recurrence is slightly higher with the lumpectomy. The rate of recurrence with a mastectomy is not zero, however, and is likely closer to 4-9%.  \par \par Regardless of if she chooses a mastectomy or a lumpectomy, she would require an evaluation of her axillary lymph nodes via a sentinel lymph node biopsy.  This is done by injecting the perioareolar breast tissue with dye prior to the surgery and removing 1-5 lymph nodes that are the first to drain the breast.  \par \par At this time, she is leaning towards undergoing breast conservation therapy with a LUMPECTOMY.  Her left breast lesion is not palpable on clinical exam, so she will need a radiofrequency guided lumpectomy on the left breast.  In the interim, we will obtain a bilateral breast MRI to further evaluate the extent of her disease. \par \par We did briefly discuss the different radiation options.  If she got a mastectomy, she would likely only need radiation therapy if she had a lot of positive margins. If she undergoes a lumpectomy, she is a candidate for both partial breast irradiation and whole breast irradiation.  We briefly discussed the differences between these two modalities.  \par \par In regards to systemic therapy, we briefly discussed both chemotherapy and endocrine therapy. If the tumor is larger than 1 cm and her 2 negative, we would likely need to send an additional study on her tumor sample, called an oncotype DX to make the determination regarding if chemotherapy would help her.  At the completion of all these treatments, she should get endocrine therapy, at current time she would need an AI, for a total of at least 5 years, although her ER is only weakly positive so she may not derive as much benefit from the endocrine therapy.  \par \par Per ASBrS consensus guidelines, any patient with a diagnosis of breast cancer should be offered panel genetic testing as 10% of these patients were found to have a mutation. In addition, her mother also had breast cancer, diagnosed at age 52. THis was offered to her and she would like to proceed with panel genetic testing.  Her blood was drawn today.\par \par All of her questions were answered.  She knows to call with any further questions or concerns.\par \par PLAN\par -SOZO: 23.5 --> 12.9 --> 14.5 this is a change from her baseline - she has been referred to lymphedema specialist for prevention measures - uses compression sleeve/gauntlet.\par -[facility] panel genetic testing -- blood drawn -- results negative \par -Continue f/u with Med Onc as scheduled.\par -Continue f/u with Rad Onc as scheduled.\par -B/L Dx Mammo - April 2022.\par -f/up after

## 2022-02-10 ENCOUNTER — OUTPATIENT (OUTPATIENT)
Dept: OUTPATIENT SERVICES | Facility: HOSPITAL | Age: 72
LOS: 1 days | Discharge: HOME | End: 2022-02-10

## 2022-02-10 ENCOUNTER — APPOINTMENT (OUTPATIENT)
Dept: HEMATOLOGY ONCOLOGY | Facility: CLINIC | Age: 72
End: 2022-02-10
Payer: MEDICARE

## 2022-02-10 VITALS
DIASTOLIC BLOOD PRESSURE: 72 MMHG | HEIGHT: 59 IN | HEART RATE: 83 BPM | RESPIRATION RATE: 14 BRPM | SYSTOLIC BLOOD PRESSURE: 155 MMHG | WEIGHT: 215 LBS | BODY MASS INDEX: 43.34 KG/M2 | TEMPERATURE: 98.8 F

## 2022-02-10 DIAGNOSIS — Z98.890 OTHER SPECIFIED POSTPROCEDURAL STATES: Chronic | ICD-10-CM

## 2022-02-10 DIAGNOSIS — G61.81 CHRONIC INFLAMMATORY DEMYELINATING POLYNEURITIS: ICD-10-CM

## 2022-02-10 DIAGNOSIS — Z96.652 PRESENCE OF LEFT ARTIFICIAL KNEE JOINT: Chronic | ICD-10-CM

## 2022-02-10 PROCEDURE — 99214 OFFICE O/P EST MOD 30 MIN: CPT

## 2022-02-12 NOTE — PHYSICAL EXAM
[Fully active, able to carry on all pre-disease performance without restriction] : Status 0 - Fully active, able to carry on all pre-disease performance without restriction [Normal] : affect appropriate [de-identified] : Status post left breast lumpectomy and SLNB. Surgical incisions are healing well. There is no palpable abnormality.

## 2022-02-12 NOTE — ASSESSMENT
[FreeTextEntry1] : 71 yo female has stage IA ([id number]) IDC of left breast, G3, ER/[place]/Her-2 negative, s/p left breast lumpectomy and SLNB with negative margins.\par \par Germline genetic panel study ([facility]) is negative for mutations.\par \par Recommendations:\par -- Completed adjuvant chemo with TC x 4 cycles in 11/2021.\par -- S/P adjuvant breast radiotherapy 5240cGy between 12/27/21 to 1/25/22. \par -- Breast exam today shows residual skin reaction to radiatio. There is no palpable abnormality. She is scheduled for repeat dx mammo and US in 4/2022. \par -- Continue Anastrozole daily. \par -- Discussed bone health management given Anastrozole may reduce bone density. She is advised to take calcium and vitamin D supplement. Encourage health life style and regular physical activities. She is given a referral for bone density.\par -- Follow up with Dr. Bell and Dr. Owen as directed.  \par -- RTO for followup in 3 months. She will call if there is any new concern. \par \par Case was seen and discussed with Dr. Aj  who agreed with the assessment and plan.\par \par

## 2022-02-12 NOTE — HISTORY OF PRESENT ILLNESS
[de-identified] : 71 yo female was referred by Dr. Bell for consultation of breast cancer. Eulalia had b/l screening mammo on 21 which showed asymmetry in the lateral left breast. On 21, left breast dx mammo and US showed hypoechoic mass, measures 8 x 7 x 13 mm  @3N10. 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 \par \par On 21, she had b/l breast MRI which showed Abnormal enhancement in the region of the biopsy corresponding to the site of index cancer. No other areas of abnormal enhancement in either breast\par \par On 21, she underwent left 3 N10 mass, radio frequency seed localized lumpectomy. 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 No lymphovascular invasion nor intraductal component is present. The surgical margins are negative. 6 sentinel lymph nodes are negative for malignancy. AJCC 8th Edition Pathologic Stage: pT1c, p(sn)N0, pMx. Biomarker study were done on surgical specimen. ER, MA and Her-2 (FISH ratio 1.3) are all negative. Ki 67 is 90%. \par \par Eulalia recovered well from surgery. She is here today to discuss adjuvant systemic therapy. \par \par She has a family history of breast cancer in her mother mother diagnosed with breast cancer at 40's. Her brother had esophageal cancer passed away at 45. She had germ line genetic test. The result is pending. \par \par She is  and was menopause at 49. \par left knee replacement. chronic pain joints. [de-identified] : \par 9/9/21andria Esteban is here today for follow up visit and to start chemotherapy. She has stage IA ([id number]) IDC of left breast, G3, ER/HI/Her-2 negative, s/p left breast lumpectomy and SLNB on 6/29/21 with negative margins. During her previous visit, we discussed adjuvant chemotherapy options. I also talked to her daughter who decided to let her mother take TC regimen x 4 cycles.  She had an unofficial second opinion from her daughter's contact at a [facility]. She had her port placed on 9/3/21 without any complications.  Today, she offers no new breast-related complaints.\par \par 9/30/21andria Esteban is here today for follow up visit and chemotherapy. She has stage IA ([id number]) IDC of left breast, G3, ER/HI/Her-2 negative, s/p left breast lumpectomy and SLNB on 6/29/21 with negative margins. She started adjuvant chemo with TC and had the 1st cycle three weeks. She had nausea for several days following chemo.  She used the compazine and zofran which caused some headaches. She did not have stomatitis, diarrhea or fever. \par \par 10/20/21andria Esteban is here today for follow up visit and chemotherapy. She has stage IA ([id number]) IDC of left breast, G3, ER/HI/Her-2 negative, s/p left breast lumpectomy and SLNB on 6/29/21 with negative margins. She started adjuvant chemo with TC and to date she received 2 cycles. She states 2 days after the treatment she developed pruritic and a pruritic rash on her head subsided with Benadryl and PO and Benadryl cream. She had nausea and fatigue for several days following chemo. She used the zofran which relieves nausea. She also reports numbness and tingling to left fingers started after 2nd treatment. She did not have stomatitis, diarrhea or fever. \par \par 11/11/21andria Esteban is here today for follow up visit and chemotherapy. She has stage IA ([id number]) IDC of left breast, G3, ER/HI/Her-2 negative, s/p left breast lumpectomy and SLNB on 6/29/21 with negative margins. She started adjuvant chemo with TC and to date she received 3 cycles. She states 2 days after the treatment she develops a pruritic erythematous rash on her abdomen and under bilateral breast. The rash subsides with Benadryl and PO and Benadryl cream. She had nausea and fatigue for several days following chemo. She used the zofran which relieves nausea. She also reports numbness and tingling to left fingers. When she went for chemo 10/20 the nurses had difficulty accessing port. She was sent to IR for port evaluation and revision. Port was resutured and working. On 11/2 she was seen by IR for follow up however port was noted to have drainage and pus, patient also had fever. Port was subsequently removed and she was started on Cephalexin 500 mg Q12 x 7 days (last dose tomorrow). A Midline line was inserted to right upper arm. She saw PMD last week for a non productive, chest xray was done and she was dx with bronchitis, she completed Zpak and continues prednisone. Also noted to have fluid in lungs, therefore PMD increased her water pill. She denies cough, fever, chills, SOB. \par \par 12/2/21andria Stevensonie is here today for follow up visit and chemotherapy. She has stage IA ([id number]) IDC of left breast, G3, ER/HI/Her-2 negative, s/p left breast lumpectomy and SLNB on 6/29/21 with negative margins. She started adjuvant chemo with TC and to date she received 4 cycles. Rash and itch has subsided since chemotherapy was completed. She still reports numbness and tingling to left fingers. When she went for chemo 10/20 the nurses had difficulty accessing port. She was sent to IR for port evaluation and revision. Port was resutured and working. On 11/2 she was seen by IR for follow up however port was noted to have drainage and pus, patient also had fever. Port was subsequently removed and a Midline line was inserted to right upper arm, which was removed after her last treatment. She denies cough, fever, chills, SOB. She saw Dr Owen today, plan to start RT in 1-2 weeks. \par \par 2/10/22andria Esteban is here today for follow up visit and chemotherapy. She has stage IA ([id number]) IDC of left breast, G3, ER/HI/Her-2 negative, s/p left breast lumpectomy and SLNB on 6/29/21 with negative margins. She completed adjuvant chemo with TC 11/2021 and radiation #20 on 1/31/20222 with Dr. Owen.  She stated on Anastrozole in 12/2021 and tolerating it well. She saw Dr. Bell recently and c/o neuropathy to her left hand/arm.  She was instructed to get medication from Dr. Aj.  \par \par

## 2022-02-12 NOTE — CONSULT LETTER
[Dear  ___] : Dear  [unfilled], [Courtesy Letter:] : I had the pleasure of seeing your patient, [unfilled], in my office today. [Please see my note below.] : Please see my note below. [Sincerely,] : Sincerely, [DrChano  ___] : Dr. REMY [DrChano ___] : Dr. REMY [FreeTextEntry3] : Elvis Aj MD

## 2022-02-12 NOTE — REVIEW OF SYSTEMS
[Negative] : Allergic/Immunologic [de-identified] : skin rash after treatment resolved. numbness and tingling to left hand.

## 2022-02-14 DIAGNOSIS — Z51.81 ENCOUNTER FOR THERAPEUTIC DRUG LEVEL MONITORING: ICD-10-CM

## 2022-02-14 DIAGNOSIS — G62.9 POLYNEUROPATHY, UNSPECIFIED: ICD-10-CM

## 2022-02-14 DIAGNOSIS — C50.512 MALIGNANT NEOPLASM OF LOWER-OUTER QUADRANT OF LEFT FEMALE BREAST: ICD-10-CM

## 2022-03-09 ENCOUNTER — APPOINTMENT (OUTPATIENT)
Dept: RADIATION ONCOLOGY | Facility: HOSPITAL | Age: 72
End: 2022-03-09
Payer: MEDICARE

## 2022-03-09 VITALS
SYSTOLIC BLOOD PRESSURE: 139 MMHG | BODY MASS INDEX: 43.22 KG/M2 | WEIGHT: 214 LBS | RESPIRATION RATE: 14 BRPM | OXYGEN SATURATION: 99 % | HEART RATE: 77 BPM | TEMPERATURE: 96.6 F | DIASTOLIC BLOOD PRESSURE: 63 MMHG

## 2022-03-09 DIAGNOSIS — Z79.811 LONG TERM (CURRENT) USE OF AROMATASE INHIBITORS: ICD-10-CM

## 2022-03-09 DIAGNOSIS — Z95.828 PRESENCE OF OTHER VASCULAR IMPLANTS AND GRAFTS: ICD-10-CM

## 2022-03-09 PROCEDURE — 99024 POSTOP FOLLOW-UP VISIT: CPT

## 2022-03-09 NOTE — DISEASE MANAGEMENT
[Pathological] : TNM Stage: p [IB] : IB [FreeTextEntry4] : invasive ductal carcinoma of the left breast, ER (pos)/[place]/HER2 negative, upper outer quadrant  [TTNM] : 1c [NTNM] : 0 [MTNM] : 0 [de-identified] : 5240cGy [de-identified] : 5240cGy [de-identified] : left breast

## 2022-03-09 NOTE — PHYSICAL EXAM
[Obese] : obese [Sclera] : the sclera and conjunctiva were normal [Hearing Threshold Finger Rub Not [place]] : hearing was normal [Heart Rate And Rhythm] : heart rate and rhythm were normal [Heart Sounds] : normal S1 and S2 [Murmurs] : no murmurs present [Edema] : no peripheral edema present [No Discharge] : no discharge [No Masses] : no breast masses were palpable [Abdomen Soft] : soft [Nondistended] : nondistended [Abdomen Tenderness] : non-tender [Cervical Lymph Nodes Enlarged Posterior Bilaterally] : posterior cervical [Cervical Lymph Nodes Enlarged Anterior Bilaterally] : anterior cervical [Supraclavicular Lymph Nodes Enlarged Bilaterally] : supraclavicular [Nail Clubbing] : no clubbing  or cyanosis of the fingernails [Motor Tone] : muscle strength and tone were normal [No Focal Deficits] : no focal deficits [Motor Exam] : the motor exam was normal [Normal] : oriented to person, place and time, the affect was normal, the mood was normal and not anxious [___] :  no erythema [Enlargement Of The Left Breast] : no swelling [Tenderness Of The Left Breast] : no tenderness [Breast Reconstruction Left] : no breast reconstruction [Axillary Lymph Nodes Enlarged Bilaterally] : no enlarged nodes [de-identified] : mild residual hyperpigmentation  [de-identified] : u

## 2022-03-09 NOTE — HISTORY OF PRESENT ILLNESS
[FreeTextEntry1] : GRAHAM STUBBS returns to clinic in follow up visit.  As you know, GRAHAM STUBBS is a 71 year old female with invasive ductal carcinoma of the left breast, ER (pos)/AK/HER2 negative, [id number], Stage I. She is s/p breast conserving surgery and chemotherapy. The patient was treated to the left breast using a 3D/conformal technique.  The patient tolerated treatments quite well. The patient had the expected side effects of skin erythema and fatigue.  The patient received 5240 cGy to the left breast from 12/27/2021 through 1/25/2022  I last saw her in Jan. 2022 .    In the interim, the patient reports doing well.  Her fatigue is improving She continues to be active in her Jew.  Even though, she continues with general arthritis aches and pains, which is not worsening since taking Anastrozole.  She denies breast pain and has no new concerns.  \par  \par Upcoming appointments: \par Dr. Bell 5/3/2022\par Dr. Aj 5/25/2022\par Mammo & US 4/9/2022

## 2022-03-09 NOTE — REVIEW OF SYSTEMS
[Fatigue] : fatigue [SOB on Exertion] : shortness of breath during exertion [Joint Pain] : joint pain [Muscle Pain] : muscle pain [Negative] : Psychiatric [Dysphagia] : no dysphagia [Chest Pain] : no chest pain [Palpitations] : no palpitations [Shortness Of Breath] : no shortness of breath [Wheezing] : no wheezing [Cough] : no cough [Muscle Weakness] : no muscle weakness [Hot Flashes] : no hot flashes [FreeTextEntry2] : improving

## 2022-04-08 NOTE — DISCUSSION/SUMMARY
[Cancer Type / Location / Histology Subtype: ________] : Cancer Type / Location / Histology Subtype: [unfilled] [Diagnosis Date (year): ____] : Diagnosis Date (year): [unfilled] [I] : I [Surgery] : Surgery: Yes [Surgery Date(s) (year): ____] : Surgery Date(s) (year): [unfilled] [Surgical Procedure / Location / Findings: _________] : Surgical Procedure / Location / Findings: [unfilled] [Radiation] : Radiation: Yes [Body Area Treated: _________] : Body Area Treated: [unfilled] [End Date (year): ____] : End Date (year): [unfilled] [Systemic Therapy (chemotherapy, hormonal therapy, other)] : Systemic Therapy (chemotherapy, hormonal therapy, other): Yes [Need for onging (adjuvant) treatment for cancer?] : Need for onging (adjuvant) treatment for cancer? Yes [Follow up with Oncologist in _____] : Follow up with Oncologist in [unfilled] [Follow up with Surgeon in _____] : Follow up with Surgeon in [unfilled] [Follow up with Radiation MD in _____] : Follow up with Radiation MD in [unfilled] [Primary care physician] : primary care physician [Colonoscopy] : colonoscopy [Mammogram] : Mammogram [Bone Density Test] : bone density test [Emotional and mental health] : Emotional and mental health [Bridge to Survivorship Breast Cancer] : Bridge to Survivorship Breast Cancer [FreeTextEntry1] : Taxotere & Cytoxan - completed in Nov 2021. [FreeTextEntry2] : Anastrozole. [FreeTextEntry8] : Natali Baxter

## 2022-04-13 NOTE — ED PROVIDER NOTE - NS ED MD EM SELECTION
[id number] Comprehensive Dapsone Pregnancy And Lactation Text: This medication is Pregnancy Category C and is not considered safe during pregnancy or breast feeding.

## 2022-04-20 ENCOUNTER — ASOB RESULT (OUTPATIENT)
Age: 72
End: 2022-04-20

## 2022-04-20 ENCOUNTER — APPOINTMENT (OUTPATIENT)
Dept: OBGYN | Facility: CLINIC | Age: 72
End: 2022-04-20
Payer: MEDICARE

## 2022-04-20 VITALS
HEIGHT: 59 IN | BODY MASS INDEX: 43.14 KG/M2 | TEMPERATURE: 97.3 F | SYSTOLIC BLOOD PRESSURE: 140 MMHG | WEIGHT: 214 LBS | DIASTOLIC BLOOD PRESSURE: 90 MMHG | HEART RATE: 76 BPM

## 2022-04-20 DIAGNOSIS — N85.2 HYPERTROPHY OF UTERUS: ICD-10-CM

## 2022-04-20 DIAGNOSIS — Z86.39 PERSONAL HISTORY OF OTHER ENDOCRINE, NUTRITIONAL AND METABOLIC DISEASE: ICD-10-CM

## 2022-04-20 DIAGNOSIS — Z01.419 ENCOUNTER FOR GYNECOLOGICAL EXAMINATION (GENERAL) (ROUTINE) W/OUT ABNORMAL FINDINGS: ICD-10-CM

## 2022-04-20 PROCEDURE — G0101: CPT

## 2022-04-20 PROCEDURE — 76830 TRANSVAGINAL US NON-OB: CPT

## 2022-04-20 NOTE — HISTORY OF PRESENT ILLNESS
[Patient reported mammogram was normal] : Patient reported mammogram was normal [Patient reported PAP Smear was normal] : Patient reported PAP Smear was normal [Patient reported bone density results were normal] : Patient reported bone density results were normal [Patient reported colonoscopy was normal] : Patient reported colonoscopy was normal [LMP unknown] : LMP unknown [postmenopausal] : postmenopausal [N] : Patient is not sexually active [Y] : Positive pregnancy history [unknown] : Patient is unsure of the date of her LMP [Menarche Age: ____] : age at menarche was [unfilled] [Currently In Menopause] : currently in menopause [Menopause Age: ____] : age at menopause was [unfilled] [No] : Patient does not have concerns regarding sex [Previously active] : previously active [TextBox_4] : [facility]\par No history of fibroids, cysts, or STDs\par  [Mammogramdate] : 2022 [TextBox_19] : Hx breast Ca 2021 [PapSmeardate] : 2019 [BoneDensityDate] : 2022 [ColonoscopyDate] : 2019 [TextBox_43] : nl [PGHxTotal] : 2 [[facility]] : 2 [FreeTextEntry1] :  [[facility]] : 2

## 2022-04-20 NOTE — PROCEDURE
[Transvaginal Ultrasound] : transvaginal ultrasound [FreeTextEntry9] : enlarged uterus r/o fibroids , nonplapable adnexa [FreeTextEntry4] : nl uterus, neg ovaries

## 2022-04-20 NOTE — DISCUSSION/SUMMARY
[FreeTextEntry1] : 70 yo p2 for annual exam , h/o Breast cancer, obesity, bulky uterus, \par mammo/sono reports\par never did BRCA, suggested to consult w breast sx re testing \par weight loss /ozempic d/w patent /weight loss surgery \par pelvic sono - wnl

## 2022-04-20 NOTE — PHYSICAL EXAM
[Appropriately responsive] : appropriately responsive [Alert] : alert [No Acute Distress] : no acute distress [No Lymphadenopathy] : no lymphadenopathy [Soft] : soft [Non-tender] : non-tender [Non-distended] : non-distended [No HSM] : No HSM [No Lesions] : no lesions [No Mass] : no mass [Oriented x3] : oriented x3 [Examination Of The Breasts] : a normal appearance [No Discharge] : no discharge [No Masses] : no breast masses were palpable [Labia Majora] : normal [Labia Minora] : normal [No Bleeding] : There was no active vaginal bleeding [Normal] : normal [Normal Position] : in a normal position [Enlarged ___ wks] : enlarged [unfilled] ~Uweeks [Uterine Adnexae] : non-palpable [FreeTextEntry6] : difficult to palpate adnexa due to body habitus

## 2022-05-03 ENCOUNTER — APPOINTMENT (OUTPATIENT)
Dept: BREAST CENTER | Facility: CLINIC | Age: 72
End: 2022-05-03
Payer: MEDICARE

## 2022-05-03 VITALS
WEIGHT: 214 LBS | DIASTOLIC BLOOD PRESSURE: 88 MMHG | BODY MASS INDEX: 43.14 KG/M2 | TEMPERATURE: 97 F | HEIGHT: 59 IN | SYSTOLIC BLOOD PRESSURE: 140 MMHG

## 2022-05-03 DIAGNOSIS — Z91.89 OTHER SPECIFIED PERSONAL RISK FACTORS, NOT ELSEWHERE CLASSIFIED: ICD-10-CM

## 2022-05-03 PROCEDURE — 99212 OFFICE O/P EST SF 10 MIN: CPT

## 2022-05-03 PROCEDURE — 93702 BIS XTRACELL FLUID ANALYSIS: CPT

## 2022-05-03 NOTE — DATA REVIEWED
[FreeTextEntry1] : \par  Apr 11, 2022\par MAMMO TOMOSYNTHESIS DIAGNOSTIC BILATERAL, US BREAST COMPLETE LEFT\par Clinical Breast Exam: Patient does report clinical breast exam in the last year.\par \par Clinical Indication: Patient having a short term follow up bilateral breast. Patient has a personal history of left breast cancer. Family history of mother with breast cancer.\par Compared to: 12/13/2021, 05/20/2021, 05/14/2021, 04/29/2021, 01/14/2020, 12/07/2018, and 11/29/2017\par MAMMOGRAM: \par \par Tomosynthesis and 2D imaging of the breast(s) were performed.  Current study was also evaluated with a computer aided detection (CAD) system.\par \par Breast Density: There are scattered areas of fibroglandular density.\par \par No significant masses, calcifications, or other findings are seen in either breast. Stable postlumpectomy changes are seen in the left breast.\par \par \par US BREAST COMPLETE LEFT\par Ultrasound evaluation was performed including examination of all four quadrants of the breast(s) and the retroareolar regions.\par Color flow, Gray scale and real-time ultrasound of the left breast was performed. \par No suspicious abnormalities were seen sonographically in the left breast.\par OVERALL IMPRESSION: \par \par There is no mammographic or sonographic evidence of malignancy. Patient is due for a left breast diagnostic mammogram in 6 months as per lumpectomy protocol.\par As further detailed above, a 6 month follow-up DIAGNOSTIC imaging exam of the left breast(s) is recommended. A letter will be sent to the patient to return for follow up.\par \par BI-RADS 2: BENIGN\par \par   \par

## 2022-05-03 NOTE — HISTORY OF PRESENT ILLNESS
[FreeTextEntry1] : Graham is a 70 F with a left breast IDC,  ER weakly positive, FL neg, Her 2 neg, [id number], stage 1 breast cancer, s/p L WLE and SLN Bx on 2021. \par \par 2021 -- L WLE and SLN Bx \par 1. L breast mass @3N10 \par -IDC, poorly differentiated, medullary features \par -no intraductal component\par -no LVI \par -ER weakly positive, FL neg, Her 2 neg\par -T = 1.1 cm \par -closest margins was 0.5mm from superior margin and 1 mm from anterior margin\par -additional margins were all negative for carcinoma (superior, inferior, anterior, medial, lateral, posterior) \par \par 2. L axillary sentinel lymph node biopsy \par -0 LN positive for mets \par -[id number]\par \par Graham presented today with her friend, Silvia who was present for the entirety of the consultation. \par \par She has no breast related complaints at this time.  She denies any breast pain, has not palpated any new palpable masses in either breast and denies any nipple discharge or retraction.  \par \par Her work up was as follows: \par 2021 -- b/l screening mammogram \par -R: -no suspicious mass, microcalcifications or architectural distortion\par -L: in lateral breast, asymmetry present \par BIRADS 0 \par \par 2021 -- L dx mammogram and US \par -scattered areas of fibroglandular densities\par -L: @3:00, persistent mass \par L US \par -@3N10, hypoechoic mass, measures 8 x 7 x 13 mm --> BIOPSY \par BIRADS 4c \par \par 2021 -- L US CNBx @3N10 (coil clip) \par -IDC, SBR 3, poorly differentiated with associated lymphocytic reaction \par -ER weakly positive, FL neg (Toni 5/0), Her 2 neg \par \par HISTORICAL RISK FACTORS: \par -no prior breast biopsies, history of L breast lumpectomy for a cyst in the  \par -family history of breast cancer in her mother at age 52\par -, age at first live birth was 22 \par -prior OCP use x 15 years, stopped >30 years prior \par -no gyn surgeries\par \par INTERVAL HISTORY: andria Esteban returns for her FIRST POST OP VISIT, s/p L WLE and SLN Bx on 2021. \par \par Her final pathology revealed a 1.1 cm IDC, poorly differentiated, with medullary features, ER weakly positive, FL neg, Her 2 neg, no LVI, no intraductal component, negative surgical margins and 6 benign left axillary sentinel lymph nodes for a [id number] breast cancer. \par \par She is healing well from her recent surgery.  She has some soreness at her surgical site, but denies any pain, redness, fevers or chills. \par \par 2022 --\par GRAHAM STUBBS is a 71 year old female patient who presents today in follow up for stage IA ([id number]) IDC of left breast, G3, ER/FL/Her-2 negative, s/p left breast lumpectomy and SLNB on 21.\par \par Dr. Elvis Aj - completed adjuvant chemo with TC x 4 cycles.\par She has been started on adjuvant endocrine therapy with Anastrozole 1 mg daily.\par She has also recently completed radiation therapy with Dr. Steff Owen (2021 - 2022).\par \par Her most recent imaging is as follows:\par 2021 - Left Uni Dx Mammo & Sono:\par -There are scattered areas of fibroglandular density\par -Postlumpectomy changes are seen in the outer left breast.\par -No suspicious abnormalities were seen sonographically in the left breast. Postsurgical changes are seen in the left breast at 3:00 location 10 cm from the nipple.\par Patient is due for a bilateral diagnostic mammogram in 2022\par BI-RADS 2: BENIGN \par \par INTERVAL HISTORY 5/3/22\gaston Esteban is here for her 3 months follow up visit\par She has no breast related complaints at this time.  She denies any breast pain, has not palpated any new palpable masses in either breast and denies any nipple discharge or retraction.\par \par Her imaging is as follows:\par 22 b/l dx mammo and US\par -scattered areas of fibroglandular density\par - Stable postlumpectomy changes are seen in the left breast.\par BI-RADS 2\par \par SOZO L-Dex Score: left arm 21.3\par Date: 5/3/22\par \par

## 2022-05-03 NOTE — ASSESSMENT
[FreeTextEntry1] : Eulalia is a 71 F with a left breast IDC,  ER weakly positive, ND neg, Her 2 neg, [id number], stage 1 breast cancer, s/p L WLE and SLN Bx on 6/29/2021. \par \par 6/29/2021 -- L WLE and SLN Bx \par 1. L breast mass @3N10 \par -IDC, poorly differentiated, medullary features \par -no intraductal component\par -no LVI \par -ER weakly positive, ND neg, Her 2 neg\par -T = 1.1 cm \par -closest margins was 0.5mm from superior margin and 1 mm from anterior margin\par -additional margins were all negative for carcinoma (superior, inferior, anterior, medial, lateral, posterior) \par \par 2. L axillary sentinel lymph node biopsy \par -0/6 LN positive for mets \par -[id number]\par \par On physical exam, there are no discrete masses in either breast or axilla. There is no nipple discharge or inversion bilaterally. There are no skin changes bilaterally. Post radiation treatment changes noted on inspection left breast \par \par \par \par Her imaging is as follows:\par 4/11/22 b/l dx mammo and US\par -scattered areas of fibroglandular density\par - Stable postlumpectomy changes are seen in the left breast.\par BI-RADS 2\par \par \par Dr. Elvis Aj - completed adjuvant chemo with TC x 4 cycles.\par She has been started on adjuvant endocrine therapy with Anastrozole 1 mg daily.\par She has also recently completed radiation therapy with Dr. Steff Owen (12/27/2021 - 01/25/2022).\par \par Her final pathology revealed a 1.1 cm IDC, poorly differentiated, with medullary features, ER weakly positive, ND neg, Her 2 neg, no LVI, no intraductal component, negative surgical margins and 6 benign left axillary sentinel lymph nodes for a [id number] breast cancer. \par \par \par She will be due for a B/L Dx Mammo in April 2022. This will be scheduled for her today.  She can follow up after for a CBE. \par \par \par \par AS REVIEW: \par Her most recent imaging was a b/l screening mammogram and subsequent L dx mammogram and US On 4/29/2021 and 5/14/2021 which revealed @3N10, in her left breast, her biopsy proven cancer, measuring 8 x 7 x 13 mm. \par \par We have discussed her new diagnosis of breast cancer.  She is currently a stage 1 breast cancer, based off AJCC 8th edition.  We discussed her surgical and other treatment options at this time. \par \par In regards to her left sided breast cancer, she is a great candidate for either breast conservation surgery or a mastectomy.  Her lesion is located @3N10   based off the US so she is a candidate for a nipple sparing mastectomy.  However, there is no difference in survival between a lumpectomy + radiation therapy and a mastectomy.  However the rate of local recurrence is slightly higher with the lumpectomy. The rate of recurrence with a mastectomy is not zero, however, and is likely closer to 4-9%.  \par \par Regardless of if she chooses a mastectomy or a lumpectomy, she would require an evaluation of her axillary lymph nodes via a sentinel lymph node biopsy.  This is done by injecting the perioareolar breast tissue with dye prior to the surgery and removing 1-5 lymph nodes that are the first to drain the breast.  \par \par At this time, she is leaning towards undergoing breast conservation therapy with a LUMPECTOMY.  Her left breast lesion is not palpable on clinical exam, so she will need a radiofrequency guided lumpectomy on the left breast.  In the interim, we will obtain a bilateral breast MRI to further evaluate the extent of her disease. \par \par We did briefly discuss the different radiation options.  If she got a mastectomy, she would likely only need radiation therapy if she had a lot of positive margins. If she undergoes a lumpectomy, she is a candidate for both partial breast irradiation and whole breast irradiation.  We briefly discussed the differences between these two modalities.  \par \par In regards to systemic therapy, we briefly discussed both chemotherapy and endocrine therapy. If the tumor is larger than 1 cm and her 2 negative, we would likely need to send an additional study on her tumor sample, called an oncotype DX to make the determination regarding if chemotherapy would help her.  At the completion of all these treatments, she should get endocrine therapy, at current time she would need an AI, for a total of at least 5 years, although her ER is only weakly positive so she may not derive as much benefit from the endocrine therapy.  \par \par Per ASBrS consensus guidelines, any patient with a diagnosis of breast cancer should be offered panel genetic testing as 10% of these patients were found to have a mutation. In addition, her mother also had breast cancer, diagnosed at age 52. THis was offered to her and she would like to proceed with panel genetic testing.  Her blood was drawn today.\par \par All of her questions were answered.  She knows to call with any further questions or concerns.\par \par PLAN\par -SOZO L-Dex Score: left arm 21.3\par    Date: 5/3/22\par -[facility] panel genetic testing -- blood drawn -- results negative \par -Continue f/u with Med Onc as scheduled.\par -Continue f/u with Rad Onc as scheduled.\par -LEFT Dx Mammo - October 2022.\par -f/up after

## 2022-05-03 NOTE — PHYSICAL EXAM
[Normocephalic] : normocephalic [Atraumatic] : atraumatic [EOMI] : extra ocular movement intact [Examined in the supine and seated position] : examined in the supine and seated position [Symmetrical] : symmetrical [No dominant masses] : no dominant masses in right breast  [No dominant masses] : no dominant masses left breast [No Nipple Retraction] : no left nipple retraction [No Nipple Discharge] : no left nipple discharge [No Axillary Lymphadenopathy] : no left axillary lymphadenopathy [No Edema] : no edema [No Rashes] : no rashes [No Ulceration] : no ulceration [de-identified] : On physical exam, there are no discrete masses in either breast or axilla. There is no nipple discharge or inversion bilaterally. There are no skin changes bilaterally.\par \par  [de-identified] : SOZO L-Dex Score: left arm 21.3\par Date: 5/3/22

## 2022-05-25 ENCOUNTER — APPOINTMENT (OUTPATIENT)
Dept: HEMATOLOGY ONCOLOGY | Facility: CLINIC | Age: 72
End: 2022-05-25
Payer: MEDICARE

## 2022-05-25 ENCOUNTER — OUTPATIENT (OUTPATIENT)
Dept: OUTPATIENT SERVICES | Facility: HOSPITAL | Age: 72
LOS: 1 days | Discharge: HOME | End: 2022-05-25

## 2022-05-25 VITALS
DIASTOLIC BLOOD PRESSURE: 64 MMHG | WEIGHT: 220 LBS | TEMPERATURE: 97.3 F | HEIGHT: 59 IN | BODY MASS INDEX: 44.35 KG/M2 | HEART RATE: 73 BPM | SYSTOLIC BLOOD PRESSURE: 136 MMHG

## 2022-05-25 DIAGNOSIS — Z98.890 OTHER SPECIFIED POSTPROCEDURAL STATES: Chronic | ICD-10-CM

## 2022-05-25 DIAGNOSIS — Z96.652 PRESENCE OF LEFT ARTIFICIAL KNEE JOINT: Chronic | ICD-10-CM

## 2022-05-25 PROCEDURE — 99214 OFFICE O/P EST MOD 30 MIN: CPT

## 2022-05-25 NOTE — REVIEW OF SYSTEMS
[Negative] : Allergic/Immunologic [Fatigue] : fatigue [Joint Pain] : joint pain [Joint Stiffness] : joint stiffness [Muscle Pain] : muscle pain

## 2022-05-29 NOTE — PHYSICAL EXAM
[Fully active, able to carry on all pre-disease performance without restriction] : Status 0 - Fully active, able to carry on all pre-disease performance without restriction [Normal] : affect appropriate [de-identified] : Status post left breast lumpectomy and SLNB. Surgical incisions are healing well. There is no palpable abnormality.

## 2022-05-29 NOTE — ASSESSMENT
[FreeTextEntry1] : 70 yo female has stage IA ([id number]) IDC of left breast, G3, ER/GA/Her-2 negative, s/p left breast lumpectomy and SLNB with negative margins.\par \par Germline genetic panel study ([facility]) is negative for mutations.\par \par Assessment and Plan:\par -- Completed adjuvant chemo with TC x 4 cycles in 11/2021.\par -- S/P adjuvant breast radiotherapy 5240cGy between 12/27/21 to 1/25/22. \par -- Breast exam today shows no palpable abnormality. \par -- Last mammo and US b/l breast April 2022 was BIRADS 2. Her next Lt breast mammo will be in Oct 2022. \par -- Given c/o diffuse muscle aches and joint pains, she was advised to stop anastrozole and take a break for 2 weeks. She is recommend switching to exemestane after 2 weeks break. A script was sent to her pharmacy. \par -- Dexa scan Dec 2021 was normal. She will c/w Ca and Vit D. Encourage health life style and regular physical activities.\par -- Follow up with Dr. Bell and Dr. Owen as directed.  \par -- RTO for followup in 3 months. She will call if there is any new concern. \par \par Case was seen and discussed with Dr. Aj  who agreed with the assessment and plan.\par \par

## 2022-05-29 NOTE — HISTORY OF PRESENT ILLNESS
[de-identified] : 69 yo female was referred by Dr. Bell for consultation of breast cancer. Eulalia had b/l screening mammo on 21 which showed asymmetry in the lateral left breast. On 21, left breast dx mammo and US showed hypoechoic mass, measures 8 x 7 x 13 mm  @3N10. 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 \par \par On 21, she had b/l breast MRI which showed Abnormal enhancement in the region of the biopsy corresponding to the site of index cancer. No other areas of abnormal enhancement in either breast\par \par On 21, she underwent left 3 N10 mass, radio frequency seed localized lumpectomy. 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 No lymphovascular invasion nor intraductal component is present. The surgical margins are negative. 6 sentinel lymph nodes are negative for malignancy. AJCC 8th Edition Pathologic Stage: pT1c, p(sn)N0, pMx. Biomarker study were done on surgical specimen. ER, WI and Her-2 (FISH ratio 1.3) are all negative. Ki 67 is 90%. \par \par Eulalia recovered well from surgery. She is here today to discuss adjuvant systemic therapy. \par \par She has a family history of breast cancer in her mother mother diagnosed with breast cancer at 40's. Her brother had esophageal cancer passed away at 45. She had germ line genetic test. The result is pending. \par \par She is  and was menopause at 49. \par left knee replacement. chronic pain joints. [de-identified] : \par 9/9/21andria Esteban is here today for follow up visit and to start chemotherapy. She has stage IA ([id number]) IDC of left breast, G3, ER/NV/Her-2 negative, s/p left breast lumpectomy and SLNB on 6/29/21 with negative margins. During her previous visit, we discussed adjuvant chemotherapy options. I also talked to her daughter who decided to let her mother take TC regimen x 4 cycles.  She had an unofficial second opinion from her daughter's contact at a [facility]. She had her port placed on 9/3/21 without any complications.  Today, she offers no new breast-related complaints.\par \par 9/30/21andria Esteban is here today for follow up visit and chemotherapy. She has stage IA ([id number]) IDC of left breast, G3, ER/NV/Her-2 negative, s/p left breast lumpectomy and SLNB on 6/29/21 with negative margins. She started adjuvant chemo with TC and had the 1st cycle three weeks. She had nausea for several days following chemo.  She used the compazine and zofran which caused some headaches. She did not have stomatitis, diarrhea or fever. \par \par 10/20/21andria Esteban is here today for follow up visit and chemotherapy. She has stage IA ([id number]) IDC of left breast, G3, ER/NV/Her-2 negative, s/p left breast lumpectomy and SLNB on 6/29/21 with negative margins. She started adjuvant chemo with TC and to date she received 2 cycles. She states 2 days after the treatment she developed pruritic and a pruritic rash on her head subsided with Benadryl and PO and Benadryl cream. She had nausea and fatigue for several days following chemo. She used the zofran which relieves nausea. She also reports numbness and tingling to left fingers started after 2nd treatment. She did not have stomatitis, diarrhea or fever. \par \par 11/11/21andria Esteban is here today for follow up visit and chemotherapy. She has stage IA ([id number]) IDC of left breast, G3, ER/NV/Her-2 negative, s/p left breast lumpectomy and SLNB on 6/29/21 with negative margins. She started adjuvant chemo with TC and to date she received 3 cycles. She states 2 days after the treatment she develops a pruritic erythematous rash on her abdomen and under bilateral breast. The rash subsides with Benadryl and PO and Benadryl cream. She had nausea and fatigue for several days following chemo. She used the zofran which relieves nausea. She also reports numbness and tingling to left fingers. When she went for chemo 10/20 the nurses had difficulty accessing port. She was sent to IR for port evaluation and revision. Port was resutured and working. On 11/2 she was seen by IR for follow up however port was noted to have drainage and pus, patient also had fever. Port was subsequently removed and she was started on Cephalexin 500 mg Q12 x 7 days (last dose tomorrow). A Midline line was inserted to right upper arm. She saw PMD last week for a non productive, chest xray was done and she was dx with bronchitis, she completed Zpak and continues prednisone. Also noted to have fluid in lungs, therefore PMD increased her water pill. She denies cough, fever, chills, SOB. \par \par 12/2/21andria Stevensonie is here today for follow up visit and chemotherapy. She has stage IA ([id number]) IDC of left breast, G3, ER/NV/Her-2 negative, s/p left breast lumpectomy and SLNB on 6/29/21 with negative margins. She started adjuvant chemo with TC and to date she received 4 cycles. Rash and itch has subsided since chemotherapy was completed. She still reports numbness and tingling to left fingers. When she went for chemo 10/20 the nurses had difficulty accessing port. She was sent to IR for port evaluation and revision. Port was resutured and working. On 11/2 she was seen by IR for follow up however port was noted to have drainage and pus, patient also had fever. Port was subsequently removed and a Midline line was inserted to right upper arm, which was removed after her last treatment. She denies cough, fever, chills, SOB. She saw Dr Owen today, plan to start RT in 1-2 weeks. \par \par 2/10/22andria Esteban is here today for follow up visit and chemotherapy. She has stage IA ([id number]) IDC of left breast, G3, ER/NV/Her-2 negative, s/p left breast lumpectomy and SLNB on 6/29/21 with negative margins. She completed adjuvant chemo with TC 11/2021 and radiation #20 on 1/31/20222 with Dr. Owen.  She stated on Anastrozole in 12/2021 and tolerating it well. She saw Dr. Bell recently and c/o neuropathy to her left hand/arm.  She was instructed to get medication from Dr. Aj.  \par \par \par 5/25/22- Eulalia is here today for follow up visit today.  She has stage IA ([id number]) IDC of left breast, G3, ER/NV/Her-2 negative, s/p left breast lumpectomy and SLNB on 6/29/21 with negative margins. She completed adjuvant chemo with TC 11/2021 and radiation #20 on 1/31/20222 with Dr. Owen.  She stated on Anastrozole in 12/2021 and has c/.o diffuse muscle/ joint aches that is impacting her quality of life.  Her last mammogram and US breast b/l April 2022 was BIRADS 2. \par

## 2022-05-31 DIAGNOSIS — C50.512 MALIGNANT NEOPLASM OF LOWER-OUTER QUADRANT OF LEFT FEMALE BREAST: ICD-10-CM

## 2022-05-31 DIAGNOSIS — Z51.81 ENCOUNTER FOR THERAPEUTIC DRUG LEVEL MONITORING: ICD-10-CM

## 2022-06-09 NOTE — DISCHARGE NOTE NURSING/CASE MANAGEMENT/SOCIAL WORK - NSDCPEPT PROEDMA_GEN_ALL_CORE
Yes Detail Level: Generalized Quality 130: Documentation Of Current Medications In The Medical Record: Current Medications Documented

## 2022-09-01 ENCOUNTER — APPOINTMENT (OUTPATIENT)
Dept: HEMATOLOGY ONCOLOGY | Facility: CLINIC | Age: 72
End: 2022-09-01

## 2022-09-01 ENCOUNTER — OUTPATIENT (OUTPATIENT)
Dept: OUTPATIENT SERVICES | Facility: HOSPITAL | Age: 72
LOS: 1 days | Discharge: HOME | End: 2022-09-01

## 2022-09-01 VITALS
SYSTOLIC BLOOD PRESSURE: 148 MMHG | DIASTOLIC BLOOD PRESSURE: 65 MMHG | HEART RATE: 74 BPM | RESPIRATION RATE: 14 BRPM | HEIGHT: 59 IN | WEIGHT: 220 LBS | TEMPERATURE: 98 F | BODY MASS INDEX: 44.35 KG/M2

## 2022-09-01 DIAGNOSIS — Z98.890 OTHER SPECIFIED POSTPROCEDURAL STATES: Chronic | ICD-10-CM

## 2022-09-01 DIAGNOSIS — Z96.652 PRESENCE OF LEFT ARTIFICIAL KNEE JOINT: Chronic | ICD-10-CM

## 2022-09-01 PROCEDURE — 99214 OFFICE O/P EST MOD 30 MIN: CPT

## 2022-09-01 NOTE — HISTORY OF PRESENT ILLNESS
[de-identified] : 69 yo female was referred by Dr. Bell for consultation of breast cancer. Eulalia had b/l screening mammo on 21 which showed asymmetry in the lateral left breast. On 21, left breast dx mammo and US showed hypoechoic mass, measures 8 x 7 x 13 mm  @3N10. 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 \par \par On 21, she had b/l breast MRI which showed Abnormal enhancement in the region of the biopsy corresponding to the site of index cancer. No other areas of abnormal enhancement in either breast\par \par On 21, she underwent left 3 N10 mass, radio frequency seed localized lumpectomy. 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 No lymphovascular invasion nor intraductal component is present. The surgical margins are negative. 6 sentinel lymph nodes are negative for malignancy. AJCC 8th Edition Pathologic Stage: pT1c, p(sn)N0, pMx. Biomarker study were done on surgical specimen. ER, ID and Her-2 (FISH ratio 1.3) are all negative. Ki 67 is 90%. \par \par Eulalia recovered well from surgery. She is here today to discuss adjuvant systemic therapy. \par \par She has a family history of breast cancer in her mother mother diagnosed with breast cancer at 40's. Her brother had esophageal cancer passed away at 45. She had germ line genetic test. The result is pending. \par \par She is  and was menopause at 49. \par left knee replacement. chronic pain joints. [de-identified] : \par 9/9/21andria Esteban is here today for follow up visit and to start chemotherapy. She has stage IA ([id number]) IDC of left breast, G3, ER/WI/Her-2 negative, s/p left breast lumpectomy and SLNB on 6/29/21 with negative margins. During her previous visit, we discussed adjuvant chemotherapy options. I also talked to her daughter who decided to let her mother take TC regimen x 4 cycles.  She had an unofficial second opinion from her daughter's contact at a [facility]. She had her port placed on 9/3/21 without any complications.  Today, she offers no new breast-related complaints.\par \par 9/30/21andria Esteban is here today for follow up visit and chemotherapy. She has stage IA ([id number]) IDC of left breast, G3, ER/WI/Her-2 negative, s/p left breast lumpectomy and SLNB on 6/29/21 with negative margins. She started adjuvant chemo with TC and had the 1st cycle three weeks. She had nausea for several days following chemo.  She used the compazine and zofran which caused some headaches. She did not have stomatitis, diarrhea or fever. \par \par 10/20/21andria Esteban is here today for follow up visit and chemotherapy. She has stage IA ([id number]) IDC of left breast, G3, ER/WI/Her-2 negative, s/p left breast lumpectomy and SLNB on 6/29/21 with negative margins. She started adjuvant chemo with TC and to date she received 2 cycles. She states 2 days after the treatment she developed pruritic and a pruritic rash on her head subsided with Benadryl and PO and Benadryl cream. She had nausea and fatigue for several days following chemo. She used the zofran which relieves nausea. She also reports numbness and tingling to left fingers started after 2nd treatment. She did not have stomatitis, diarrhea or fever. \par \par 11/11/21andria Esteban is here today for follow up visit and chemotherapy. She has stage IA ([id number]) IDC of left breast, G3, ER/WI/Her-2 negative, s/p left breast lumpectomy and SLNB on 6/29/21 with negative margins. She started adjuvant chemo with TC and to date she received 3 cycles. She states 2 days after the treatment she develops a pruritic erythematous rash on her abdomen and under bilateral breast. The rash subsides with Benadryl and PO and Benadryl cream. She had nausea and fatigue for several days following chemo. She used the zofran which relieves nausea. She also reports numbness and tingling to left fingers. When she went for chemo 10/20 the nurses had difficulty accessing port. She was sent to IR for port evaluation and revision. Port was resutured and working. On 11/2 she was seen by IR for follow up however port was noted to have drainage and pus, patient also had fever. Port was subsequently removed and she was started on Cephalexin 500 mg Q12 x 7 days (last dose tomorrow). A Midline line was inserted to right upper arm. She saw PMD last week for a non productive, chest xray was done and she was dx with bronchitis, she completed Zpak and continues prednisone. Also noted to have fluid in lungs, therefore PMD increased her water pill. She denies cough, fever, chills, SOB. \par \par 12/2/21andria Stevensonie is here today for follow up visit and chemotherapy. She has stage IA ([id number]) IDC of left breast, G3, ER/WI/Her-2 negative, s/p left breast lumpectomy and SLNB on 6/29/21 with negative margins. She started adjuvant chemo with TC and to date she received 4 cycles. Rash and itch has subsided since chemotherapy was completed. She still reports numbness and tingling to left fingers. When she went for chemo 10/20 the nurses had difficulty accessing port. She was sent to IR for port evaluation and revision. Port was resutured and working. On 11/2 she was seen by IR for follow up however port was noted to have drainage and pus, patient also had fever. Port was subsequently removed and a Midline line was inserted to right upper arm, which was removed after her last treatment. She denies cough, fever, chills, SOB. She saw Dr Owen today, plan to start RT in 1-2 weeks. \par \par 2/10/22andria Esteban is here today for follow up visit and chemotherapy. She has stage IA ([id number]) IDC of left breast, G3, ER/WI/Her-2 negative, s/p left breast lumpectomy and SLNB on 6/29/21 with negative margins. She completed adjuvant chemo with TC 11/2021 and radiation #20 on 1/31/20222 with Dr. Owen.  She stated on Anastrozole in 12/2021 and tolerating it well. She saw Dr. Bell recently and c/o neuropathy to her left hand/arm.  She was instructed to get medication from Dr. Aj.  \par \par \par 5/25/22Rajan Esteban is here today for follow up visit today.  She has stage IA ([id number]) IDC of left breast, G3, ER/WI/Her-2 negative, s/p left breast lumpectomy and SLNB on 6/29/21 with negative margins. She completed adjuvant chemo with TC 11/2021 and radiation #20 on 1/31/20222 with Dr. Owen.  She stated on Anastrozole in 12/2021 and has c/.o diffuse muscle/ joint aches that is impacting her quality of life.  Her last mammogram and US breast b/l April 2022 was BIRADS 2. \par \par 09/1/22andria Esteban is here today for follow up visit today. She has stage IA ([id number]) IDC of left breast, G3, ER/WI/Her-2 negative, s/p left breast lumpectomy and SLNB on 6/29/21 with negative margins. She completed adjuvant chemo with TC 11/2021 and radiation on 1/31/20222 with Dr. Owen.  She stated on Anastrozole in 12/2021 and has c/.o diffuse muscle/ joint aches that is impacting her quality of life, anastrozole was held for two week, arthralgia persisted. She started exemestane in 6/2022, tolerating well. Her last mammogram and US breast b/l April 2022 was BIRADS 2. She still reports peripheral neuropathy left hand, was taking gabapentin with relief, ran out of medication. \par

## 2022-09-01 NOTE — ASSESSMENT
[FreeTextEntry1] : 72 yo female has stage IA ([id number]) IDC of left breast, G3, ER positive on biopsy but negative on surgical specimen, ID/Her-2 negative, s/p left breast lumpectomy and SLNB with negative margins.\par \par Germline genetic panel study ([facility]) is negative for mutations.\par \par Assessment and Plan:\par -- Completed adjuvant chemo with TC x 4 cycles in 11/2021.\par -- S/P adjuvant breast radiotherapy 5240cGy between 12/27/21 to 1/25/22. \par -- Breast exam today shows no palpable abnormality. \par -- Last mammo and US b/l breast April 2022 was BIRADS 2. Her next Lt breast mammo will be in Oct 2022. \par -- Continue exemestane daily.\par -- Dexa scan Dec 2021 was normal. She will c/w Ca and Vit D. Encourage health life style and regular physical activities.\par -- Peripheral neuropathy continue gabapentin as needed. \par -- Follow up with Dr. Bell and Dr. Owen as directed.  \par -- RTO for followup in 6 months. She will call if there is any new concern. \par \par Case was seen and discussed with Dr. Aj  who agreed with the assessment and plan.\par \par

## 2022-09-01 NOTE — PHYSICAL EXAM
[Fully active, able to carry on all pre-disease performance without restriction] : Status 0 - Fully active, able to carry on all pre-disease performance without restriction [Normal] : affect appropriate [de-identified] : Status post left breast lumpectomy and SLNB. Surgical incisions are healing well. There is no palpable abnormality.

## 2022-09-01 NOTE — REVIEW OF SYSTEMS
[Fatigue] : fatigue [Joint Pain] : joint pain [Joint Stiffness] : joint stiffness [Muscle Pain] : muscle pain [Negative] : Allergic/Immunologic

## 2022-09-06 DIAGNOSIS — C50.412 MALIGNANT NEOPLASM OF UPPER-OUTER QUADRANT OF LEFT FEMALE BREAST: ICD-10-CM

## 2022-09-06 DIAGNOSIS — Z51.81 ENCOUNTER FOR THERAPEUTIC DRUG LEVEL MONITORING: ICD-10-CM

## 2022-09-06 DIAGNOSIS — G62.9 POLYNEUROPATHY, UNSPECIFIED: ICD-10-CM

## 2022-09-27 ENCOUNTER — OUTPATIENT (OUTPATIENT)
Dept: OUTPATIENT SERVICES | Facility: HOSPITAL | Age: 72
LOS: 1 days | Discharge: HOME | End: 2022-09-27

## 2022-09-27 ENCOUNTER — APPOINTMENT (OUTPATIENT)
Dept: RADIATION ONCOLOGY | Facility: HOSPITAL | Age: 72
End: 2022-09-27

## 2022-09-27 VITALS
TEMPERATURE: 97.5 F | WEIGHT: 221.5 LBS | RESPIRATION RATE: 14 BRPM | HEART RATE: 95 BPM | DIASTOLIC BLOOD PRESSURE: 50 MMHG | BODY MASS INDEX: 44.74 KG/M2 | OXYGEN SATURATION: 96 % | SYSTOLIC BLOOD PRESSURE: 129 MMHG

## 2022-09-27 DIAGNOSIS — C50.412 MALIGNANT NEOPLASM OF UPPER-OUTER QUADRANT OF LEFT FEMALE BREAST: ICD-10-CM

## 2022-09-27 DIAGNOSIS — Z98.890 OTHER SPECIFIED POSTPROCEDURAL STATES: Chronic | ICD-10-CM

## 2022-09-27 DIAGNOSIS — Z96.652 PRESENCE OF LEFT ARTIFICIAL KNEE JOINT: Chronic | ICD-10-CM

## 2022-09-27 PROCEDURE — 99213 OFFICE O/P EST LOW 20 MIN: CPT

## 2022-09-27 RX ORDER — ANASTROZOLE TABLETS 1 MG/1
1 TABLET ORAL
Qty: 90 | Refills: 2 | Status: DISCONTINUED | COMMUNITY
Start: 2021-12-02 | End: 2022-09-27

## 2022-09-27 RX ORDER — PROCHLORPERAZINE MALEATE 10 MG/1
10 TABLET ORAL EVERY 6 HOURS
Qty: 30 | Refills: 3 | Status: DISCONTINUED | COMMUNITY
Start: 2021-09-07 | End: 2022-09-27

## 2022-09-27 RX ORDER — ONDANSETRON 8 MG/1
8 TABLET ORAL EVERY 8 HOURS
Qty: 20 | Refills: 3 | Status: DISCONTINUED | COMMUNITY
Start: 2021-09-07 | End: 2022-09-27

## 2022-09-27 RX ORDER — CALCIUM CARBONATE/VITAMIN D3 600 MG-20
TABLET ORAL
Refills: 0 | Status: ACTIVE | COMMUNITY

## 2022-09-27 RX ORDER — GABAPENTIN 100 MG
100 TABLET ORAL AT BEDTIME
Refills: 0 | Status: DISCONTINUED | COMMUNITY
End: 2022-09-27

## 2022-09-27 NOTE — DISEASE MANAGEMENT
[Pathological] : TNM Stage: p [IB] : IB [FreeTextEntry4] : invasive ductal carcinoma of the left breast, ER (pos)/ME/HER2 negative, upper outer quadrant  [TTNM] : 1c [NTNM] : 0 [MTNM] : 0 [de-identified] : left breast

## 2022-09-27 NOTE — REVIEW OF SYSTEMS
[SOB on Exertion] : shortness of breath during exertion [Joint Pain] : joint pain [Muscle Pain] : muscle pain [Negative] : Psychiatric [Chest Pain] : no chest pain [Palpitations] : no palpitations [Shortness Of Breath] : no shortness of breath [Wheezing] : no wheezing [Cough] : no cough

## 2022-09-27 NOTE — PHYSICAL EXAM
[Sclera] : the sclera and conjunctiva were normal [Hearing Threshold Finger Rub Not [place]] : hearing was normal [Heart Rate And Rhythm] : heart rate and rhythm were normal [Heart Sounds] : normal S1 and S2 [Murmurs] : no murmurs present [Rt > Lt] : the right breast was larger than the left [No Discharge] : no discharge [No Masses] : no breast masses were palpable [Abdomen Soft] : soft [Nondistended] : nondistended [Abdomen Tenderness] : non-tender [Cervical Lymph Nodes Enlarged Posterior Bilaterally] : posterior cervical [Cervical Lymph Nodes Enlarged Anterior Bilaterally] : anterior cervical [Supraclavicular Lymph Nodes Enlarged Bilaterally] : supraclavicular [Nail Clubbing] : no clubbing  or cyanosis of the fingernails [Motor Tone] : muscle strength and tone were normal [Skin Color & Pigmentation] : normal skin color and pigmentation [No Focal Deficits] : no focal deficits [Motor Exam] : the motor exam was normal [Enlargement Of The Right Breast] : no swelling [Tenderness Of The Right Breast] : no tenderness [Breast Reconstruction Right] : no breast reconstruction [___] :  no erythema [Enlargement Of The Left Breast] : no swelling [Tenderness Of The Left Breast] : no tenderness [Breast Reconstruction Left] : no breast reconstruction [Axillary Lymph Nodes Enlarged Bilaterally] : no enlarged nodes [Normal] : no palpable adenopathy [de-identified] : residual hyperpigmentation IMF

## 2022-09-27 NOTE — HISTORY OF PRESENT ILLNESS
[FreeTextEntry1] : GRAHAM STUBBS returns to clinic in follow up visit.  As you know, GRAHAM STUBBS is a 71 year old female with invasive ductal carcinoma of the left breast, ER (pos)/NM/HER2 negative, [id number], Stage I. She is s/p breast conserving surgery and chemotherapy. The patient was treated to the left breast using a 3D/conformal technique.  The patient tolerated treatments quite well. The patient had the expected side effects of skin erythema and fatigue.  The patient received 5240 cGy to the left breast from 12/27/2021 through 1/25/2022  I last saw her in March, 2022 .    In the interim, the patient was switched to exemestane on 6/2022 (c/o muscle/joint pain), arthritis not as severe since switching, managed by Dr. Aj.  Dx b/l mammo on 4/11/2022 shows no evidence of malignancy. Otherwise, she continues with her ADLs, has no new concerns.  \par \par Upcoming appointments: \par Vee 10/26/2022\par Dr. Aj 3/8/2023\par Mammo & US upcoming 10/2022

## 2022-09-27 NOTE — LETTER CLOSING
[Consult Closing:] : Thank you for allowing me to participate in the care of this patient.  If you have any questions, please do not hesitate to contact me. [Sincerely yours,] : Sincerely yours, [FreeTextEntry3] : Steff Owen M.D. \par \par Electronically proofread and signed by:  Steff Owen MD\par Attending, Department of Radiation Medicine\par [facility]\par \par CC: Dr. Bell

## 2022-10-19 ENCOUNTER — NON-APPOINTMENT (OUTPATIENT)
Age: 72
End: 2022-10-19

## 2022-10-26 ENCOUNTER — APPOINTMENT (OUTPATIENT)
Dept: BREAST CENTER | Facility: CLINIC | Age: 72
End: 2022-10-26

## 2022-10-26 VITALS
HEIGHT: 60 IN | WEIGHT: 220 LBS | BODY MASS INDEX: 43.19 KG/M2 | DIASTOLIC BLOOD PRESSURE: 90 MMHG | SYSTOLIC BLOOD PRESSURE: 152 MMHG

## 2022-10-26 PROCEDURE — 99213 OFFICE O/P EST LOW 20 MIN: CPT

## 2022-10-26 NOTE — ASSESSMENT
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 \par \par 6/29/2021 -- L WLE and SLN Bx \par 1. L breast mass @3N10 \par -IDC, poorly differentiated, medullary features \par -no intraductal component\par -no LVI \par -ER weakly positive, HI neg, Her 2 neg\par -T = 1.1 cm \par -closest margins was 0.5mm from superior margin and 1 mm from anterior margin\par -additional margins were all negative for carcinoma (superior, inferior, anterior, medial, lateral, posterior) \par \par 2. L axillary sentinel lymph node biopsy \par -0/6 LN positive for mets \par -[id number]\par \par On physical exam, no suspicious abnormalities palpated within either breast, no lymphadenopathy. left breast - both surgical incisions are healing well; palpable area of scar tissue noted - approx 2x2cm. \par \par Dr. Elvis Aj - completed adjuvant chemo with TC x 4 cycles.\par She had been started on adjuvant endocrine therapy with Anastrozole 1 mg daily - this was changed to Exemestane as of June 2022.\par She has also completed radiation therapy with Dr. Steff Owen (12/27/2021 - 01/25/2022).\par \par Her most recent imaging is as follows:\par Left Uni Dx Mammo - 10/17/2022:\par -There are scattered areas of fibroglandular density.\par -Stable postlumpectomy changes are seen in the left breast.\par -There is no mammographic evidence of malignancy.  \par BI-RADS 2: BENIGN\par \par \par Her final pathology revealed a 1.1 cm IDC, poorly differentiated, with medullary features, ER weakly positive, HI neg, Her 2 neg, no LVI, no intraductal component, negative surgical margins and 6 benign left axillary sentinel lymph nodes for a [id number] breast cancer. \par \par \par She will be due for a B/L Dx Mammo in April 2023. This will be scheduled for her today.  She can follow up after for a CBE. \par \par ---\par AS REVIEW: \par Her most recent imaging was a b/l screening mammogram and subsequent L dx mammogram and US On 4/29/2021 and 5/14/2021 which revealed @3N10, in her left breast, her biopsy proven cancer, measuring 8 x 7 x 13 mm. \par \par We have discussed her new diagnosis of breast cancer.  She is currently a stage 1 breast cancer, based off AJCC 8th edition.  We discussed her surgical and other treatment options at this time. \par \par In regards to her left sided breast cancer, she is a great candidate for either breast conservation surgery or a mastectomy.  Her lesion is located @3N10   based off the US so she is a candidate for a nipple sparing mastectomy.  However, there is no difference in survival between a lumpectomy + radiation therapy and a mastectomy.  However the rate of local recurrence is slightly higher with the lumpectomy. The rate of recurrence with a mastectomy is not zero, however, and is likely closer to 4-9%.  \par \par Regardless of if she chooses a mastectomy or a lumpectomy, she would require an evaluation of her axillary lymph nodes via a sentinel lymph node biopsy.  This is done by injecting the perioareolar breast tissue with dye prior to the surgery and removing 1-5 lymph nodes that are the first to drain the breast.  \par \par At this time, she is leaning towards undergoing breast conservation therapy with a LUMPECTOMY.  Her left breast lesion is not palpable on clinical exam, so she will need a radiofrequency guided lumpectomy on the left breast.  In the interim, we will obtain a bilateral breast MRI to further evaluate the extent of her disease. \par \par We did briefly discuss the different radiation options.  If she got a mastectomy, she would likely only need radiation therapy if she had a lot of positive margins. If she undergoes a lumpectomy, she is a candidate for both partial breast irradiation and whole breast irradiation.  We briefly discussed the differences between these two modalities.  \par \par In regards to systemic therapy, we briefly discussed both chemotherapy and endocrine therapy. If the tumor is larger than 1 cm and her 2 negative, we would likely need to send an additional study on her tumor sample, called an oncotype DX to make the determination regarding if chemotherapy would help her.  At the completion of all these treatments, she should get endocrine therapy, at current time she would need an AI, for a total of at least 5 years, although her ER is only weakly positive so she may not derive as much benefit from the endocrine therapy.  \par \par Per ASBrS consensus guidelines, any patient with a diagnosis of breast cancer should be offered panel genetic testing as 10% of these patients were found to have a mutation. In addition, her mother also had breast cancer, diagnosed at age 52. THis was offered to her and she would like to proceed with panel genetic testing.  Her blood was drawn today.\par \par I spent a total of 20 minutes of face to face time with this patient, greater than 50% of which was spent in counseling and/or coordination of care.\par All of her questions were appropriately answered.\par She knows to call with any concerns.\par \par \par PLAN\par -SOZO: 23.5 --> 12.9 --> 14.5 this is a change from her baseline - she has been referred to lymphedema specialist for prevention measures - uses compression sleeve/gauntlet.\par -INVITAE panel genetic testing -- blood drawn -- results negative \par -Continue f/u with Med Onc as scheduled.\par -Continue f/u with Rad Onc as scheduled.\par -B/L Dx Mammo - April 2023.\par -f/up after

## 2022-10-26 NOTE — PHYSICAL EXAM
[Normocephalic] : normocephalic [Atraumatic] : atraumatic [No Supraclavicular Adenopathy] : no supraclavicular adenopathy [No dominant masses] : no dominant masses in right breast  [No dominant masses] : no dominant masses left breast [No Nipple Discharge] : no left nipple discharge [No Rashes] : no rashes [No Ulceration] : no ulceration [Breast Nipple Inversion] : nipples not inverted [Breast Nipple Retraction] : nipples not retracted [de-identified] : no suspicious abnormalities palpated within either breast, no lymphadenopathy  [de-identified] : both surgical incisions are well healed; palpable area of scar tissue noted - approx 2x2cm.  [de-identified] : No axillary lymphadenopathy appreciated. [de-identified] : No axillary lymphadenopathy appreciated. [de-identified] : SOZO: 23.5 --> 12.9 --> 14.5 [de-identified] : some hyperpigmentation of the left breast secondary to RT noted.

## 2022-10-26 NOTE — HISTORY OF PRESENT ILLNESS
[FreeTextEntry1] : Graham is a 70 F with a left breast IDC,  ER weakly positive, UT neg, Her 2 neg, [id number], stage 1 breast cancer, s/p L WLE and SLN Bx on 2021. \par \par 2021 -- L WLE and SLN Bx \par 1. L breast mass @3N10 \par -IDC, poorly differentiated, medullary features \par -no intraductal component\par -no LVI \par -ER weakly positive, UT neg, Her 2 neg\par -T = 1.1 cm \par -closest margins was 0.5mm from superior margin and 1 mm from anterior margin\par -additional margins were all negative for carcinoma (superior, inferior, anterior, medial, lateral, posterior) \par \par 2. L axillary sentinel lymph node biopsy \par -0 LN positive for mets \par -[id number]\par \par Graham presented today with her friend, Silvia who was present for the entirety of the consultation. \par \par She has no breast related complaints at this time.  She denies any breast pain, has not palpated any new palpable masses in either breast and denies any nipple discharge or retraction.  \par \par Her work up was as follows: \par 2021 -- b/l screening mammogram \par -R: -no suspicious mass, microcalcifications or architectural distortion\par -L: in lateral breast, asymmetry present \par BIRADS 0 \par \par 2021 -- L dx mammogram and US \par -scattered areas of fibroglandular densities\par -L: @3:00, persistent mass \par L US \par -@3N10, hypoechoic mass, measures 8 x 7 x 13 mm --> BIOPSY \par BIRADS 4c \par \par 2021 -- L US CNBx @3N10 (coil clip) \par -IDC, SBR 3, poorly differentiated with associated lymphocytic reaction \par -ER weakly positive, UT neg (Toni 5/0), Her 2 neg \par \par HISTORICAL RISK FACTORS: \par -no prior breast biopsies, history of L breast lumpectomy for a cyst in the  \par -family history of breast cancer in her mother at age 52\par -, age at first live birth was 22 \par -prior OCP use x 15 years, stopped >30 years prior \par -no gyn surgeries\par \par INTERVAL HISTORY: andria Esteban returns for her FIRST POST OP VISIT, s/p L WLE and SLN Bx on 2021. \par \par Her final pathology revealed a 1.1 cm IDC, poorly differentiated, with medullary features, ER weakly positive, UT neg, Her 2 neg, no LVI, no intraductal component, negative surgical margins and 6 benign left axillary sentinel lymph nodes for a [id number] breast cancer. \par \par She is healing well from her recent surgery.  She has some soreness at her surgical site, but denies any pain, redness, fevers or chills. \par \par 2022 --\par GRAHAM STUBBS is a 71 year old female patient who presents today in follow up for stage IA ([id number]) IDC of left breast, G3, ER/UT/Her-2 negative, s/p left breast lumpectomy and SLNB on 21.\par \par Dr. Elvis Aj - completed adjuvant chemo with TC x 4 cycles.\par She has been started on adjuvant endocrine therapy with Anastrozole 1 mg daily.\par She has also recently completed radiation therapy with Dr. Steff Owen (2021 - 2022).\par \par Her most recent imaging is as follows:\par 2021 - Left Uni Dx Mammo & Sono:\par -There are scattered areas of fibroglandular density\par -Postlumpectomy changes are seen in the outer left breast.\par -No suspicious abnormalities were seen sonographically in the left breast. Postsurgical changes are seen in the left breast at 3:00 location 10 cm from the nipple.\par Patient is due for a bilateral diagnostic mammogram in 2022\par BI-RADS 2: BENIGN \par \par INTERVAL HISTORY 5/3/22\gaston Esteban is here for her 3 months follow up visit\par She has no breast related complaints at this time.  She denies any breast pain, has not palpated any new palpable masses in either breast and denies any nipple discharge or retraction.\par \par Her imaging is as follows:\par 22 b/l dx mammo and US\par -scattered areas of fibroglandular density\par - Stable postlumpectomy changes are seen in the left breast.\par BI-RADS 2\par \par CATRACHO L-Dex Score: left arm 21.3\par Date: 5/3/22\par \par 10/26/2022 --\par GRAHAM STUBBS is a 71 year old female patient who presents today in follow up for stage IA ([id number]) IDC of left breast, G3, ER/UT/Her-2 negative, s/p left breast lumpectomy and SLNB on 21.\par \par Dr. Elvis Aj - completed adjuvant chemo with TC x 4 cycles.\par She had been started on adjuvant endocrine therapy with Anastrozole 1 mg daily - this was changed to Exemestane as of 2022.\par She has also completed radiation therapy with Dr. Steff Owen (2021 - 2022).\par \par She offers no new breast related complaints at this time. \par \par Her most recent imaging is as follows:\par Left Uni Dx Mammo - 10/17/2022:\par -There are scattered areas of fibroglandular density.\par -Stable postlumpectomy changes are seen in the left breast.\par -There is no mammographic evidence of malignancy.  \par BI-RADS 2: BENIGN\par \par She presents today for evaluation and imaging review.\par \par

## 2022-10-26 NOTE — DATA REVIEWED
[FreeTextEntry1] : Left Uni Dx Mammo - 10/17/2022:\par \par Breast composition: There are scattered areas of fibroglandular density.\par  \par No significant masses, calcifications, or other findings are seen. Stable postlumpectomy changes are seen in the left breast.\par \par IMPRESSION: \par  \par There is no mammographic evidence of malignancy.  The patient is due for a bilateral diagnostic mammogram in April 2023\par  \par A 6 month follow-up of both breast(s) is recommended.\par  \par The patient will be sent a normal letter.\par  \par  \par BI-RADS 2: BENIGN

## 2022-10-26 NOTE — REASON FOR VISIT
[Follow-Up: _____] : a [unfilled] follow-up visit [FreeTextEntry1] : stage IA ([id number]) IDC of left breast, G3, ER/NE/Her-2 negative; imaging review.

## 2022-11-03 ENCOUNTER — APPOINTMENT (OUTPATIENT)
Dept: ORTHOPEDIC SURGERY | Facility: CLINIC | Age: 72
End: 2022-11-03

## 2022-11-03 DIAGNOSIS — M25.511 PAIN IN RIGHT SHOULDER: ICD-10-CM

## 2022-11-03 PROCEDURE — 99203 OFFICE O/P NEW LOW 30 MIN: CPT

## 2022-11-03 NOTE — HISTORY OF PRESENT ILLNESS
[de-identified] : Patient is here for evaluation of right shoulder pain\par Affecting quality of life\par Has pain and weakness with loss of rom\par Wakes up at night due to pain\par \par NAD\par Right shoulder:\par TTP ant GH joint, bicipital groove\par FF 0-140 (passive 175)\par ER 40\par IR L5\par Pain with terminal rom\par Weakness to abduction and ER\par Pos Impingement\par Pos Jacobsen\par Pos Cross Arm Adduction\par Negative instability\par Positive scapula dyskinesia\par \par XRay right shoulder negative for fracture, dislocation, arthritis\par \par mri right shoulder: fRCT 1cm retraction, ac arthritis\par \par plan\par went over findings\par explained mri and rct\par will start nonop tx to regain rom and strength\par will cont to take advil as needed\par op vs nonop discussed but will cont cons tx\par fu in 2 months

## 2023-01-05 ENCOUNTER — APPOINTMENT (OUTPATIENT)
Dept: ORTHOPEDIC SURGERY | Facility: CLINIC | Age: 73
End: 2023-01-05

## 2023-03-08 ENCOUNTER — APPOINTMENT (OUTPATIENT)
Dept: HEMATOLOGY ONCOLOGY | Facility: CLINIC | Age: 73
End: 2023-03-08
Payer: MEDICARE

## 2023-03-08 ENCOUNTER — OUTPATIENT (OUTPATIENT)
Dept: OUTPATIENT SERVICES | Facility: HOSPITAL | Age: 73
LOS: 1 days | End: 2023-03-08
Payer: MEDICARE

## 2023-03-08 ENCOUNTER — APPOINTMENT (OUTPATIENT)
Dept: HEMATOLOGY ONCOLOGY | Facility: CLINIC | Age: 73
End: 2023-03-08

## 2023-03-08 VITALS
WEIGHT: 220 LBS | DIASTOLIC BLOOD PRESSURE: 90 MMHG | BODY MASS INDEX: 43.19 KG/M2 | HEART RATE: 87 BPM | SYSTOLIC BLOOD PRESSURE: 165 MMHG | TEMPERATURE: 98 F | OXYGEN SATURATION: 99 % | RESPIRATION RATE: 99 BRPM | HEIGHT: 60 IN

## 2023-03-08 DIAGNOSIS — C50.512 MALIGNANT NEOPLASM OF LOWER-OUTER QUADRANT OF LEFT FEMALE BREAST: ICD-10-CM

## 2023-03-08 DIAGNOSIS — Z96.652 PRESENCE OF LEFT ARTIFICIAL KNEE JOINT: Chronic | ICD-10-CM

## 2023-03-08 DIAGNOSIS — Z98.890 OTHER SPECIFIED POSTPROCEDURAL STATES: Chronic | ICD-10-CM

## 2023-03-08 PROCEDURE — 99214 OFFICE O/P EST MOD 30 MIN: CPT

## 2023-03-08 NOTE — PHYSICAL EXAM
[Fully active, able to carry on all pre-disease performance without restriction] : Status 0 - Fully active, able to carry on all pre-disease performance without restriction [Normal] : affect appropriate [de-identified] : Status post left breast lumpectomy and SLNB. Surgical incisions are healing well. There is no palpable abnormality.

## 2023-03-08 NOTE — ASSESSMENT
[FreeTextEntry1] : 70 yo female has stage IA ([id number]) IDC of left breast, G3, ER positive on biopsy but negative on surgical specimen, MS/Her-2 negative, s/p left breast lumpectomy and SLNB with negative margins.\par \par Germline genetic panel study (INVITAE) is negative for mutations.\par \par Assessment and Plan:\par -- Completed adjuvant chemo with TC x 4 cycles in 11/2021.\par -- S/P adjuvant breast radiotherapy [id number] between 12/27/21 to 1/25/22. \par -- Breast exam today shows no palpable abnormality. Left breast dx mammo and US in 10/2022 did not show suspicious finding. She has appt for b/l dcx mammo in 4/2023.\par -- Discussed AI related side effects. Continue exemestane daily.\par -- Dexa scan Dec 2021 was normal. She will c/w Ca and Vit D. Encourage health life style and regular physical activities.\par -- Peripheral neuropathy continue gabapentin. \par -- Follow up with breast surgeon and Dr. Owen as directed.  \par -- RTO for followup in 6 months. She will call if there is any new concern. \par \par

## 2023-03-08 NOTE — HISTORY OF PRESENT ILLNESS
[de-identified] : 69 yo female was referred by Dr. Bell for consultation of breast cancer. Eulalia had b/l screening mammo on 21 which showed asymmetry in the lateral left breast. On 21, left breast dx mammo and US showed hypoechoic mass, measures 8 x 7 x 13 mm  @3N10. 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 \par \par On 21, she had b/l breast MRI which showed Abnormal enhancement in the region of the biopsy corresponding to the site of index cancer. No other areas of abnormal enhancement in either breast\par \par On 21, she underwent left 3 N10 mass, radio frequency seed localized lumpectomy. 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 No lymphovascular invasion nor intraductal component is present. The surgical margins are negative. 6 sentinel lymph nodes are negative for malignancy. AJCC 8th Edition Pathologic Stage: pT1c, p(sn)N0, pMx. Biomarker study were done on surgical specimen. ER, DC and Her-2 (FISH ratio 1.3) are all negative. Ki 67 is 90%. \par \par Eulalia recovered well from surgery. She is here today to discuss adjuvant systemic therapy. \par \par She has a family history of breast cancer in her mother mother diagnosed with breast cancer at 40's. Her brother had esophageal cancer passed away at 45. She had germ line genetic test. The result is pending. \par \par She is  and was menopause at 49. \par left knee replacement. chronic pain joints. [de-identified] : \par 9/9/21andria Esteban is here today for follow up visit and to start chemotherapy. She has stage IA ([id number]) IDC of left breast, G3, ER/TX/Her-2 negative, s/p left breast lumpectomy and SLNB on 6/29/21 with negative margins. During her previous visit, we discussed adjuvant chemotherapy options. I also talked to her daughter who decided to let her mother take TC regimen x 4 cycles.  She had an unofficial second opinion from her daughter's contact at a [facility]. She had her port placed on 9/3/21 without any complications.  Today, she offers no new breast-related complaints.\par \par 9/30/21andria Esteban is here today for follow up visit and chemotherapy. She has stage IA ([id number]) IDC of left breast, G3, ER/TX/Her-2 negative, s/p left breast lumpectomy and SLNB on 6/29/21 with negative margins. She started adjuvant chemo with TC and had the 1st cycle three weeks. She had nausea for several days following chemo.  She used the compazine and zofran which caused some headaches. She did not have stomatitis, diarrhea or fever. \par \par 10/20/21andria Esteban is here today for follow up visit and chemotherapy. She has stage IA ([id number]) IDC of left breast, G3, ER/TX/Her-2 negative, s/p left breast lumpectomy and SLNB on 6/29/21 with negative margins. She started adjuvant chemo with TC and to date she received 2 cycles. She states 2 days after the treatment she developed pruritic and a pruritic rash on her head subsided with Benadryl and PO and Benadryl cream. She had nausea and fatigue for several days following chemo. She used the zofran which relieves nausea. She also reports numbness and tingling to left fingers started after 2nd treatment. She did not have stomatitis, diarrhea or fever. \par \par 11/11/21andria Esteban is here today for follow up visit and chemotherapy. She has stage IA ([id number]) IDC of left breast, G3, ER/TX/Her-2 negative, s/p left breast lumpectomy and SLNB on 6/29/21 with negative margins. She started adjuvant chemo with TC and to date she received 3 cycles. She states 2 days after the treatment she develops a pruritic erythematous rash on her abdomen and under bilateral breast. The rash subsides with Benadryl and PO and Benadryl cream. She had nausea and fatigue for several days following chemo. She used the zofran which relieves nausea. She also reports numbness and tingling to left fingers. When she went for chemo 10/20 the nurses had difficulty accessing port. She was sent to IR for port evaluation and revision. Port was resutured and working. On 11/2 she was seen by IR for follow up however port was noted to have drainage and pus, patient also had fever. Port was subsequently removed and she was started on Cephalexin 500 mg Q12 x 7 days (last dose tomorrow). A Midline line was inserted to right upper arm. She saw PMD last week for a non productive, chest xray was done and she was dx with bronchitis, she completed Zpak and continues prednisone. Also noted to have fluid in lungs, therefore PMD increased her water pill. She denies cough, fever, chills, SOB. \par \par 12/2/21andria Stevensonie is here today for follow up visit and chemotherapy. She has stage IA (kS1yP0C0) IDC of left breast, G3, ER/TX/Her-2 negative, s/p left breast lumpectomy and SLNB on 6/29/21 with negative margins. She started adjuvant chemo with TC and to date she received 4 cycles. Rash and itch has subsided since chemotherapy was completed. She still reports numbness and tingling to left fingers. When she went for chemo 10/20 the nurses had difficulty accessing port. She was sent to IR for port evaluation and revision. Port was resutured and working. On 11/2 she was seen by IR for follow up however port was noted to have drainage and pus, patient also had fever. Port was subsequently removed and a Midline line was inserted to right upper arm, which was removed after her last treatment. She denies cough, fever, chills, SOB. She saw Dr Owen today, plan to start RT in 1-2 weeks. \par \par 2/10/22andria Esteban is here today for follow up visit and chemotherapy. She has stage IA ([id number]) IDC of left breast, G3, ER/TX/Her-2 negative, s/p left breast lumpectomy and SLNB on 6/29/21 with negative margins. She completed adjuvant chemo with TC 11/2021 and radiation #20 on 1/31/20222 with Dr. Owen.  She stated on Anastrozole in 12/2021 and tolerating it well. She saw Dr. Bell recently and c/o neuropathy to her left hand/arm.  She was instructed to get medication from Dr. Aj.  \par \par \par 5/25/22Rajan Esteban is here today for follow up visit today.  She has stage IA ([id number]) IDC of left breast, G3, ER/TX/Her-2 negative, s/p left breast lumpectomy and SLNB on 6/29/21 with negative margins. She completed adjuvant chemo with TC 11/2021 and radiation #20 on 1/31/20222 with Dr. Owen.  She stated on Anastrozole in 12/2021 and has c/.o diffuse muscle/ joint aches that is impacting her quality of life.  Her last mammogram and US breast b/l April 2022 was BIRADS 2. \par \par 09/1/22andria Esteban is here today for follow up visit today. She has stage IA (sI8xN1V1) IDC of left breast, G3, ER/TX/Her-2 negative, s/p left breast lumpectomy and SLNB on 6/29/21 with negative margins. She completed adjuvant chemo with TC 11/2021 and radiation on 1/31/20222 with Dr. Owen.  She stated on Anastrozole in 12/2021 and has c/.o diffuse muscle/ joint aches that is impacting her quality of life, anastrozole was held for two week, arthralgia persisted. She started exemestane in 6/2022, tolerating well. Her last mammogram and US breast b/l April 2022 was BIRADS 2. She still reports peripheral neuropathy left hand, was taking gabapentin with relief, ran out of medication. \par \par 3/8/23:andria Esteban is here today for follow up visit today. She has stage IA ([id number]) IDC of left breast, G3, ER/TX/Her-2 negative, s/p left breast lumpectomy and SLNB on 6/29/21 with negative margins. She completed adjuvant chemo with TC 11/2021 and radiation on 1/31/20222 with Dr. Owen.  She stated on Anastrozole in 12/2021 and switched to exemestane in 6/2022. She still feels arthralgia but is able to stay on pill. Left breast dx mammo and US in 10/2022 did not show suspicious finding.  She complains left arm and hand tightness likely due to axillary surgery.

## 2023-03-09 DIAGNOSIS — C50.512 MALIGNANT NEOPLASM OF LOWER-OUTER QUADRANT OF LEFT FEMALE BREAST: ICD-10-CM

## 2023-03-09 DIAGNOSIS — Z51.81 ENCOUNTER FOR THERAPEUTIC DRUG LEVEL MONITORING: ICD-10-CM

## 2023-03-09 DIAGNOSIS — G62.9 POLYNEUROPATHY, UNSPECIFIED: ICD-10-CM

## 2023-04-11 ENCOUNTER — APPOINTMENT (OUTPATIENT)
Dept: RADIATION ONCOLOGY | Facility: HOSPITAL | Age: 73
End: 2023-04-11

## 2023-04-24 ENCOUNTER — APPOINTMENT (OUTPATIENT)
Dept: RADIATION ONCOLOGY | Facility: HOSPITAL | Age: 73
End: 2023-04-24
Payer: MEDICARE

## 2023-04-24 ENCOUNTER — OUTPATIENT (OUTPATIENT)
Dept: OUTPATIENT SERVICES | Facility: HOSPITAL | Age: 73
LOS: 1 days | End: 2023-04-24
Payer: MEDICARE

## 2023-04-24 VITALS
OXYGEN SATURATION: 99 % | RESPIRATION RATE: 12 BRPM | SYSTOLIC BLOOD PRESSURE: 160 MMHG | HEART RATE: 79 BPM | DIASTOLIC BLOOD PRESSURE: 79 MMHG | TEMPERATURE: 97.2 F | BODY MASS INDEX: 42.03 KG/M2 | WEIGHT: 215.2 LBS

## 2023-04-24 DIAGNOSIS — C50.412 MALIGNANT NEOPLASM OF UPPER-OUTER QUADRANT OF LEFT FEMALE BREAST: ICD-10-CM

## 2023-04-24 DIAGNOSIS — Z92.3 PERSONAL HISTORY OF IRRADIATION: ICD-10-CM

## 2023-04-24 DIAGNOSIS — Z98.890 OTHER SPECIFIED POSTPROCEDURAL STATES: Chronic | ICD-10-CM

## 2023-04-24 DIAGNOSIS — Z96.652 PRESENCE OF LEFT ARTIFICIAL KNEE JOINT: Chronic | ICD-10-CM

## 2023-04-24 PROCEDURE — 99213 OFFICE O/P EST LOW 20 MIN: CPT | Mod: TC

## 2023-04-24 PROCEDURE — 99213 OFFICE O/P EST LOW 20 MIN: CPT

## 2023-04-24 RX ORDER — GABAPENTIN 300 MG/1
300 CAPSULE ORAL
Qty: 30 | Refills: 3 | Status: DISCONTINUED | COMMUNITY
Start: 2022-02-10 | End: 2023-04-24

## 2023-04-24 NOTE — LETTER CLOSING
[Consult Closing:] : Thank you for allowing me to participate in the care of this patient.  If you have any questions, please do not hesitate to contact me. [Sincerely yours,] : Sincerely yours, [FreeTextEntry3] : Steff Owen M.D. \par \par Electronically proofread and signed by:  Steff Owen MD\par Attending, Department of Radiation Medicine\par [facility]\par \par CC: Dr. Bell

## 2023-04-24 NOTE — DISEASE MANAGEMENT
[Pathological] : TNM Stage: p [IB] : IB [FreeTextEntry4] : invasive ductal carcinoma of the left breast, ER (pos)/KS/HER2 negative, upper outer quadrant  [TTNM] : 1c [NTNM] : 0 [MTNM] : 0

## 2023-04-24 NOTE — PHYSICAL EXAM
[Sclera] : the sclera and conjunctiva were normal [Hearing Threshold Finger Rub Not [place]] : hearing was normal [] : no respiratory distress [Respiration, Rhythm And Depth] : normal respiratory rhythm and effort [Exaggerated Use Of Accessory Muscles For Inspiration] : no accessory muscle use [Heart Rate And Rhythm] : heart rate and rhythm were normal [Edema] : no peripheral edema present [Rt > Lt] : the right breast was larger than the left [No Discharge] : no discharge [No Masses] : no breast masses were palpable [Abdomen Soft] : soft [Nondistended] : nondistended [Abdomen Tenderness] : non-tender [Cervical Lymph Nodes Enlarged Posterior Bilaterally] : posterior cervical [Cervical Lymph Nodes Enlarged Anterior Bilaterally] : anterior cervical [Supraclavicular Lymph Nodes Enlarged Bilaterally] : supraclavicular [Nail Clubbing] : no clubbing  or cyanosis of the fingernails [Skin Color & Pigmentation] : normal skin color and pigmentation [No Focal Deficits] : no focal deficits [Motor Exam] : the motor exam was normal [Enlargement Of The Right Breast] : no swelling [Tenderness Of The Right Breast] : no tenderness [___] :  no erythema [Enlargement Of The Left Breast] : no swelling [Tenderness Of The Left Breast] : no tenderness [Axillary Lymph Nodes Enlarged Bilaterally] : no enlarged nodes [Normal] : no palpable adenopathy

## 2023-04-24 NOTE — REVIEW OF SYSTEMS
[Joint Pain] : joint pain [Muscle Pain] : muscle pain [Negative] : Psychiatric [Fever] : no fever [Chills] : no chills [Fatigue] : no fatigue [Dysphagia] : no dysphagia [Chest Pain] : no chest pain [Palpitations] : no palpitations [Shortness Of Breath] : no shortness of breath [Wheezing] : no wheezing [Cough] : no cough [FreeTextEntry9] : right shoulder, right knee

## 2023-04-24 NOTE — HISTORY OF PRESENT ILLNESS
[FreeTextEntry1] : GRAHAM STUBBS returns to clinic in follow up visit.  As you know, GRAHAM STUBBS is a 71 year old female with invasive ductal carcinoma of the left breast, ER (pos)/LA/HER2 negative, [id number], Stage I. She is s/p breast conserving surgery and chemotherapy. The patient was treated to the left breast using a 3D/conformal technique.  The patient tolerated treatments quite well. The patient had the expected side effects of skin erythema and fatigue.  The patient received 5240 cGy to the left breast from 12/27/2021 through 1/25/2022  I last saw her in March, 2022 .    In the interim, she has done well. She denies breast pain and has no breast concerns.   She continues on exemestane.   Dx b/l mammo on 10/17//2022 shows no evidence of malignancy.  Her upcoming B/L mammo is scheduled on 5/3/2023 at [facility] radiology.  She is in the process of scheduling right knee replacement with HSS in May, 2023.  Otherwise,  she continues to be independent, active with ADLs and \par ambulates with a cane. \par \par Upcoming appointments: \par Vee 4/26/2023\par Dr. Aj 9/20/2023\par Mammo & US upcoming 5/3/2023 [facility]

## 2023-04-25 DIAGNOSIS — C50.412 MALIGNANT NEOPLASM OF UPPER-OUTER QUADRANT OF LEFT FEMALE BREAST: ICD-10-CM

## 2023-05-16 ENCOUNTER — APPOINTMENT (OUTPATIENT)
Dept: BREAST CENTER | Facility: CLINIC | Age: 73
End: 2023-05-16
Payer: MEDICARE

## 2023-05-16 VITALS
SYSTOLIC BLOOD PRESSURE: 151 MMHG | BODY MASS INDEX: 41.23 KG/M2 | WEIGHT: 210 LBS | HEIGHT: 60 IN | DIASTOLIC BLOOD PRESSURE: 90 MMHG

## 2023-05-16 DIAGNOSIS — C50.412 MALIGNANT NEOPLASM OF UPPER-OUTER QUADRANT OF LEFT FEMALE BREAST: ICD-10-CM

## 2023-05-16 DIAGNOSIS — Z17.0 MALIGNANT NEOPLASM OF UPPER-OUTER QUADRANT OF LEFT FEMALE BREAST: ICD-10-CM

## 2023-05-16 PROCEDURE — 99213 OFFICE O/P EST LOW 20 MIN: CPT

## 2023-05-16 NOTE — PHYSICAL EXAM
[Normocephalic] : normocephalic [Atraumatic] : atraumatic [No Supraclavicular Adenopathy] : no supraclavicular adenopathy [No dominant masses] : no dominant masses in right breast  [No dominant masses] : no dominant masses left breast [No Nipple Discharge] : no left nipple discharge [No Rashes] : no rashes [No Ulceration] : no ulceration [Breast Nipple Inversion] : nipples not inverted [Breast Nipple Retraction] : nipples not retracted [de-identified] : no suspicious abnormalities palpated within either breast, no lymphadenopathy  [de-identified] : both surgical incisions are well healed; palpable area of scar tissue noted - approx 2x2cm.  [de-identified] : No axillary lymphadenopathy appreciated. [de-identified] : No axillary lymphadenopathy appreciated. [de-identified] : SOZO: 23.5 --> 12.9 --> 14.5 [de-identified] : some hyperpigmentation of the left breast secondary to RT noted.

## 2023-05-16 NOTE — REASON FOR VISIT
[Follow-Up: _____] : a [unfilled] follow-up visit [FreeTextEntry1] : stage IA ([id number]) IDC of left breast, G3, ER/OR/Her-2 negative; imaging review.

## 2023-05-16 NOTE — ASSESSMENT
[FreeTextEntry1] : Eulalia is a 72 F with a left breast IDC,  ER weakly positive, OH neg, Her 2 neg, [id number], stage 1 breast cancer, s/p L WLE and SLN Bx on 6/29/2021. \par \par 6/29/2021 -- L WLE and SLN Bx \par 1. L breast mass @3N10 \par -IDC, poorly differentiated, medullary features \par -no intraductal component\par -no LVI \par -ER weakly positive, OH neg, Her 2 neg\par -T = 1.1 cm \par -closest margins was 0.5mm from superior margin and 1 mm from anterior margin\par -additional margins were all negative for carcinoma (superior, inferior, anterior, medial, lateral, posterior) \par \par 2. L axillary sentinel lymph node biopsy \par -0/6 LN positive for mets \par -[id number]\par \par On physical exam, no suspicious abnormalities palpated within either breast, no lymphadenopathy. left breast - both surgical incisions are healing well; palpable area of scar tissue noted - approx 2x2cm. \par \par Dr. Elvis Aj - completed adjuvant chemo with TC x 4 cycles.\par She had been started on adjuvant endocrine therapy with Anastrozole 1 mg daily - this was changed to Exemestane as of June 2022.\par She has also completed radiation therapy with Dr. Steff Owen (12/27/2021 - 01/25/2022).\par \par Her most recent imaging is as follows:\par B/L Dx Mammo - 05/09/2023:\par - There are scattered areas of fibroglandular density.\par - Stable postlumpectomy changes are seen in the left breast.\par - There is no mammographic evidence of malignancy. \par BI-RADS 2: BENIGN\par \par She will be due for a B/L Screening Mammo in May 2024. This will be ordered for her today.  She can follow up after for a CBE. \par ---\par \par Her final pathology revealed a 1.1 cm IDC, poorly differentiated, with medullary features, ER weakly positive, OH neg, Her 2 neg, no LVI, no intraductal component, negative surgical margins and 6 benign left axillary sentinel lymph nodes for a [id number] breast cancer. \par \par \par ---\par AS REVIEW: \par Her most recent imaging was a b/l screening mammogram and subsequent L dx mammogram and US On 4/29/2021 and 5/14/2021 which revealed @3N10, in her left breast, her biopsy proven cancer, measuring 8 x 7 x 13 mm. \par \par We have discussed her new diagnosis of breast cancer.  She is currently a stage 1 breast cancer, based off AJCC 8th edition.  We discussed her surgical and other treatment options at this time. \par \par In regards to her left sided breast cancer, she is a great candidate for either breast conservation surgery or a mastectomy.  Her lesion is located @3N10   based off the US so she is a candidate for a nipple sparing mastectomy.  However, there is no difference in survival between a lumpectomy + radiation therapy and a mastectomy.  However the rate of local recurrence is slightly higher with the lumpectomy. The rate of recurrence with a mastectomy is not zero, however, and is likely closer to 4-9%.  \par \par Regardless of if she chooses a mastectomy or a lumpectomy, she would require an evaluation of her axillary lymph nodes via a sentinel lymph node biopsy.  This is done by injecting the perioareolar breast tissue with dye prior to the surgery and removing 1-5 lymph nodes that are the first to drain the breast.  \par \par At this time, she is leaning towards undergoing breast conservation therapy with a LUMPECTOMY.  Her left breast lesion is not palpable on clinical exam, so she will need a radiofrequency guided lumpectomy on the left breast.  In the interim, we will obtain a bilateral breast MRI to further evaluate the extent of her disease. \par \par We did briefly discuss the different radiation options.  If she got a mastectomy, she would likely only need radiation therapy if she had a lot of positive margins. If she undergoes a lumpectomy, she is a candidate for both partial breast irradiation and whole breast irradiation.  We briefly discussed the differences between these two modalities.  \par \par In regards to systemic therapy, we briefly discussed both chemotherapy and endocrine therapy. If the tumor is larger than 1 cm and her 2 negative, we would likely need to send an additional study on her tumor sample, called an oncotype DX to make the determination regarding if chemotherapy would help her.  At the completion of all these treatments, she should get endocrine therapy, at current time she would need an AI, for a total of at least 5 years, although her ER is only weakly positive so she may not derive as much benefit from the endocrine therapy.  \par \par Per ASBrS consensus guidelines, any patient with a diagnosis of breast cancer should be offered panel genetic testing as 10% of these patients were found to have a mutation. In addition, her mother also had breast cancer, diagnosed at age 52. THis was offered to her and she would like to proceed with panel genetic testing.  Her blood was drawn today.\par \par ---\par I spent a total of 20 minutes of face to face time with this patient, greater than 50% of which was spent in counseling and/or coordination of care.\par All of her questions were appropriately answered.\par She knows to call with any concerns.\par \par \par PLAN\par -SOZO: 23.5 --> 12.9 --> 14.5 this is a change from her baseline - she has been referred to lymphedema specialist for prevention measures - uses compression sleeve/gauntlet.\par -INVITAE panel genetic testing -- blood drawn -- results negative \par -Continue f/u with Med Onc as scheduled.\par -Continue f/u with Rad Onc as scheduled.\par -B/L Screening Mammo - May 2024.\par -f/up after

## 2023-05-16 NOTE — DATA REVIEWED
[FreeTextEntry1] : B/L Dx Mammo - 05/09/2023:\par Tomosynthesis 3D and 2D imaging of the breast(s) were performed.  Current study was also evaluated with a computer aided detection (CAD) system.\par  \par Breast composition: There are scattered areas of fibroglandular density.\par  \par No significant masses, calcifications, or other findings are seen. Stable postlumpectomy changes are seen in the left breast.\par IMPRESSION: \par  \par There is no mammographic evidence of malignancy. \par  \par A 1 year screening mammogram of both breast(s) is recommended.\par  \par The patient will be sent a normal letter.\par  \par The findings and recommendations were discussed with the patient.\par  \par BI-RADS 2: BENIGN

## 2023-05-16 NOTE — HISTORY OF PRESENT ILLNESS
[FreeTextEntry1] : Graham is a 70 F with a left breast IDC,  ER weakly positive, [place] neg, Her 2 neg, [id number], stage 1 breast cancer, s/p L WLE and SLN Bx on 2021. \par \par 2021 -- L WLE and SLN Bx \par 1. L breast mass @3N10 \par -IDC, poorly differentiated, medullary features \par -no intraductal component\par -no LVI \par -ER weakly positive, AZ neg, Her 2 neg\par -T = 1.1 cm \par -closest margins was 0.5mm from superior margin and 1 mm from anterior margin\par -additional margins were all negative for carcinoma (superior, inferior, anterior, medial, lateral, posterior) \par \par 2. L axillary sentinel lymph node biopsy \par -0 LN positive for mets \par -[id number]\par \par Graham presented today with her friend, Silvia who was present for the entirety of the consultation. \par \par She has no breast related complaints at this time.  She denies any breast pain, has not palpated any new palpable masses in either breast and denies any nipple discharge or retraction.  \par \par Her work up was as follows: \par 2021 -- b/l screening mammogram \par -R: -no suspicious mass, microcalcifications or architectural distortion\par -L: in lateral breast, asymmetry present \par BIRADS 0 \par \par 2021 -- L dx mammogram and US \par -scattered areas of fibroglandular densities\par -L: @3:00, persistent mass \par L US \par -@3N10, hypoechoic mass, measures 8 x 7 x 13 mm --> BIOPSY \par BIRADS 4c \par \par 2021 -- L US CNBx @3N10 (coil clip) \par -IDC, SBR 3, poorly differentiated with associated lymphocytic reaction \par -ER weakly positive, AZ neg (Toni 5/0), Her 2 neg \par \par HISTORICAL RISK FACTORS: \par -no prior breast biopsies, history of L breast lumpectomy for a cyst in the  \par -family history of breast cancer in her mother at age 52\par -, age at first live birth was 22 \par -prior OCP use x 15 years, stopped >30 years prior \par -no gyn surgeries\par \par INTERVAL HISTORY: andria Esteban returns for her FIRST POST OP VISIT, s/p L WLE and SLN Bx on 2021. \par \par Her final pathology revealed a 1.1 cm IDC, poorly differentiated, with medullary features, ER weakly positive, AZ neg, Her 2 neg, no LVI, no intraductal component, negative surgical margins and 6 benign left axillary sentinel lymph nodes for a [id number] breast cancer. \par \par She is healing well from her recent surgery.  She has some soreness at her surgical site, but denies any pain, redness, fevers or chills. \par \par 2022 --\par GRAHAM STUBBS is a 71 year old female patient who presents today in follow up for stage IA ([id number]) IDC of left breast, G3, ER/AZ/Her-2 negative, s/p left breast lumpectomy and SLNB on 21.\par \par Dr. Elvis Aj - completed adjuvant chemo with TC x 4 cycles.\par She has been started on adjuvant endocrine therapy with Anastrozole 1 mg daily.\par She has also recently completed radiation therapy with Dr. Steff Owen (2021 - 2022).\par \par Her most recent imaging is as follows:\par 2021 - Left Uni Dx Mammo & Sono:\par -There are scattered areas of fibroglandular density\par -Postlumpectomy changes are seen in the outer left breast.\par -No suspicious abnormalities were seen sonographically in the left breast. Postsurgical changes are seen in the left breast at 3:00 location 10 cm from the nipple.\par Patient is due for a bilateral diagnostic mammogram in 2022\par BI-RADS 2: BENIGN \par \par INTERVAL HISTORY 5/3/22\gaston Esteban is here for her 3 months follow up visit\par She has no breast related complaints at this time.  She denies any breast pain, has not palpated any new palpable masses in either breast and denies any nipple discharge or retraction.\par \par Her imaging is as follows:\par 22 b/l dx mammo and US\par -scattered areas of fibroglandular density\par - Stable postlumpectomy changes are seen in the left breast.\par BI-RADS 2\par \par SOZO L-Dex Score: left arm 21.3\par Date: 5/3/22\par \par 10/26/2022 --\par GRAHAM STUBBS is a 71 year old female patient who presents today in follow up for stage IA ([id number]) IDC of left breast, G3, ER/AZ/Her-2 negative, s/p left breast lumpectomy and SLNB on 21.\par \par Dr. Elvis Aj - completed adjuvant chemo with TC x 4 cycles.\par She had been started on adjuvant endocrine therapy with Anastrozole 1 mg daily - this was changed to Exemestane as of 2022.\par She has also completed radiation therapy with Dr. Steff Owen (2021 - 2022).\par \par She offers no new breast related complaints at this time. \par \par Her most recent imaging is as follows:\par Left Uni Dx Mammo - 10/17/2022:\par -There are scattered areas of fibroglandular density.\par -Stable postlumpectomy changes are seen in the left breast.\par -There is no mammographic evidence of malignancy.  \par BI-RADS 2: BENIGN\par \par She presents today for evaluation and imaging review.\par \par 2023 --\par GRAHAM STUBBS is a 72 year old female patient who presents today in follow up for stage IA ([id number]) IDC of left breast, G3, ER/AZ/Her-2 negative, s/p left breast lumpectomy and SLNB on 21. (ER weakly positive on bx).\par \par Dr. Elvis Aj - completed adjuvant chemo with TC x 4 cycles.\par She had been started on adjuvant endocrine therapy with Anastrozole 1 mg daily - this was changed to Exemestane as of 2022.\par She has also completed radiation therapy with Dr. Steff Owen (2021 - 2022).\par \par She offers no new breast related complaints at this time. \par \par Her most recent imaging is as follows:\par B/L Dx Mammo - 2023:\par - There are scattered areas of fibroglandular density.\par - Stable postlumpectomy changes are seen in the left breast.\par - There is no mammographic evidence of malignancy. \par BI-RADS 2: BENIGN\par \par She presents today for evaluation and imaging review.\par

## 2023-06-27 NOTE — ASU PREOP CHECKLIST - BP NONINVASIVE SYSTOLIC (MM HG)
143 Keystone Flap Text: The defect edges were debeveled with a #15 scalpel blade.  Given the location of the defect, shape of the defect a keystone flap was deemed most appropriate.  Using a sterile surgical marker, an appropriate keystone flap was drawn incorporating the defect, outlining the appropriate donor tissue and placing the expected incisions within the relaxed skin tension lines where possible. The area thus outlined was incised deep to adipose tissue with a #15 scalpel blade.  The skin margins were undermined to an appropriate distance in all directions around the primary defect and laterally outward around the flap utilizing iris scissors.

## 2023-08-02 PROCEDURE — XXXXX: CPT

## 2023-09-20 ENCOUNTER — APPOINTMENT (OUTPATIENT)
Dept: HEMATOLOGY ONCOLOGY | Facility: CLINIC | Age: 73
End: 2023-09-20
Payer: MEDICARE

## 2023-09-20 ENCOUNTER — OUTPATIENT (OUTPATIENT)
Dept: OUTPATIENT SERVICES | Facility: HOSPITAL | Age: 73
LOS: 1 days | End: 2023-09-20
Payer: MEDICARE

## 2023-09-20 VITALS
WEIGHT: 224 LBS | TEMPERATURE: 98.2 F | HEART RATE: 80 BPM | SYSTOLIC BLOOD PRESSURE: 175 MMHG | RESPIRATION RATE: 12 BRPM | DIASTOLIC BLOOD PRESSURE: 80 MMHG | OXYGEN SATURATION: 98 % | HEIGHT: 60 IN | BODY MASS INDEX: 43.98 KG/M2

## 2023-09-20 DIAGNOSIS — Z98.890 OTHER SPECIFIED POSTPROCEDURAL STATES: Chronic | ICD-10-CM

## 2023-09-20 DIAGNOSIS — C50.512 MALIGNANT NEOPLASM OF LOWER-OUTER QUADRANT OF LEFT FEMALE BREAST: ICD-10-CM

## 2023-09-20 DIAGNOSIS — Z96.652 PRESENCE OF LEFT ARTIFICIAL KNEE JOINT: Chronic | ICD-10-CM

## 2023-09-20 DIAGNOSIS — Z51.81 ENCOUNTER FOR THERAPEUTIC DRUG LEVEL MONITORING: ICD-10-CM

## 2023-09-20 DIAGNOSIS — G62.9 POLYNEUROPATHY, UNSPECIFIED: ICD-10-CM

## 2023-09-20 DIAGNOSIS — R20.0 ANESTHESIA OF SKIN: ICD-10-CM

## 2023-09-20 PROCEDURE — 99214 OFFICE O/P EST MOD 30 MIN: CPT

## 2023-10-15 ENCOUNTER — NON-APPOINTMENT (OUTPATIENT)
Age: 73
End: 2023-10-15

## 2024-03-20 ENCOUNTER — APPOINTMENT (OUTPATIENT)
Dept: HEMATOLOGY ONCOLOGY | Facility: CLINIC | Age: 74
End: 2024-03-20

## 2024-03-20 ENCOUNTER — OUTPATIENT (OUTPATIENT)
Dept: OUTPATIENT SERVICES | Facility: HOSPITAL | Age: 74
LOS: 1 days | End: 2024-03-20
Payer: MEDICARE

## 2024-03-20 ENCOUNTER — APPOINTMENT (OUTPATIENT)
Dept: HEMATOLOGY ONCOLOGY | Facility: CLINIC | Age: 74
End: 2024-03-20
Payer: MEDICARE

## 2024-03-20 VITALS
TEMPERATURE: 98.6 F | SYSTOLIC BLOOD PRESSURE: 154 MMHG | DIASTOLIC BLOOD PRESSURE: 78 MMHG | BODY MASS INDEX: 43.98 KG/M2 | OXYGEN SATURATION: 99 % | HEART RATE: 84 BPM | WEIGHT: 224 LBS | RESPIRATION RATE: 19 BRPM | HEIGHT: 60 IN

## 2024-03-20 DIAGNOSIS — Z79.811 ENCOUNTER FOR THERAPEUTIC DRUG LVL MONITORING: ICD-10-CM

## 2024-03-20 DIAGNOSIS — G62.9 POLYNEUROPATHY, UNSPECIFIED: ICD-10-CM

## 2024-03-20 DIAGNOSIS — Z96.652 PRESENCE OF LEFT ARTIFICIAL KNEE JOINT: Chronic | ICD-10-CM

## 2024-03-20 DIAGNOSIS — Z98.890 OTHER SPECIFIED POSTPROCEDURAL STATES: Chronic | ICD-10-CM

## 2024-03-20 DIAGNOSIS — D64.9 ANEMIA, UNSPECIFIED: ICD-10-CM

## 2024-03-20 DIAGNOSIS — Z51.81 ENCOUNTER FOR THERAPEUTIC DRUG LVL MONITORING: ICD-10-CM

## 2024-03-20 PROCEDURE — 99214 OFFICE O/P EST MOD 30 MIN: CPT

## 2024-03-20 RX ORDER — GABAPENTIN 300 MG/1
300 CAPSULE ORAL
Qty: 30 | Refills: 2 | Status: ACTIVE | COMMUNITY
Start: 2023-09-20 | End: 1900-01-01

## 2024-03-20 RX ORDER — EXEMESTANE 25 MG/1
25 TABLET, FILM COATED ORAL
Qty: 90 | Refills: 2 | Status: ACTIVE | COMMUNITY
Start: 2022-05-25 | End: 1900-01-01

## 2024-03-21 DIAGNOSIS — D64.9 ANEMIA, UNSPECIFIED: ICD-10-CM

## 2024-03-24 PROBLEM — G62.9 PERIPHERAL NEUROPATHY: Status: ACTIVE | Noted: 2022-02-10

## 2024-03-24 PROBLEM — Z51.81 ENCOUNTER FOR MONITORING AROMATASE INHIBITOR THERAPY: Status: ACTIVE | Noted: 2022-02-12

## 2024-05-13 ENCOUNTER — OUTPATIENT (OUTPATIENT)
Dept: OUTPATIENT SERVICES | Facility: HOSPITAL | Age: 74
LOS: 1 days | End: 2024-05-13
Payer: MEDICARE

## 2024-05-13 ENCOUNTER — APPOINTMENT (OUTPATIENT)
Dept: RADIATION ONCOLOGY | Facility: HOSPITAL | Age: 74
End: 2024-05-13
Payer: MEDICARE

## 2024-05-13 VITALS
BODY MASS INDEX: 43.65 KG/M2 | SYSTOLIC BLOOD PRESSURE: 139 MMHG | RESPIRATION RATE: 16 BRPM | DIASTOLIC BLOOD PRESSURE: 80 MMHG | TEMPERATURE: 98.1 F | WEIGHT: 223.5 LBS | HEART RATE: 79 BPM | OXYGEN SATURATION: 99 %

## 2024-05-13 DIAGNOSIS — C50.412 MALIGNANT NEOPLASM OF UPPER-OUTER QUADRANT OF LEFT FEMALE BREAST: ICD-10-CM

## 2024-05-13 DIAGNOSIS — Z98.890 OTHER SPECIFIED POSTPROCEDURAL STATES: Chronic | ICD-10-CM

## 2024-05-13 DIAGNOSIS — Z17.0 MALIGNANT NEOPLASM OF LOWER-OUTER QUADRANT OF LEFT FEMALE BREAST: ICD-10-CM

## 2024-05-13 DIAGNOSIS — C50.512 MALIGNANT NEOPLASM OF LOWER-OUTER QUADRANT OF LEFT FEMALE BREAST: ICD-10-CM

## 2024-05-13 DIAGNOSIS — Z96.652 PRESENCE OF LEFT ARTIFICIAL KNEE JOINT: Chronic | ICD-10-CM

## 2024-05-13 PROCEDURE — 99213 OFFICE O/P EST LOW 20 MIN: CPT

## 2024-05-13 RX ORDER — POTASSIUM CHLORIDE 1500 MG/1
20 TABLET, EXTENDED RELEASE ORAL DAILY
Refills: 0 | Status: DISCONTINUED | COMMUNITY
Start: 2021-09-30 | End: 2024-05-13

## 2024-05-13 RX ORDER — CHLORTHALIDONE 25 MG/1
25 TABLET ORAL DAILY
Refills: 0 | Status: DISCONTINUED | COMMUNITY
End: 2024-05-13

## 2024-05-13 RX ORDER — HYDROCHLOROTHIAZIDE 12.5 MG/1
12.5 CAPSULE ORAL
Refills: 0 | Status: ACTIVE | COMMUNITY

## 2024-05-15 PROBLEM — C50.512 MALIGNANT NEOPLASM OF LOWER-OUTER QUADRANT OF LEFT BREAST OF FEMALE, ESTROGEN RECEPTOR POSITIVE: Status: ACTIVE | Noted: 2021-06-03

## 2024-05-15 NOTE — LETTER CLOSING
[Consult Closing:] : Thank you for allowing me to participate in the care of this patient.  If you have any questions, please do not hesitate to contact me. [Sincerely yours,] : Sincerely yours, [FreeTextEntry3] : Steff Owen M.D.   Electronically proofread and signed by:  Steff Owen MD Attending, Department of Radiation Medicine [facility]

## 2024-05-15 NOTE — PHYSICAL EXAM
[Sclera] : the sclera and conjunctiva were normal [] : no respiratory distress [Exaggerated Use Of Accessory Muscles For Inspiration] : no accessory muscle use [Heart Rate And Rhythm] : heart rate and rhythm were normal [Heart Sounds] : normal S1 and S2 [Cervical Lymph Nodes Enlarged Posterior Bilaterally] : posterior cervical [Cervical Lymph Nodes Enlarged Anterior Bilaterally] : anterior cervical [Supraclavicular Lymph Nodes Enlarged Bilaterally] : supraclavicular [Axillary Lymph Nodes Enlarged Bilaterally] : axillary [Normal] : oriented to person, place and time, the affect was normal, the mood was normal and not anxious [de-identified] : She is examined in both the upright and supine positions.  The right breast ---.   The left breast ----.  No palpable mass in either breast.

## 2024-05-15 NOTE — HISTORY OF PRESENT ILLNESS
[FreeTextEntry1] : GRAHAM STUBBS returns to clinic in follow up visit.  As you know, GRAHAM STUBBS is a 73 year old female with invasive ductal carcinoma of the left breast, ER (pos)/ID/HER2 negative, [id number], Stage I. She is s/p breast conserving surgery and chemotherapy. The patient was treated to the left breast using a 3D/conformal technique.  The patient tolerated treatments quite well. The patient had the expected side effects of skin erythema and fatigue.  The patient received 5240 cGy to the left breast from 2021 through 2022.  I last saw her in 2023. In the interim, she has done well. She denies breast pain and has no breast concerns. She continues on exemestane, tolerating it well.  She is scheduled for bilateral mammogram 2024. Otherwise, she is doing well. She did not go for the right knee replacement as planned. She plans to hold off at this time. she continues to be independent, active with ADLs and ambulates with a cane.   Interim imagin23 Bilateral diagnostic mammogram showed no mammographic evidence of malignancy.  Bi-RADS 2: BENIGN   Upcoming appointments:  Vee PADILLA (Breast Sx) 24 [place] FLOWER (Med/onc) 24

## 2024-05-15 NOTE — DISEASE MANAGEMENT
[Pathological] : TNM Stage: p [IB] : IB [FreeTextEntry4] : invasive ductal carcinoma of the left breast, ER (pos)/NE/HER2 negative, upper outer quadrant  [TTNM] : 1c [NTNM] : 0 [MTNM] : 0

## 2024-05-15 NOTE — REVIEW OF SYSTEMS
[Joint Pain] : joint pain [Gait Disturbance] : gait disturbance [Negative] : Allergic/Immunologic [Muscle Pain] : no muscle pain [Muscle Weakness] : no muscle weakness

## 2024-05-21 ENCOUNTER — APPOINTMENT (OUTPATIENT)
Dept: BREAST CENTER | Facility: CLINIC | Age: 74
End: 2024-05-21
Payer: MEDICARE

## 2024-05-21 VITALS
DIASTOLIC BLOOD PRESSURE: 90 MMHG | HEIGHT: 59 IN | BODY MASS INDEX: 44.35 KG/M2 | WEIGHT: 220 LBS | SYSTOLIC BLOOD PRESSURE: 137 MMHG | HEART RATE: 91 BPM

## 2024-05-21 DIAGNOSIS — C50.912 MALIGNANT NEOPLASM OF UNSPECIFIED SITE OF LEFT FEMALE BREAST: ICD-10-CM

## 2024-05-21 PROCEDURE — 99214 OFFICE O/P EST MOD 30 MIN: CPT

## 2024-05-24 PROBLEM — C50.912 CANCER OF LEFT BREAST: Status: ACTIVE | Noted: 2021-06-03

## 2024-05-24 NOTE — HISTORY OF PRESENT ILLNESS
[FreeTextEntry1] : Graham is a 70 F with a left breast IDC,  ER weakly positive, AZ neg, Her 2 neg, [id number], stage 1 breast cancer, s/p L WLE and SLN Bx on 2021.   2021 -- L WLE and SLN Bx  1. L breast mass @3N10  -IDC, poorly differentiated, medullary features  -no intraductal component -no LVI  -ER weakly positive, AZ neg, Her 2 neg -T = 1.1 cm  -closest margins was 0.5mm from superior margin and 1 mm from anterior margin -additional margins were all negative for carcinoma (superior, inferior, anterior, medial, lateral, posterior)   2. L axillary sentinel lymph node biopsy  -0/ LN positive for mets  -[id number]  Graham presented today with her friend, Silvia who was present for the entirety of the consultation.   She has no breast related complaints at this time.  She denies any breast pain, has not palpated any new palpable masses in either breast and denies any nipple discharge or retraction.    Her work up was as follows:  2021 -- b/l screening mammogram  -R: -no suspicious mass, microcalcifications or architectural distortion -L: in lateral breast, asymmetry present  BIRADS 0   2021 -- L dx mammogram and US  -scattered areas of fibroglandular densities -L: @3:00, persistent mass  L US  -@3N10, hypoechoic mass, measures 8 x 7 x 13 mm --> BIOPSY  BIRADS 4c   2021 -- L US CNBx @3N10 (coil clip)  -IDC, SBR 3, poorly differentiated with associated lymphocytic reaction  -ER weakly positive, AZ neg (Toni 5/0), Her 2 neg   HISTORICAL RISK FACTORS:  -no prior breast biopsies, history of L breast lumpectomy for a cyst in the   -family history of breast cancer in her mother at age 52 -, age at first live birth was 22  -prior OCP use x 15 years, stopped >30 years prior  -no gyn surgeries  INTERVAL HISTORY:  Graham returns for her FIRST POST OP VISIT, s/p L WLE and SLN Bx on 2021.   Her final pathology revealed a 1.1 cm IDC, poorly differentiated, with medullary features, ER weakly positive, AZ neg, Her 2 neg, no LVI, no intraductal component, negative surgical margins and 6 benign left axillary sentinel lymph nodes for a [id number] breast cancer.   She is healing well from her recent surgery.  She has some soreness at her surgical site, but denies any pain, redness, fevers or chills.   2022 -- GRAHAM STUBBS is a 71 year old female patient who presents today in follow up for stage IA ([id number]) IDC of left breast, G3, ER/AZ/Her-2 negative, s/p left breast lumpectomy and SLNB on 21.  Dr. Elvis Aj - completed adjuvant chemo with TC x 4 cycles. She has been started on adjuvant endocrine therapy with Anastrozole 1 mg daily. She has also recently completed radiation therapy with Dr. Steff Owen (2021 - 2022).  Her most recent imaging is as follows: 2021 - Left Uni Dx Mammo & Sono: -There are scattered areas of fibroglandular density -Postlumpectomy changes are seen in the outer left breast. -No suspicious abnormalities were seen sonographically in the left breast. Postsurgical changes are seen in the left breast at 3:00 location 10 cm from the nipple. Patient is due for a bilateral diagnostic mammogram in 2022 BI-RADS 2: BENIGN   INTERVAL HISTORY 5/3/22 Graham is here for her 3 months follow up visit She has no breast related complaints at this time.  She denies any breast pain, has not palpated any new palpable masses in either breast and denies any nipple discharge or retraction.  Her imaging is as follows: 22 b/l dx mammo and US -scattered areas of fibroglandular density - Stable postlumpectomy changes are seen in the left breast. BI-RADS 2  SOZO L-Dex Score: left arm 21.3 Date: 5/3/22  10/26/2022 -- GRAHAM STUBBS is a 71 year old female patient who presents today in follow up for stage IA ([id number]) IDC of left breast, G3, ER/AZ/Her-2 negative, s/p left breast lumpectomy and SLNB on 21.  Dr. Elvis Aj - completed adjuvant chemo with TC x 4 cycles. She had been started on adjuvant endocrine therapy with Anastrozole 1 mg daily - this was changed to Exemestane as of 2022. She has also completed radiation therapy with Dr. Steff Owen (2021 - 2022).  She offers no new breast related complaints at this time.   Her most recent imaging is as follows: Left Uni Dx Mammo - 10/17/2022: -There are scattered areas of fibroglandular density. -Stable postlumpectomy changes are seen in the left breast. -There is no mammographic evidence of malignancy.   BI-RADS 2: BENIGN  She presents today for evaluation and imaging review.  2023 -- GRAHAM STUBBS is a 72 year old female patient who presents today in follow up for stage IA ([id number]) IDC of left breast, G3, ER/AZ/Her-2 negative, s/p left breast lumpectomy and SLNB on 21. (ER weakly positive on bx).  Dr. Elvis Aj - completed adjuvant chemo with TC x 4 cycles. She had been started on adjuvant endocrine therapy with Anastrozole 1 mg daily - this was changed to Exemestane as of 2022. She has also completed radiation therapy with Dr. Steff Owen (2021 - 2022).  She offers no new breast related complaints at this time.   Her most recent imaging is as follows: B/L Dx Mammo - 2023: - There are scattered areas of fibroglandular density. - Stable postlumpectomy changes are seen in the left breast. - There is no mammographic evidence of malignancy.  BI-RADS 2: BENIGN  She presents today for evaluation and imaging review.  2024 -- GRAHAM STUBBS is a 73-year-old female patient who presents today in follow up for stage IA ([id number]) IDC of left breast, G3, ER/AZ/Her-2 negative, s/p left breast lumpectomy and SLNB on 21. (ER weakly positive on bx).  Dr. Elvis Aj - completed adjuvant chemo with TC x 4 cycles. She had been started on adjuvant endocrine therapy with Anastrozole 1 mg daily - this was changed to Exemestane as of 2022. She has also completed radiation therapy with Dr. Steff Owen (2021 - 2022).  She offers no new breast related complaints at this time.   Her most recent imaging is as follows: B/L Screening Mammo - 2024: -There are scattered areas of fibroglandular density. -No suspicious masses, calcifications, or other findings are seen. -No mammographic evidence of malignancy. BI-RADS 1: NEGATIVE  She presents today for evaluation and imaging review.

## 2024-05-24 NOTE — DATA REVIEWED
[FreeTextEntry1] : B/L Screening Mammo - 05/14/2024: Tomosynthesis 3D and 2D imaging of the breast(s) were performed.  Current study was also evaluated with a computer aided detection (CAD) system.   Breast density: There are scattered areas of fibroglandular density.   No suspicious masses, calcifications, or other findings are seen. IMPRESSION:    No mammographic evidence of malignancy.   A 1 year screening mammogram of both breast(s) is recommended.   The patient will be sent a normal letter.   BI-RADS 1: NEGATIVE

## 2024-05-24 NOTE — REASON FOR VISIT
[Follow-Up: _____] : a [unfilled] follow-up visit [FreeTextEntry1] : stage IA ([id number]) IDC of left breast, G3, ER/DE/Her-2 negative; imaging review.

## 2024-05-24 NOTE — ASSESSMENT
[FreeTextEntry1] : Eulalia is a 73 F with a left breast IDC, ER weakly positive, [place] neg, Her 2 neg, [id number], stage 1 breast cancer, s/p L WLE and SLN Bx on 6/29/2021.   6/29/2021 -- L WLE and SLN Bx  1. L breast mass @3N10  -IDC, poorly differentiated, medullary features  -no intraductal component -no LVI  -ER weakly positive, [place] neg, Her 2 neg -T = 1.1 cm  -closest margins was 0.5mm from superior margin and 1 mm from anterior margin -additional margins were all negative for carcinoma (superior, inferior, anterior, medial, lateral, posterior)   2. L axillary sentinel lymph node biopsy  -0/6 LN positive for mets  -[id number]  On physical exam, no suspicious abnormalities palpated within either breast, no lymphadenopathy. left breast - both surgical incisions are healing well; palpable area of scar tissue noted - approx 2x2cm.   Dr. Elvis Aj - completed adjuvant chemo with TC x 4 cycles. She had been started on adjuvant endocrine therapy with Anastrozole 1 mg daily - this was changed to Exemestane as of June 2022. She has also completed radiation therapy with Dr. Steff Owen (12/27/2021 - 01/25/2022).  Her most recent imaging is as follows: B/L Screening Mammo - 05/14/2024: -There are scattered areas of fibroglandular density. -No suspicious masses, calcifications, or other findings are seen. -No mammographic evidence of malignancy. BI-RADS 1: NEGATIVE  She will be due for a B/L Screening Mammo in May 2025. This will be ordered for her today.  She can follow up after for a CBE.  ---  Her final pathology revealed a 1.1 cm IDC, poorly differentiated, with medullary features, ER weakly positive, [place] neg, Her 2 neg, no LVI, no intraductal component, negative surgical margins and 6 benign left axillary sentinel lymph nodes for a [id number] breast cancer.    --- AS REVIEW:  Her most recent imaging was a b/l screening mammogram and subsequent L dx mammogram and US On 4/29/2021 and 5/14/2021 which revealed @3N10, in her left breast, her biopsy proven cancer, measuring 8 x 7 x 13 mm.   We have discussed her new diagnosis of breast cancer.  She is currently a stage 1 breast cancer, based off AJCC 8th edition.  We discussed her surgical and other treatment options at this time.   In regards to her left sided breast cancer, she is a great candidate for either breast conservation surgery or a mastectomy.  Her lesion is located @3N10   based off the US so she is a candidate for a nipple sparing mastectomy.  However, there is no difference in survival between a lumpectomy + radiation therapy and a mastectomy.  However the rate of local recurrence is slightly higher with the lumpectomy. The rate of recurrence with a mastectomy is not zero, however, and is likely closer to 4-9%.    Regardless of if she chooses a mastectomy or a lumpectomy, she would require an evaluation of her axillary lymph nodes via a sentinel lymph node biopsy.  This is done by injecting the perioareolar breast tissue with dye prior to the surgery and removing 1-5 lymph nodes that are the first to drain the breast.    At this time, she is leaning towards undergoing breast conservation therapy with a LUMPECTOMY.  Her left breast lesion is not palpable on clinical exam, so she will need a radiofrequency guided lumpectomy on the left breast.  In the interim, we will obtain a bilateral breast MRI to further evaluate the extent of her disease.   We did briefly discuss the different radiation options.  If she got a mastectomy, she would likely only need radiation therapy if she had a lot of positive margins. If she undergoes a lumpectomy, she is a candidate for both partial breast irradiation and whole breast irradiation.  We briefly discussed the differences between these two modalities.    In regards to systemic therapy, we briefly discussed both chemotherapy and endocrine therapy. If the tumor is larger than 1 cm and her 2 negative, we would likely need to send an additional study on her tumor sample, called an oncotype DX to make the determination regarding if chemotherapy would help her.  At the completion of all these treatments, she should get endocrine therapy, at current time she would need an AI, for a total of at least 5 years, although her ER is only weakly positive so she may not derive as much benefit from the endocrine therapy.    Per ASBrS consensus guidelines, any patient with a diagnosis of breast cancer should be offered panel genetic testing as 10% of these patients were found to have a mutation. In addition, her mother also had breast cancer, diagnosed at age 52. THis was offered to her and she would like to proceed with panel genetic testing.  Her blood was drawn today.  --- I spent a total of 30 minutes of face to face time with this patient, greater than 50% of which was spent in counseling and/or coordination of care. All of her questions were appropriately answered. She knows to call with any concerns.   PLAN -Continue f/u with Med Onc as scheduled. -Continue f/u with Rad Onc as scheduled. -B/L Screening Mammo - May 2025. -f/up after

## 2024-05-24 NOTE — PHYSICAL EXAM
[Normocephalic] : normocephalic [Atraumatic] : atraumatic [No Supraclavicular Adenopathy] : no supraclavicular adenopathy [No dominant masses] : no dominant masses in right breast  [No dominant masses] : no dominant masses left breast [No Nipple Discharge] : no left nipple discharge [Breast Nipple Inversion] : nipples not inverted [Breast Nipple Retraction] : nipples not retracted [No Rashes] : no rashes [No Ulceration] : no ulceration [de-identified] : no suspicious abnormalities palpated within either breast, no lymphadenopathy  [de-identified] : both surgical incisions are well healed; palpable area of scar tissue noted - approx 2x2cm.  [de-identified] : No axillary lymphadenopathy appreciated. [de-identified] : No axillary lymphadenopathy appreciated. [de-identified] : SOZO: 23.5 --> 12.9 --> 14.5 [de-identified] : some hyperpigmentation of the left breast secondary to RT noted.

## 2024-05-24 NOTE — PAST MEDICAL HISTORY
[Menarche Age ____] : age at menarche was [unfilled] [Menopause Age____] : age at menopause was [unfilled] [History of Hormone Replacement Treatment] : has no history of hormone replacement treatment [Total Preg ___] : G[unfilled] [Live Births ___] : P[unfilled]  [Age At Live Birth ___] : Age at live birth: [unfilled] [FreeTextEntry6] : denies [FreeTextEntry5] : denies  [FreeTextEntry7] : yes in past x 15 years, stopped >30 years prior  [FreeTextEntry8] : denies

## 2024-05-29 ENCOUNTER — APPOINTMENT (OUTPATIENT)
Dept: OBGYN | Facility: CLINIC | Age: 74
End: 2024-05-29
Payer: MEDICARE

## 2024-05-29 VITALS
HEART RATE: 81 BPM | HEIGHT: 59 IN | BODY MASS INDEX: 44.35 KG/M2 | WEIGHT: 220 LBS | DIASTOLIC BLOOD PRESSURE: 82 MMHG | SYSTOLIC BLOOD PRESSURE: 140 MMHG

## 2024-05-29 PROCEDURE — G0101: CPT

## 2024-05-29 NOTE — PHYSICAL EXAM
[Appropriately responsive] : appropriately responsive [Alert] : alert [No Acute Distress] : no acute distress [No Lymphadenopathy] : no lymphadenopathy [Soft] : soft [Non-tender] : non-tender [Non-distended] : non-distended [No HSM] : No HSM [No Lesions] : no lesions [No Mass] : no mass [Oriented x3] : oriented x3 [Labia Majora] : normal [Labia Minora] : normal [Normal] : normal [Uterine Adnexae] : normal [FreeTextEntry7] : obese [FreeTextEntry6] : Difficult to assess adnexa due to body habitus however no tenderness or gross masses felt

## 2024-05-29 NOTE — DISCUSSION/SUMMARY
[FreeTextEntry1] : 72 yo annual gyn, high BNI 44 doing well dexa 2023 mammogram 5/14 neg colonoscopy in [place]

## 2024-05-29 NOTE — HISTORY OF PRESENT ILLNESS
[Patient reported mammogram was normal] : Patient reported mammogram was normal [Patient reported bone density results were normal] : Patient reported bone density results were normal [Patient reported colonoscopy was normal] : Patient reported colonoscopy was normal [Y] : Positive pregnancy history [TextBox_4] : GYNHX No history of fibroids, cysts, or STDs [Mammogramdate] : 5-2023 [BoneDensityDate] :  [ColonoscopyDate] : 1-2024 [FreeTextEntry1] : 2

## 2024-09-19 ENCOUNTER — APPOINTMENT (OUTPATIENT)
Age: 74
End: 2024-09-19
Payer: MEDICARE

## 2024-09-19 ENCOUNTER — OUTPATIENT (OUTPATIENT)
Dept: OUTPATIENT SERVICES | Facility: HOSPITAL | Age: 74
LOS: 1 days | End: 2024-09-19
Payer: MEDICARE

## 2024-09-19 VITALS
OXYGEN SATURATION: 99 % | DIASTOLIC BLOOD PRESSURE: 89 MMHG | SYSTOLIC BLOOD PRESSURE: 159 MMHG | HEIGHT: 57.5 IN | HEART RATE: 83 BPM | WEIGHT: 214.56 LBS | TEMPERATURE: 98.2 F | BODY MASS INDEX: 45.66 KG/M2

## 2024-09-19 DIAGNOSIS — Z79.811 ENCOUNTER FOR THERAPEUTIC DRUG LVL MONITORING: ICD-10-CM

## 2024-09-19 DIAGNOSIS — G62.9 POLYNEUROPATHY, UNSPECIFIED: ICD-10-CM

## 2024-09-19 DIAGNOSIS — C50.912 MALIGNANT NEOPLASM OF UNSPECIFIED SITE OF LEFT FEMALE BREAST: ICD-10-CM

## 2024-09-19 DIAGNOSIS — Z98.890 OTHER SPECIFIED POSTPROCEDURAL STATES: Chronic | ICD-10-CM

## 2024-09-19 DIAGNOSIS — Z96.652 PRESENCE OF LEFT ARTIFICIAL KNEE JOINT: Chronic | ICD-10-CM

## 2024-09-19 DIAGNOSIS — Z17.0 MALIGNANT NEOPLASM OF LOWER-OUTER QUADRANT OF LEFT FEMALE BREAST: ICD-10-CM

## 2024-09-19 DIAGNOSIS — Z51.81 ENCOUNTER FOR THERAPEUTIC DRUG LVL MONITORING: ICD-10-CM

## 2024-09-19 DIAGNOSIS — C50.512 MALIGNANT NEOPLASM OF LOWER-OUTER QUADRANT OF LEFT FEMALE BREAST: ICD-10-CM

## 2024-09-19 PROCEDURE — 99213 OFFICE O/P EST LOW 20 MIN: CPT

## 2024-09-19 NOTE — HISTORY OF PRESENT ILLNESS
[de-identified] : 69 yo female was referred by Dr. Bell for consultation of breast cancer. Eulalia had b/l screening mammo on 21 which showed asymmetry in the lateral left breast. On 21, left breast dx mammo and US showed hypoechoic mass, measures 8 x 7 x 13 mm  @3N10. 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 \par  \par  On 21, she had b/l breast MRI which showed Abnormal enhancement in the region of the biopsy corresponding to the site of index cancer. No other areas of abnormal enhancement in either breast\par  \par  On 21, she underwent left 3 N10 mass, radio frequency seed localized lumpectomy. 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 No lymphovascular invasion nor intraductal component is present. The surgical margins are negative. 6 sentinel lymph nodes are negative for malignancy. AJCC 8th Edition Pathologic Stage: pT1c, p(sn)N0, pMx. Biomarker study were done on surgical specimen. ER, FL and Her-2 (FISH ratio 1.3) are all negative. Ki 67 is 90%. \par  \par  Eulalia recovered well from surgery. She is here today to discuss adjuvant systemic therapy. \par  \par  She has a family history of breast cancer in her mother mother diagnosed with breast cancer at 40's. Her brother had esophageal cancer passed away at 45. She had germ line genetic test. The result is pending. \par  \par  She is  and was menopause at 49. \par  left knee replacement. chronic pain joints. [de-identified] : 9/9/21 Eulalia is here today for follow up visit and to start chemotherapy. She has stage IA ([id number]) IDC of left breast, G3, ER/ME/Her-2 negative, s/p left breast lumpectomy and SLNB on 6/29/21 with negative margins. During her previous visit, we discussed adjuvant chemotherapy options. I also talked to her daughter who decided to let her mother take TC regimen x 4 cycles.  She had an unofficial second opinion from her daughter's contact at a [facility]. She had her port placed on 9/3/21 without any complications.  Today, she offers no new breast-related complaints.  9/30/21 Eulalia is here today for follow up visit and chemotherapy. She has stage IA ([id number]) IDC of left breast, G3, ER/ME/Her-2 negative, s/p left breast lumpectomy and SLNB on 6/29/21 with negative margins. She started adjuvant chemo with TC and had the 1st cycle three weeks. She had nausea for several days following chemo.  She used the compazine and zofran which caused some headaches. She did not have stomatitis, diarrhea or fever.   10/20/21 Eulalia is here today for follow up visit and chemotherapy. She has stage IA ([id number]) IDC of left breast, G3, ER/ME/Her-2 negative, s/p left breast lumpectomy and SLNB on 6/29/21 with negative margins. She started adjuvant chemo with TC and to date she received 2 cycles. She states 2 days after the treatment she developed pruritic and a pruritic rash on her head subsided with Benadryl and PO and Benadryl cream. She had nausea and fatigue for several days following chemo. She used the zofran which relieves nausea. She also reports numbness and tingling to left fingers started after 2nd treatment. She did not have stomatitis, diarrhea or fever.   11/11/21 Eulalia is here today for follow up visit and chemotherapy. She has stage IA ([id number]) IDC of left breast, G3, ER/ME/Her-2 negative, s/p left breast lumpectomy and SLNB on 6/29/21 with negative margins. She started adjuvant chemo with TC and to date she received 3 cycles. She states 2 days after the treatment she develops a pruritic erythematous rash on her abdomen and under bilateral breast. The rash subsides with Benadryl and PO and Benadryl cream. She had nausea and fatigue for several days following chemo. She used the zofran which relieves nausea. She also reports numbness and tingling to left fingers. When she went for chemo 10/20 the nurses had difficulty accessing port. She was sent to IR for port evaluation and revision. Port was resutured and working. On 11/2 she was seen by IR for follow up however port was noted to have drainage and pus, patient also had fever. Port was subsequently removed and she was started on Cephalexin 500 mg Q12 x 7 days (last dose tomorrow). A Midline line was inserted to right upper arm. She saw PMD last week for a non productive, chest xray was done and she was dx with bronchitis, she completed Zpak and continues prednisone. Also noted to have fluid in lungs, therefore PMD increased her water pill. She denies cough, fever, chills, SOB.   12/2/21 Eulalia is here today for follow up visit and chemotherapy. She has stage IA ([id number]) IDC of left breast, G3, ER/ME/Her-2 negative, s/p left breast lumpectomy and SLNB on 6/29/21 with negative margins. She started adjuvant chemo with TC and to date she received 4 cycles. Rash and itch has subsided since chemotherapy was completed. She still reports numbness and tingling to left fingers. When she went for chemo 10/20 the nurses had difficulty accessing port. She was sent to IR for port evaluation and revision. Port was resutured and working. On 11/2 she was seen by IR for follow up however port was noted to have drainage and pus, patient also had fever. Port was subsequently removed and a Midline line was inserted to right upper arm, which was removed after her last treatment. She denies cough, fever, chills, SOB. She saw Dr Owen today, plan to start RT in 1-2 weeks.   2/10/22 Eulalia is here today for follow up visit and chemotherapy. She has stage IA ([id number]) IDC of left breast, G3, ER/ME/Her-2 negative, s/p left breast lumpectomy and SLNB on 6/29/21 with negative margins. She completed adjuvant chemo with TC 11/2021 and radiation #20 on 1/31/20222 with Dr. Owen.  She stated on Anastrozole in 12/2021 and tolerating it well. She saw Dr. Bell recently and c/o neuropathy to her left hand/arm.  She was instructed to get medication from Dr. Aj.    5/25/22- Eulalia is here today for follow up visit today.  She has stage IA ([id number]) IDC of left breast, G3, ER/ME/Her-2 negative, s/p left breast lumpectomy and SLNB on 6/29/21 with negative margins. She completed adjuvant chemo with TC 11/2021 and radiation #20 on 1/31/20222 with Dr. Owen.  She stated on Anastrozole in 12/2021 and has c/.o diffuse muscle/ joint aches that is impacting her quality of life.  Her last mammogram and US breast b/l April 2022 was BIRADS 2.   09/1/22 Eulalia is here today for follow up visit today. She has stage IA ([id number]) IDC of left breast, G3, ER/ME/Her-2 negative, s/p left breast lumpectomy and SLNB on 6/29/21 with negative margins. She completed adjuvant chemo with TC 11/2021 and radiation on 1/31/20222 with Dr. Owen.  She stated on Anastrozole in 12/2021 and has c/.o diffuse muscle/ joint aches that is impacting her quality of life, anastrozole was held for two week, arthralgia persisted. She started exemestane in 6/2022, tolerating well. Her last mammogram and US breast b/l April 2022 was BIRADS 2. She still reports peripheral neuropathy left hand, was taking gabapentin with relief, ran out of medication.   3/8/23: Eulalia is here today for follow up visit today. She has stage IA ([id number]) IDC of left breast, G3, ER/ME/Her-2 negative, s/p left breast lumpectomy and SLNB on 6/29/21 with negative margins. She completed adjuvant chemo with TC 11/2021 and radiation on 1/31/20222 with Dr. Owen.  She stated on Anastrozole in 12/2021 and switched to exemestane in 6/2022. She still feels arthralgia but is able to stay on pill. Left breast dx mammo and US in 10/2022 did not show suspicious finding.  She complains left arm and hand tightness likely due to axillary surgery.   9/20/23 Eulalia is here for follow up visit. She has stage IA ([id number]) IDC of left breast, G3, ER/ME/Her-2 negative, s/p left breast lumpectomy and SLNB on 6/29/21 with negative margins. She completed adjuvant chemo with TC 11/2021 and radiation on 1/31/20222 with Dr. Owen.  She stated Anastrozole in 12/2021 and switched to Exemestane in 6/2022. She takes Calcium but not Vitamin D. She reports intermittent numbness of both hands since she stopped taking Gabapentin in 4/2023. Left breast dx mammo and US in 10/2022 did not show suspicious finding. Deepak dx mammo/US on 5/9/23 was benign. She denies any breast issues at this time.  3/20/24: Eulalia is here for follow up visit. She has stage IA ([id number]) IDC of left breast, G3, ER positive on biopsy but negative on surgical specimen, ME/Her-2 negative, s/p left breast lumpectomy and SLNB on 6/29/21 with negative margins. She completed adjuvant chemo with TC 11/2021 and radiation on 1/31/20222.  She stated Anastrozole in 12/2021 and switched to Exemestane in 6/2022. She takes Calcium but not Vitamin D. Deepak dx mammo/US on 5/9/23 did not show suspicious finding. She complains increasing numbness of both hands since she stopped taking Gabapentin.   9/19/24 Eulalia is here for follow up visit. She has stage IA ([id number]) IDC of left breast, G3, ER positive on biopsy but negative on surgical specimen, ME/Her-2 negative, s/p left breast lumpectomy and SLNB on 6/29/21 with negative margins. She completed adjuvant chemo with TC 11/2021 and radiation on 1/31/20222.  She stated Anastrozole in 12/2021 and switched to Exemestane in 6/2022. She takes Calcium but not Vitamin D. Bilateral screening mammo on 5/14/24 showed no suspicious findings. She had stopped taking Gabapentin, still has numbness or hands and feet and would like to re-start Gabapentin again. She denies any breast symptoms. She is UTD with PCP, gyne and GI. She had colonoscopy in 6/2024 with Dr Akins.

## 2024-09-19 NOTE — PHYSICAL EXAM
[Fully active, able to carry on all pre-disease performance without restriction] : Status 0 - Fully active, able to carry on all pre-disease performance without restriction [Obese] : obese [Normal] : affect appropriate [de-identified] : Uses a cane. [de-identified] : Status post left breast lumpectomy and SLNB. Surgical incisions healed well. There is no palpable abnormality.

## 2024-09-19 NOTE — ASSESSMENT
[FreeTextEntry1] : 72 yo female has stage IA ([id number]) IDC of left breast, G3, ER positive on biopsy but negative on surgical specimen, MN/Her-2 negative, s/p left breast lumpectomy and SLNB with negative margins.  Germline genetic panel study ([facility]) is negative for mutations.  Assessment and Plan: -- S/P adjuvant chemo with TC x 4 cycles in 11/2021. -- S/P adjuvant breast radiotherapy 5240cGy between 12/27/21 to 1/25/22.  -- Breast exam today shows no palpable abnormality. B/l mammo on 5/14/2024 was benign. She will have annual screening mammo on 5/2025, she has a script. -- Continue Exemestane daily, eRx sent. -- Dexa scan in Dec 2023 was normal. She will c/w Vit D. Encourage healthy lifestyle and regular physical activities.  -- Peripheral neuropathy, Will restart gabapentin 300 mg qhs, Rx sent. -- Follow up with breast surgeon and Dr. Owen as directed.   -- Follow up with PCP, gyne and GI for health maintenance and screenings. -- RTO for follow up in 6 months.

## 2024-09-19 NOTE — REVIEW OF SYSTEMS
[Joint Pain] : joint pain [Joint Stiffness] : joint stiffness [Muscle Pain] : muscle pain [Negative] : Allergic/Immunologic [FreeTextEntry2] : Uses a cane. [de-identified] : Numbness of both hands and feet.

## 2024-09-20 DIAGNOSIS — C50.912 MALIGNANT NEOPLASM OF UNSPECIFIED SITE OF LEFT FEMALE BREAST: ICD-10-CM

## 2025-04-24 NOTE — ED PROVIDER NOTE - INTERNATIONAL TRAVEL
No Repeat assessment done for time period of hospitalization. Denies suicidal/homicidal ideations. Patient able to contract to safety. Compliant with medication. Does not warrant continued inpatient hospitalization. Patient does not appear to be a risk to self or to others at this time.

## 2025-05-13 ENCOUNTER — OUTPATIENT (OUTPATIENT)
Dept: OUTPATIENT SERVICES | Facility: HOSPITAL | Age: 75
LOS: 1 days | End: 2025-05-13
Payer: MEDICARE

## 2025-05-13 ENCOUNTER — APPOINTMENT (OUTPATIENT)
Dept: RADIATION ONCOLOGY | Facility: HOSPITAL | Age: 75
End: 2025-05-13
Payer: MEDICARE

## 2025-05-13 VITALS
OXYGEN SATURATION: 98 % | WEIGHT: 207.38 LBS | TEMPERATURE: 98.1 F | BODY MASS INDEX: 44.1 KG/M2 | DIASTOLIC BLOOD PRESSURE: 73 MMHG | RESPIRATION RATE: 16 BRPM | SYSTOLIC BLOOD PRESSURE: 154 MMHG | HEART RATE: 63 BPM

## 2025-05-13 DIAGNOSIS — C50.412 MALIGNANT NEOPLASM OF UPPER-OUTER QUADRANT OF LEFT FEMALE BREAST: ICD-10-CM

## 2025-05-13 DIAGNOSIS — Z17.0 MALIGNANT NEOPLASM OF UPPER-OUTER QUADRANT OF LEFT FEMALE BREAST: ICD-10-CM

## 2025-05-13 DIAGNOSIS — Z98.890 OTHER SPECIFIED POSTPROCEDURAL STATES: Chronic | ICD-10-CM

## 2025-05-13 DIAGNOSIS — Z96.652 PRESENCE OF LEFT ARTIFICIAL KNEE JOINT: Chronic | ICD-10-CM

## 2025-05-13 DIAGNOSIS — Z92.3 PERSONAL HISTORY OF IRRADIATION: ICD-10-CM

## 2025-05-13 PROCEDURE — G2211 COMPLEX E/M VISIT ADD ON: CPT

## 2025-05-13 PROCEDURE — 99213 OFFICE O/P EST LOW 20 MIN: CPT

## 2025-05-13 RX ORDER — NIFEDIPINE 10 MG/1
10 CAPSULE ORAL
Refills: 0 | Status: ACTIVE | COMMUNITY

## 2025-05-15 DIAGNOSIS — C50.412 MALIGNANT NEOPLASM OF UPPER-OUTER QUADRANT OF LEFT FEMALE BREAST: ICD-10-CM

## 2025-05-27 ENCOUNTER — NON-APPOINTMENT (OUTPATIENT)
Age: 75
End: 2025-05-27

## 2025-05-27 ENCOUNTER — APPOINTMENT (OUTPATIENT)
Dept: BREAST CENTER | Facility: CLINIC | Age: 75
End: 2025-05-27
Payer: MEDICARE

## 2025-05-27 VITALS
HEIGHT: 57 IN | WEIGHT: 207 LBS | BODY MASS INDEX: 44.66 KG/M2 | DIASTOLIC BLOOD PRESSURE: 80 MMHG | SYSTOLIC BLOOD PRESSURE: 148 MMHG

## 2025-05-27 DIAGNOSIS — C50.912 MALIGNANT NEOPLASM OF UNSPECIFIED SITE OF LEFT FEMALE BREAST: ICD-10-CM

## 2025-05-27 PROCEDURE — 99214 OFFICE O/P EST MOD 30 MIN: CPT

## 2025-06-04 ENCOUNTER — APPOINTMENT (OUTPATIENT)
Age: 75
End: 2025-06-04
Payer: MEDICARE

## 2025-06-04 ENCOUNTER — OUTPATIENT (OUTPATIENT)
Dept: OUTPATIENT SERVICES | Facility: HOSPITAL | Age: 75
LOS: 1 days | End: 2025-06-04
Payer: MEDICARE

## 2025-06-04 VITALS
HEART RATE: 75 BPM | HEIGHT: 57 IN | TEMPERATURE: 98.7 F | DIASTOLIC BLOOD PRESSURE: 84 MMHG | RESPIRATION RATE: 16 BRPM | SYSTOLIC BLOOD PRESSURE: 142 MMHG | BODY MASS INDEX: 45.09 KG/M2 | OXYGEN SATURATION: 100 % | WEIGHT: 209 LBS

## 2025-06-04 DIAGNOSIS — Z51.81 ENCOUNTER FOR THERAPEUTIC DRUG LVL MONITORING: ICD-10-CM

## 2025-06-04 DIAGNOSIS — C50.912 MALIGNANT NEOPLASM OF UNSPECIFIED SITE OF LEFT FEMALE BREAST: ICD-10-CM

## 2025-06-04 DIAGNOSIS — Z79.811 ENCOUNTER FOR THERAPEUTIC DRUG LVL MONITORING: ICD-10-CM

## 2025-06-04 DIAGNOSIS — C50.512 MALIGNANT NEOPLASM OF LOWER-OUTER QUADRANT OF LEFT FEMALE BREAST: ICD-10-CM

## 2025-06-04 DIAGNOSIS — Z98.890 OTHER SPECIFIED POSTPROCEDURAL STATES: Chronic | ICD-10-CM

## 2025-06-04 DIAGNOSIS — Z96.652 PRESENCE OF LEFT ARTIFICIAL KNEE JOINT: Chronic | ICD-10-CM

## 2025-06-04 DIAGNOSIS — Z17.0 MALIGNANT NEOPLASM OF LOWER-OUTER QUADRANT OF LEFT FEMALE BREAST: ICD-10-CM

## 2025-06-04 PROCEDURE — 99214 OFFICE O/P EST MOD 30 MIN: CPT

## 2025-06-05 DIAGNOSIS — C50.912 MALIGNANT NEOPLASM OF UNSPECIFIED SITE OF LEFT FEMALE BREAST: ICD-10-CM
